# Patient Record
Sex: FEMALE | Race: WHITE | Employment: OTHER | URBAN - NONMETROPOLITAN AREA
[De-identification: names, ages, dates, MRNs, and addresses within clinical notes are randomized per-mention and may not be internally consistent; named-entity substitution may affect disease eponyms.]

---

## 2017-03-17 ENCOUNTER — HOSPITAL ENCOUNTER (OUTPATIENT)
Dept: LAB | Age: 82
Discharge: HOME | End: 2017-03-17
Attending: INTERNAL MEDICINE | Admitting: INTERNAL MEDICINE

## 2017-03-17 LAB
ALBUMIN SERPL-MCNC: 3.9 GM/DL (ref 3.4–5)
ALP BLD-CCNC: 66 IU/L (ref 40–129)
ALT SERPL-CCNC: 5 U/L (ref 10–40)
ANION GAP SERPL CALCULATED.3IONS-SCNC: 3 MMOL/L (ref 4–16)
AST SERPL-CCNC: 13 IU/L (ref 15–37)
BILIRUB SERPL-MCNC: 0.4 MG/DL (ref 0–1)
BUN BLDV-MCNC: 19 MG/DL (ref 6–23)
CALCIUM SERPL-MCNC: 9.4 MG/DL (ref 8.3–10.6)
CHLORIDE BLD-SCNC: 101 MMOL/L (ref 99–110)
CHOLESTEROL: 161 MG/DL
CO2: 33 MMOL/L (ref 21–32)
CREAT SERPL-MCNC: 0.7 MG/DL (ref 0.6–1.1)
CREATININE URINE: 63.4 MG/DL (ref 28–217)
ESTIMATED AVERAGE GLUCOSE: 154 MG/DL
GFR AFRICAN AMERICAN: >60 ML/MIN/1.73M2
GFR NON-AFRICAN AMERICAN: >60 ML/MIN/1.73M2
GLUCOSE BLD-MCNC: 148 MG/DL (ref 70–140)
HBA1C MFR BLD: 7 % (ref 4.2–6.3)
HDLC SERPL-MCNC: 55 MG/DL
LDL CHOLESTEROL DIRECT: 107 MG/DL
MICROALBUMIN/CREAT 24H UR: 2 MG/DL
MICROALBUMIN/CREAT UR-RTO: 31.5 MG/G CREAT (ref 0–30)
POTASSIUM SERPL-SCNC: 3.9 MMOL/L (ref 3.5–5.1)
SODIUM BLD-SCNC: 137 MMOL/L (ref 135–145)
TOTAL PROTEIN: 7.2 GM/DL (ref 6.4–8.2)
TRIGL SERPL-MCNC: 71 MG/DL

## 2017-03-22 ENCOUNTER — OFFICE VISIT (OUTPATIENT)
Dept: INTERNAL MEDICINE CLINIC | Age: 82
End: 2017-03-22

## 2017-03-22 VITALS
WEIGHT: 123 LBS | HEIGHT: 64 IN | TEMPERATURE: 97.7 F | SYSTOLIC BLOOD PRESSURE: 120 MMHG | BODY MASS INDEX: 21 KG/M2 | DIASTOLIC BLOOD PRESSURE: 72 MMHG | HEART RATE: 88 BPM | OXYGEN SATURATION: 96 % | RESPIRATION RATE: 16 BRPM

## 2017-03-22 DIAGNOSIS — I77.1 STENOSIS OF LEFT SUBCLAVIAN ARTERY (HCC): ICD-10-CM

## 2017-03-22 DIAGNOSIS — E78.2 MIXED HYPERLIPIDEMIA: Primary | ICD-10-CM

## 2017-03-22 DIAGNOSIS — I25.10 CORONARY ARTERY DISEASE INVOLVING NATIVE CORONARY ARTERY OF NATIVE HEART WITHOUT ANGINA PECTORIS: ICD-10-CM

## 2017-03-22 DIAGNOSIS — E11.9 DIABETES MELLITUS TYPE 2 IN NONOBESE (HCC): ICD-10-CM

## 2017-03-22 DIAGNOSIS — H40.9 GLAUCOMA OF BOTH EYES, UNSPECIFIED GLAUCOMA: ICD-10-CM

## 2017-03-22 DIAGNOSIS — F41.8 SITUATIONAL ANXIETY: ICD-10-CM

## 2017-03-22 DIAGNOSIS — C50.912 MALIGNANT NEOPLASM OF LEFT FEMALE BREAST, UNSPECIFIED SITE OF BREAST: ICD-10-CM

## 2017-03-22 DIAGNOSIS — N63.20 LEFT BREAST MASS: ICD-10-CM

## 2017-03-22 DIAGNOSIS — G20 PARKINSON DISEASE (HCC): ICD-10-CM

## 2017-03-22 PROCEDURE — 1090F PRES/ABSN URINE INCON ASSESS: CPT | Performed by: INTERNAL MEDICINE

## 2017-03-22 PROCEDURE — G8427 DOCREV CUR MEDS BY ELIG CLIN: HCPCS | Performed by: INTERNAL MEDICINE

## 2017-03-22 PROCEDURE — G8419 CALC BMI OUT NRM PARAM NOF/U: HCPCS | Performed by: INTERNAL MEDICINE

## 2017-03-22 PROCEDURE — 1036F TOBACCO NON-USER: CPT | Performed by: INTERNAL MEDICINE

## 2017-03-22 PROCEDURE — G8598 ASA/ANTIPLAT THER USED: HCPCS | Performed by: INTERNAL MEDICINE

## 2017-03-22 PROCEDURE — 4040F PNEUMOC VAC/ADMIN/RCVD: CPT | Performed by: INTERNAL MEDICINE

## 2017-03-22 PROCEDURE — G8484 FLU IMMUNIZE NO ADMIN: HCPCS | Performed by: INTERNAL MEDICINE

## 2017-03-22 PROCEDURE — G8399 PT W/DXA RESULTS DOCUMENT: HCPCS | Performed by: INTERNAL MEDICINE

## 2017-03-22 PROCEDURE — 1123F ACP DISCUSS/DSCN MKR DOCD: CPT | Performed by: INTERNAL MEDICINE

## 2017-03-22 PROCEDURE — 99214 OFFICE O/P EST MOD 30 MIN: CPT | Performed by: INTERNAL MEDICINE

## 2017-03-22 RX ORDER — SIMVASTATIN 10 MG
10 TABLET ORAL NIGHTLY
Qty: 30 TABLET | Refills: 5 | Status: SHIPPED | OUTPATIENT
Start: 2017-03-22 | End: 2018-07-16 | Stop reason: SDUPTHER

## 2017-03-22 RX ORDER — CLONAZEPAM 0.5 MG/1
0.5 TABLET ORAL DAILY
Refills: 3 | COMMUNITY
Start: 2017-01-03 | End: 2017-07-06 | Stop reason: ALTCHOICE

## 2017-03-22 ASSESSMENT — ENCOUNTER SYMPTOMS
COUGH: 0
SHORTNESS OF BREATH: 0
RESPIRATORY NEGATIVE: 1
GASTROINTESTINAL NEGATIVE: 1
EYES NEGATIVE: 1

## 2017-03-28 ENCOUNTER — OFFICE VISIT (OUTPATIENT)
Dept: SURGERY | Age: 82
End: 2017-03-28

## 2017-03-28 VITALS
WEIGHT: 123.2 LBS | HEART RATE: 86 BPM | BODY MASS INDEX: 21.48 KG/M2 | RESPIRATION RATE: 14 BRPM | DIASTOLIC BLOOD PRESSURE: 86 MMHG | SYSTOLIC BLOOD PRESSURE: 120 MMHG

## 2017-03-28 DIAGNOSIS — N63.20 LEFT BREAST MASS: Primary | ICD-10-CM

## 2017-03-28 PROCEDURE — G8598 ASA/ANTIPLAT THER USED: HCPCS | Performed by: SURGERY

## 2017-03-28 PROCEDURE — G8427 DOCREV CUR MEDS BY ELIG CLIN: HCPCS | Performed by: SURGERY

## 2017-03-28 PROCEDURE — G8419 CALC BMI OUT NRM PARAM NOF/U: HCPCS | Performed by: SURGERY

## 2017-03-28 PROCEDURE — G8484 FLU IMMUNIZE NO ADMIN: HCPCS | Performed by: SURGERY

## 2017-03-28 PROCEDURE — 4040F PNEUMOC VAC/ADMIN/RCVD: CPT | Performed by: SURGERY

## 2017-03-28 PROCEDURE — G8399 PT W/DXA RESULTS DOCUMENT: HCPCS | Performed by: SURGERY

## 2017-03-28 PROCEDURE — 1123F ACP DISCUSS/DSCN MKR DOCD: CPT | Performed by: SURGERY

## 2017-03-28 PROCEDURE — 99213 OFFICE O/P EST LOW 20 MIN: CPT | Performed by: SURGERY

## 2017-03-28 PROCEDURE — 1036F TOBACCO NON-USER: CPT | Performed by: SURGERY

## 2017-03-28 PROCEDURE — 1090F PRES/ABSN URINE INCON ASSESS: CPT | Performed by: SURGERY

## 2017-07-06 ENCOUNTER — OFFICE VISIT (OUTPATIENT)
Dept: INTERNAL MEDICINE CLINIC | Age: 82
End: 2017-07-06

## 2017-07-06 VITALS
RESPIRATION RATE: 16 BRPM | HEART RATE: 88 BPM | DIASTOLIC BLOOD PRESSURE: 68 MMHG | BODY MASS INDEX: 21.52 KG/M2 | WEIGHT: 123.4 LBS | SYSTOLIC BLOOD PRESSURE: 118 MMHG | OXYGEN SATURATION: 96 % | TEMPERATURE: 98.4 F

## 2017-07-06 DIAGNOSIS — H40.9 GLAUCOMA OF BOTH EYES, UNSPECIFIED GLAUCOMA: ICD-10-CM

## 2017-07-06 DIAGNOSIS — I25.10 CORONARY ARTERY DISEASE INVOLVING NATIVE CORONARY ARTERY OF NATIVE HEART WITHOUT ANGINA PECTORIS: ICD-10-CM

## 2017-07-06 DIAGNOSIS — E78.2 MIXED HYPERLIPIDEMIA: ICD-10-CM

## 2017-07-06 DIAGNOSIS — F41.8 SITUATIONAL ANXIETY: ICD-10-CM

## 2017-07-06 DIAGNOSIS — N63.20 LEFT BREAST MASS: ICD-10-CM

## 2017-07-06 DIAGNOSIS — C50.912 MALIGNANT NEOPLASM OF LEFT FEMALE BREAST, UNSPECIFIED SITE OF BREAST: ICD-10-CM

## 2017-07-06 DIAGNOSIS — R05.9 COUGH: ICD-10-CM

## 2017-07-06 DIAGNOSIS — E11.9 DIABETES MELLITUS TYPE 2 IN NONOBESE (HCC): Primary | ICD-10-CM

## 2017-07-06 DIAGNOSIS — G20 PARKINSON DISEASE (HCC): ICD-10-CM

## 2017-07-06 DIAGNOSIS — I77.1 STENOSIS OF LEFT SUBCLAVIAN ARTERY (HCC): ICD-10-CM

## 2017-07-06 LAB — HBA1C MFR BLD: 7.1 %

## 2017-07-06 PROCEDURE — G8598 ASA/ANTIPLAT THER USED: HCPCS | Performed by: INTERNAL MEDICINE

## 2017-07-06 PROCEDURE — 99214 OFFICE O/P EST MOD 30 MIN: CPT | Performed by: INTERNAL MEDICINE

## 2017-07-06 PROCEDURE — G8427 DOCREV CUR MEDS BY ELIG CLIN: HCPCS | Performed by: INTERNAL MEDICINE

## 2017-07-06 PROCEDURE — G8399 PT W/DXA RESULTS DOCUMENT: HCPCS | Performed by: INTERNAL MEDICINE

## 2017-07-06 PROCEDURE — 4040F PNEUMOC VAC/ADMIN/RCVD: CPT | Performed by: INTERNAL MEDICINE

## 2017-07-06 PROCEDURE — G8420 CALC BMI NORM PARAMETERS: HCPCS | Performed by: INTERNAL MEDICINE

## 2017-07-06 PROCEDURE — 1123F ACP DISCUSS/DSCN MKR DOCD: CPT | Performed by: INTERNAL MEDICINE

## 2017-07-06 PROCEDURE — 1090F PRES/ABSN URINE INCON ASSESS: CPT | Performed by: INTERNAL MEDICINE

## 2017-07-06 PROCEDURE — 83036 HEMOGLOBIN GLYCOSYLATED A1C: CPT | Performed by: INTERNAL MEDICINE

## 2017-07-06 PROCEDURE — 1036F TOBACCO NON-USER: CPT | Performed by: INTERNAL MEDICINE

## 2017-07-06 RX ORDER — FLUTICASONE PROPIONATE 50 MCG
1 SPRAY, SUSPENSION (ML) NASAL DAILY
Qty: 1 BOTTLE | Refills: 0 | Status: SHIPPED | OUTPATIENT
Start: 2017-07-06 | End: 2018-07-16 | Stop reason: SDUPTHER

## 2017-07-06 ASSESSMENT — ENCOUNTER SYMPTOMS
WHEEZING: 0
SPUTUM PRODUCTION: 0
GASTROINTESTINAL NEGATIVE: 1
COUGH: 1
SORE THROAT: 0
EYES NEGATIVE: 1
HEMOPTYSIS: 0
SHORTNESS OF BREATH: 0

## 2017-07-18 ENCOUNTER — HOSPITAL ENCOUNTER (OUTPATIENT)
Dept: GENERAL RADIOLOGY | Age: 82
Discharge: HOME | End: 2017-07-18
Attending: INTERNAL MEDICINE | Admitting: INTERNAL MEDICINE

## 2017-07-18 DIAGNOSIS — R05.9 COUGH: ICD-10-CM

## 2017-09-22 ENCOUNTER — HOSPITAL ENCOUNTER (OUTPATIENT)
Dept: LAB | Age: 82
Discharge: HOME | End: 2017-09-22
Attending: INTERNAL MEDICINE | Admitting: INTERNAL MEDICINE

## 2017-09-22 LAB
ALBUMIN SERPL-MCNC: 4.2 GM/DL (ref 3.4–5)
ALP BLD-CCNC: 62 IU/L (ref 40–129)
ALT SERPL-CCNC: 5 U/L (ref 10–40)
ANION GAP SERPL CALCULATED.3IONS-SCNC: 8 MMOL/L (ref 4–16)
AST SERPL-CCNC: 13 IU/L (ref 15–37)
BASOPHILS ABSOLUTE: 0.1 K/CU MM
BASOPHILS RELATIVE PERCENT: 1.1 % (ref 0–1)
BILIRUB SERPL-MCNC: 0.5 MG/DL (ref 0–1)
BUN BLDV-MCNC: 17 MG/DL (ref 6–23)
CALCIUM SERPL-MCNC: 9.2 MG/DL (ref 8.3–10.6)
CHLORIDE BLD-SCNC: 103 MMOL/L (ref 99–110)
CHOLESTEROL, FASTING: 165 MG/DL
CO2: 30 MMOL/L (ref 21–32)
CREAT SERPL-MCNC: 0.7 MG/DL (ref 0.6–1.1)
DIFFERENTIAL TYPE: ABNORMAL
EOSINOPHILS ABSOLUTE: 0.3 K/CU MM
EOSINOPHILS RELATIVE PERCENT: 4.2 % (ref 0–3)
GFR AFRICAN AMERICAN: >60 ML/MIN/1.73M2
GFR NON-AFRICAN AMERICAN: >60 ML/MIN/1.73M2
GLUCOSE FASTING: 162 MG/DL (ref 70–99)
HCT VFR BLD CALC: 40.8 % (ref 37–47)
HDLC SERPL-MCNC: 43 MG/DL
HEMOGLOBIN: 13.4 GM/DL (ref 12.5–16)
IMMATURE NEUTROPHIL %: 0.6 % (ref 0–0.43)
LDL CHOLESTEROL DIRECT: 100 MG/DL
LYMPHOCYTES ABSOLUTE: 1.7 K/CU MM
LYMPHOCYTES RELATIVE PERCENT: 26.1 % (ref 24–44)
MCH RBC QN AUTO: 27.5 PG (ref 27–31)
MCHC RBC AUTO-ENTMCNC: 32.8 % (ref 32–36)
MCV RBC AUTO: 83.6 FL (ref 78–100)
MONOCYTES ABSOLUTE: 0.5 K/CU MM
MONOCYTES RELATIVE PERCENT: 7.7 % (ref 0–4)
PDW BLD-RTO: 13.3 % (ref 11.7–14.9)
PLATELET # BLD: 270 K/CU MM (ref 140–440)
PMV BLD AUTO: 10.4 FL (ref 7.5–11.1)
POTASSIUM SERPL-SCNC: 4.6 MMOL/L (ref 3.5–5.1)
RBC # BLD: 4.88 M/CU MM (ref 4.2–5.4)
SEGMENTED NEUTROPHILS ABSOLUTE COUNT: 3.8 K/CU MM
SEGMENTED NEUTROPHILS RELATIVE PERCENT: 60.3 % (ref 36–66)
SODIUM BLD-SCNC: 141 MMOL/L (ref 135–145)
TOTAL IMMATURE NEUTOROPHIL: 0.04 K/CU MM
TOTAL PROTEIN: 7.2 GM/DL (ref 6.4–8.2)
TRIGLYCERIDE, FASTING: 98 MG/DL
WBC # BLD: 6.4 K/CU MM (ref 4–10.5)

## 2017-10-05 ENCOUNTER — OFFICE VISIT (OUTPATIENT)
Dept: INTERNAL MEDICINE CLINIC | Age: 82
End: 2017-10-05

## 2017-10-05 VITALS
OXYGEN SATURATION: 97 % | TEMPERATURE: 98.9 F | SYSTOLIC BLOOD PRESSURE: 102 MMHG | DIASTOLIC BLOOD PRESSURE: 68 MMHG | WEIGHT: 123.2 LBS | HEART RATE: 84 BPM | BODY MASS INDEX: 22.67 KG/M2 | RESPIRATION RATE: 16 BRPM | HEIGHT: 62 IN

## 2017-10-05 DIAGNOSIS — R05.9 COUGH: ICD-10-CM

## 2017-10-05 DIAGNOSIS — C50.912 MALIGNANT NEOPLASM OF LEFT FEMALE BREAST, UNSPECIFIED SITE OF BREAST: ICD-10-CM

## 2017-10-05 DIAGNOSIS — H40.9 GLAUCOMA OF BOTH EYES, UNSPECIFIED GLAUCOMA: ICD-10-CM

## 2017-10-05 DIAGNOSIS — I77.1 STENOSIS OF LEFT SUBCLAVIAN ARTERY (HCC): ICD-10-CM

## 2017-10-05 DIAGNOSIS — E11.9 DIABETES MELLITUS TYPE 2 IN NONOBESE (HCC): Primary | ICD-10-CM

## 2017-10-05 DIAGNOSIS — I25.10 CORONARY ARTERY DISEASE INVOLVING NATIVE CORONARY ARTERY OF NATIVE HEART WITHOUT ANGINA PECTORIS: ICD-10-CM

## 2017-10-05 DIAGNOSIS — E78.2 MIXED HYPERLIPIDEMIA: ICD-10-CM

## 2017-10-05 DIAGNOSIS — N63.20 LEFT BREAST MASS: ICD-10-CM

## 2017-10-05 DIAGNOSIS — G20 PARKINSON DISEASE (HCC): ICD-10-CM

## 2017-10-05 LAB — HBA1C MFR BLD: 6.8 %

## 2017-10-05 PROCEDURE — 1090F PRES/ABSN URINE INCON ASSESS: CPT | Performed by: INTERNAL MEDICINE

## 2017-10-05 PROCEDURE — 1123F ACP DISCUSS/DSCN MKR DOCD: CPT | Performed by: INTERNAL MEDICINE

## 2017-10-05 PROCEDURE — G8399 PT W/DXA RESULTS DOCUMENT: HCPCS | Performed by: INTERNAL MEDICINE

## 2017-10-05 PROCEDURE — 1036F TOBACCO NON-USER: CPT | Performed by: INTERNAL MEDICINE

## 2017-10-05 PROCEDURE — 99213 OFFICE O/P EST LOW 20 MIN: CPT | Performed by: INTERNAL MEDICINE

## 2017-10-05 PROCEDURE — G8598 ASA/ANTIPLAT THER USED: HCPCS | Performed by: INTERNAL MEDICINE

## 2017-10-05 PROCEDURE — 4040F PNEUMOC VAC/ADMIN/RCVD: CPT | Performed by: INTERNAL MEDICINE

## 2017-10-05 PROCEDURE — 83036 HEMOGLOBIN GLYCOSYLATED A1C: CPT | Performed by: INTERNAL MEDICINE

## 2017-10-05 PROCEDURE — G8420 CALC BMI NORM PARAMETERS: HCPCS | Performed by: INTERNAL MEDICINE

## 2017-10-05 PROCEDURE — G8510 SCR DEP NEG, NO PLAN REQD: HCPCS | Performed by: INTERNAL MEDICINE

## 2017-10-05 PROCEDURE — 3288F FALL RISK ASSESSMENT DOCD: CPT | Performed by: INTERNAL MEDICINE

## 2017-10-05 PROCEDURE — G8427 DOCREV CUR MEDS BY ELIG CLIN: HCPCS | Performed by: INTERNAL MEDICINE

## 2017-10-05 PROCEDURE — G8484 FLU IMMUNIZE NO ADMIN: HCPCS | Performed by: INTERNAL MEDICINE

## 2017-10-05 RX ORDER — METFORMIN HYDROCHLORIDE 500 MG/1
500 TABLET, EXTENDED RELEASE ORAL 2 TIMES DAILY WITH MEALS
Qty: 60 TABLET | Refills: 3 | Status: SHIPPED | OUTPATIENT
Start: 2017-10-05 | End: 2018-07-16 | Stop reason: SDUPTHER

## 2017-10-05 ASSESSMENT — PATIENT HEALTH QUESTIONNAIRE - PHQ9
2. FEELING DOWN, DEPRESSED OR HOPELESS: 1
SUM OF ALL RESPONSES TO PHQ9 QUESTIONS 1 & 2: 2
1. LITTLE INTEREST OR PLEASURE IN DOING THINGS: 1
SUM OF ALL RESPONSES TO PHQ QUESTIONS 1-9: 2

## 2017-10-05 ASSESSMENT — ENCOUNTER SYMPTOMS
NAUSEA: 0
CONSTIPATION: 0
SHORTNESS OF BREATH: 0
GASTROINTESTINAL NEGATIVE: 1
VOMITING: 0
DIARRHEA: 0
RESPIRATORY NEGATIVE: 1
EYES NEGATIVE: 1
HEARTBURN: 0
COUGH: 0
ABDOMINAL PAIN: 0

## 2017-10-05 NOTE — MR AVS SNAPSHOT
After Visit Summary             Jaz Bosch   10/5/2017 9:30 AM   Office Visit    Description:  Female : 1932   Provider:  Bridget Reilly MD   Department:  [facility]              Your Follow-Up and Future Appointments         Below is a list of your follow-up and future appointments. This may not be a complete list as you may have made appointments directly with providers that we are not aware of or your providers may have made some for you. Please call your providers to confirm appointments. It is important to keep your appointments. Please bring your current insurance card, photo ID, co-pay, and all medication bottles to your appointment. If self-pay, payment is expected at the time of service. Your To-Do List     Future Appointments Provider Department Dept Phone    2018 9:15 AM Bridget Reilly MD [facility] [phone]    Please arrive 15 minutes prior to appointment, bring photo ID and insurance card. Follow-Up    Return in about 3 months (around 2018) for Diabetes mellitus, hypertension follow up, cholesterol follow up. Information from Your Visit        Department     Name Address Phone Fax    [place] [address] [phone]      You Were Seen for:         Comments    Diabetes mellitus type 2 in nonobese [facility])   [[id number]]         Vital Signs     Blood Pressure Pulse Temperature Respirations Height Weight    102/68 84 98.9 °F (37.2 °C) (Oral) 16 5' 2\" (1.575 m) 123 lb 3.2 oz (55.9 kg)    Oxygen Saturation Body Mass Index Smoking Status             97% 22.53 kg/m2 Never Smoker            Today's Medication Changes          These changes are accurate as of: 10/5/17 10:31 AM.  If you have any questions, ask your nurse or doctor.                START taking these medications           metFORMIN 500 MG extended release tablet   Commonly known as:  Valeria Barrios Instructions: Take 1 tablet by mouth 2 times daily (with meals)   Quantity:  60 tablet   Refills:  3   Replaces:  metFORMIN 500 MG tablet   Started by:  Cecy Bradley MD         STOP taking these medications           metFORMIN 500 MG tablet   Commonly known as:  GLUCOPHAGE   Replaced by:  metFORMIN 500 MG extended release tablet   Stopped by:  Cecy Bradley MD            Where to Get Your Medications      These medications were sent to [place] [address] [address] [facility] [phone] [facility] [phone]  [address] [facility] [address], [address] [place] [address]     Phone:  [phone]     metFORMIN 500 MG extended release tablet               Your Current Medications Are              metFORMIN (GLUCOPHAGE-XR) 500 MG extended release tablet Take 1 tablet by mouth 2 times daily (with meals)    Glucose Blood (ALMA ROSA BREEZE 2 TEST) DISK Patient testing once daily and prn, DX=E11.9    fluticasone (FLONASE) 50 MCG/ACT nasal spray 1 spray by Nasal route daily    simvastatin (ZOCOR) 10 MG tablet Take 1 tablet by mouth nightly    dorzolamide-timolol (COSOPT) 22.3-6.8 MG/ML ophthalmic solution Place 1 drop into both eyes 2 times daily    carbidopa-levodopa (SINEMET)  MG per tablet Take 1 tablet by mouth 5 times daily    glipiZIDE (GLUCOTROL) 5 MG tablet Take 1 tablet by mouth daily    aspirin 81 MG tablet Take 81 mg by mouth daily.       Allergies              Eggs Or Egg-derived Products [Eggs Or Egg-derived Products] Hives    Iodine     Iodine contrast      We Ordered/Performed the following           POCT glycosylated hemoglobin (Hb A1C)          Result Summary for POCT glycosylated hemoglobin (Hb A1C)      Result Information       Status          Abnormal Final result (Collected: 10/5/2017  9:46 AM)           10/5/2017  9:46 AM      Component Results     Component Value Ref Range & Units Status    Hemoglobin A1C 6.8 % Final               Additional Information        Basic Information

## 2017-10-05 NOTE — ASSESSMENT & PLAN NOTE
Hyperlipidemia is stable. Follow low cholesterol diet. Continue current treatment. Pt is on simvastatin. Lipid profile is good.

## 2017-10-26 LAB
DIABETIC RETINOPATHY: NORMAL
DIABETIC RETINOPATHY: NORMAL

## 2018-01-24 ENCOUNTER — OFFICE VISIT (OUTPATIENT)
Dept: INTERNAL MEDICINE CLINIC | Age: 83
End: 2018-01-24

## 2018-01-24 VITALS
HEART RATE: 86 BPM | RESPIRATION RATE: 16 BRPM | OXYGEN SATURATION: 98 % | SYSTOLIC BLOOD PRESSURE: 112 MMHG | BODY MASS INDEX: 22.35 KG/M2 | WEIGHT: 122.2 LBS | DIASTOLIC BLOOD PRESSURE: 70 MMHG | TEMPERATURE: 97.7 F

## 2018-01-24 DIAGNOSIS — M25.552 LEFT HIP PAIN: Primary | ICD-10-CM

## 2018-01-24 DIAGNOSIS — E11.9 DIABETES MELLITUS TYPE 2 IN NONOBESE (HCC): ICD-10-CM

## 2018-01-24 DIAGNOSIS — I25.10 CORONARY ARTERY DISEASE INVOLVING NATIVE CORONARY ARTERY OF NATIVE HEART WITHOUT ANGINA PECTORIS: ICD-10-CM

## 2018-01-24 DIAGNOSIS — I77.1 STENOSIS OF LEFT SUBCLAVIAN ARTERY (HCC): ICD-10-CM

## 2018-01-24 DIAGNOSIS — F41.8 SITUATIONAL ANXIETY: ICD-10-CM

## 2018-01-24 DIAGNOSIS — E78.2 MIXED HYPERLIPIDEMIA: ICD-10-CM

## 2018-01-24 DIAGNOSIS — C50.912 MALIGNANT NEOPLASM OF LEFT FEMALE BREAST, UNSPECIFIED ESTROGEN RECEPTOR STATUS, UNSPECIFIED SITE OF BREAST (HCC): ICD-10-CM
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
## 2018-01-24 DIAGNOSIS — H40.9 GLAUCOMA OF BOTH EYES, UNSPECIFIED GLAUCOMA TYPE: ICD-10-CM

## 2018-01-24 DIAGNOSIS — G20 PARKINSON DISEASE (HCC): ICD-10-CM

## 2018-01-24 PROBLEM — R05.9 COUGH: Status: RESOLVED | Noted: 2017-07-06 | Resolved: 2018-01-24

## 2018-01-24 PROCEDURE — G8420 CALC BMI NORM PARAMETERS: HCPCS | Performed by: INTERNAL MEDICINE

## 2018-01-24 PROCEDURE — G8598 ASA/ANTIPLAT THER USED: HCPCS | Performed by: INTERNAL MEDICINE

## 2018-01-24 PROCEDURE — 99214 OFFICE O/P EST MOD 30 MIN: CPT | Performed by: INTERNAL MEDICINE

## 2018-01-24 PROCEDURE — G8484 FLU IMMUNIZE NO ADMIN: HCPCS | Performed by: INTERNAL MEDICINE

## 2018-01-24 PROCEDURE — 4040F PNEUMOC VAC/ADMIN/RCVD: CPT | Performed by: INTERNAL MEDICINE

## 2018-01-24 PROCEDURE — 1090F PRES/ABSN URINE INCON ASSESS: CPT | Performed by: INTERNAL MEDICINE

## 2018-01-24 PROCEDURE — 1036F TOBACCO NON-USER: CPT | Performed by: INTERNAL MEDICINE

## 2018-01-24 PROCEDURE — 1123F ACP DISCUSS/DSCN MKR DOCD: CPT | Performed by: INTERNAL MEDICINE

## 2018-01-24 PROCEDURE — G8399 PT W/DXA RESULTS DOCUMENT: HCPCS | Performed by: INTERNAL MEDICINE

## 2018-01-24 PROCEDURE — G8427 DOCREV CUR MEDS BY ELIG CLIN: HCPCS | Performed by: INTERNAL MEDICINE

## 2018-01-24 ASSESSMENT — ENCOUNTER SYMPTOMS
BACK PAIN: 1
COUGH: 0
EYES NEGATIVE: 1
SHORTNESS OF BREATH: 0
RESPIRATORY NEGATIVE: 1
GASTROINTESTINAL NEGATIVE: 1

## 2018-01-24 NOTE — ASSESSMENT & PLAN NOTE
Hyperlipidemia is stable. Follow low cholesterol diet. Continue current treatment. Continue simvastatin. Last lipid in 9/2017 were good.

## 2018-01-24 NOTE — PATIENT INSTRUCTIONS
fluids, talk with your doctor before you increase the amount of fluids you drink. · Exercise regularly to improve your strength, muscle tone, and balance. Walk if you can. Swimming may be a good choice if you cannot walk easily. · Have your vision and hearing checked each year or any time you notice a change. If you have trouble seeing and hearing, you might not be able to avoid objects and could lose your balance. · Know the side effects of the medicines you take. Ask your doctor or pharmacist whether the medicines you take can affect your balance. Sleeping pills or sedatives can affect your balance. · Limit the amount of alcohol you drink. Alcohol can impair your balance and other senses. · Ask your doctor whether calluses or corns on your feet need to be removed. If you wear loose-fitting shoes because of calluses or corns, you can lose your balance and fall. · Talk to your doctor if you have numbness in your feet. Preventing falls at home  · Remove raised doorway thresholds, throw rugs, and clutter. Repair loose carpet or raised areas in the floor. · Move furniture and electrical cords to keep them out of walking paths. · Use nonskid floor wax, and wipe up spills right away, especially on ceramic tile floors. · If you use a walker or cane, put rubber tips on it. If you use crutches, clean the bottoms of them regularly with an abrasive pad, such as steel wool. · Keep your house well lit, especially [place], and outside walkways. Use night-lights in areas such as hallways and bathrooms. Add extra light switches or use remote switches (such as switches that go on or off when you clap your hands) to make it easier to turn lights on if you have to get up during the night. · Install sturdy handrails on stairways. · Move items in your cabinets so that the things you use a lot are on the lower shelves (about waist level). · Keep a cordless phone and a flashlight with new batteries by your bed.

## 2018-01-24 NOTE — ASSESSMENT & PLAN NOTE
Pt is tolerating metformin ER and glipizide now  Patient is stable. Continue current treatment. Follow diet.

## 2018-01-24 NOTE — PROGRESS NOTES
jugular venous distention noted. No masses felt,  CARDIOVASCULAR:  Normal S1 and S2    PULMONARY:  No respiratory distress. No wheezes or rales. ABDOMEN:  Soft and non-tender,no masses  or organomegaly. EXTREMITIES:  No cyanosis, clubbing, or significant edema. Left buttock tenderness. No tenderness laterally. Able to walk. Advised to use cane. SKIN: Skin is warm and dry. NEUROLOGICAL:  Cranial nerves II through XII are grossly intact. IMPRESSION:    Encounter Diagnoses   Name Primary?  Left hip pain Yes    Diabetes mellitus type 2 in nonobese [facility])     Coronary artery disease suspected. refused cardio     Mixed hyperlipidemia     Stenosis of left subclavian artery     Parkinson disease ([facility] [address]     Glaucoma of both eyes, unspecified glaucoma type     Situational anxiety     Fall, initial encounter     Malignant neoplasm of left female breast, unspecified estrogen receptor status, unspecified site of breast ([facility] [address]        ASSESSMENT/PLAN:    Left hip pain  Left hip pain posteriorly  Will get x ray of left hip  Use cane. If get worse call  I had extra cane and gave to her to use it. Diabetes mellitus type 2 in nonobese (HCC)  Pt is tolerating metformin ER and glipizide now  Patient is stable. Continue current treatment. Follow diet. Coronary artery disease suspected. refused cardio  Suspected. Refused to see cardio. No angina. Mixed hyperlipidemia  Hyperlipidemia is stable. Follow low cholesterol diet. Continue current treatment. Continue simvastatin. Last lipid in 9/2017 were good. Stenosis of left subclavian artery  Pulse weaker on left radial  Carotid and subclavian bruit on left side. refused w/u  Discussed again still refused    Parkinson disease [facility])  She is on Carbidopa-levadopa   primidone was d/c by Dr Adriana Regalado  Pt has tremors. Doing well.     Malignant neoplasm of left breast (HCC)  Left breast, Radiation therapy, lumpectomy 1985  Does not see any surgeon in f/u  Gets

## 2018-01-31 ENCOUNTER — HOSPITAL ENCOUNTER (OUTPATIENT)
Dept: MAMMOGRAPHY | Age: 83
Discharge: HOME | End: 2018-01-31
Attending: INTERNAL MEDICINE | Admitting: INTERNAL MEDICINE

## 2018-01-31 DIAGNOSIS — C50.912 MALIGNANT NEOPLASM OF LEFT FEMALE BREAST, UNSPECIFIED ESTROGEN RECEPTOR STATUS, UNSPECIFIED SITE OF BREAST (HCC): ICD-10-CM

## 2018-02-01 ENCOUNTER — HOSPITAL ENCOUNTER (OUTPATIENT)
Dept: LAB | Age: 83
Discharge: HOME | End: 2018-02-01
Attending: INTERNAL MEDICINE | Admitting: INTERNAL MEDICINE
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
## 2018-02-01 DIAGNOSIS — M25.552 LEFT HIP PAIN: ICD-10-CM

## 2018-03-12 ENCOUNTER — HOSPITAL ENCOUNTER (OUTPATIENT)
Dept: LAB | Age: 83
Discharge: HOME | End: 2018-03-12
Attending: INTERNAL MEDICINE | Admitting: INTERNAL MEDICINE

## 2018-03-12 LAB
ALBUMIN SERPL-MCNC: 4.4 GM/DL (ref 3.4–5)
ALP BLD-CCNC: 71 IU/L (ref 40–129)
ALT SERPL-CCNC: 10 U/L (ref 10–40)
ANION GAP SERPL CALCULATED.3IONS-SCNC: 11 MMOL/L (ref 4–16)
AST SERPL-CCNC: 12 IU/L (ref 15–37)
BILIRUB SERPL-MCNC: 0.5 MG/DL (ref 0–1)
BUN BLDV-MCNC: 20 MG/DL (ref 6–23)
CALCIUM SERPL-MCNC: 9.6 MG/DL (ref 8.3–10.6)
CHLORIDE BLD-SCNC: 102 MMOL/L (ref 99–110)
CHOLESTEROL, FASTING: 164 MG/DL
CO2: 30 MMOL/L (ref 21–32)
CREAT SERPL-MCNC: 0.8 MG/DL (ref 0.6–1.1)
GFR AFRICAN AMERICAN: >60 ML/MIN/1.73M2
GFR NON-AFRICAN AMERICAN: >60 ML/MIN/1.73M2
GLUCOSE FASTING: 169 MG/DL (ref 70–99)
HDLC SERPL-MCNC: 55 MG/DL
LDL CHOLESTEROL DIRECT: 105 MG/DL
POTASSIUM SERPL-SCNC: 4.2 MMOL/L (ref 3.5–5.1)
SODIUM BLD-SCNC: 143 MMOL/L (ref 135–145)
TOTAL PROTEIN: 7.6 GM/DL (ref 6.4–8.2)
TRIGLYCERIDE, FASTING: 87 MG/DL

## 2018-04-24 ENCOUNTER — OFFICE VISIT (OUTPATIENT)
Dept: INTERNAL MEDICINE CLINIC | Age: 83
End: 2018-04-24

## 2018-04-24 VITALS
DIASTOLIC BLOOD PRESSURE: 78 MMHG | SYSTOLIC BLOOD PRESSURE: 122 MMHG | WEIGHT: 123.6 LBS | BODY MASS INDEX: 22.61 KG/M2 | TEMPERATURE: 98.8 F | RESPIRATION RATE: 16 BRPM | HEART RATE: 72 BPM | OXYGEN SATURATION: 98 %

## 2018-04-24 DIAGNOSIS — H40.9 GLAUCOMA OF BOTH EYES, UNSPECIFIED GLAUCOMA TYPE: ICD-10-CM

## 2018-04-24 DIAGNOSIS — M25.552 LEFT HIP PAIN: ICD-10-CM

## 2018-04-24 DIAGNOSIS — E11.9 DIABETES MELLITUS TYPE 2 IN NONOBESE (HCC): Primary | ICD-10-CM

## 2018-04-24 DIAGNOSIS — E78.2 MIXED HYPERLIPIDEMIA: ICD-10-CM

## 2018-04-24 DIAGNOSIS — C50.912 MALIGNANT NEOPLASM OF LEFT FEMALE BREAST, UNSPECIFIED ESTROGEN RECEPTOR STATUS, UNSPECIFIED SITE OF BREAST (HCC): ICD-10-CM

## 2018-04-24 DIAGNOSIS — I77.1 STENOSIS OF LEFT SUBCLAVIAN ARTERY (HCC): ICD-10-CM

## 2018-04-24 DIAGNOSIS — G20 PARKINSON DISEASE (HCC): ICD-10-CM

## 2018-04-24 LAB — HBA1C MFR BLD: 6.6 %

## 2018-04-24 PROCEDURE — 4040F PNEUMOC VAC/ADMIN/RCVD: CPT | Performed by: INTERNAL MEDICINE

## 2018-04-24 PROCEDURE — 1090F PRES/ABSN URINE INCON ASSESS: CPT | Performed by: INTERNAL MEDICINE

## 2018-04-24 PROCEDURE — G8420 CALC BMI NORM PARAMETERS: HCPCS | Performed by: INTERNAL MEDICINE

## 2018-04-24 PROCEDURE — 1036F TOBACCO NON-USER: CPT | Performed by: INTERNAL MEDICINE

## 2018-04-24 PROCEDURE — 83036 HEMOGLOBIN GLYCOSYLATED A1C: CPT | Performed by: INTERNAL MEDICINE

## 2018-04-24 PROCEDURE — G8427 DOCREV CUR MEDS BY ELIG CLIN: HCPCS | Performed by: INTERNAL MEDICINE

## 2018-04-24 PROCEDURE — G8399 PT W/DXA RESULTS DOCUMENT: HCPCS | Performed by: INTERNAL MEDICINE

## 2018-04-24 PROCEDURE — G8598 ASA/ANTIPLAT THER USED: HCPCS | Performed by: INTERNAL MEDICINE

## 2018-04-24 PROCEDURE — 99213 OFFICE O/P EST LOW 20 MIN: CPT | Performed by: INTERNAL MEDICINE

## 2018-04-24 PROCEDURE — 1123F ACP DISCUSS/DSCN MKR DOCD: CPT | Performed by: INTERNAL MEDICINE

## 2018-07-16 ENCOUNTER — OFFICE VISIT (OUTPATIENT)
Dept: INTERNAL MEDICINE CLINIC | Age: 83
End: 2018-07-16

## 2018-07-16 VITALS
RESPIRATION RATE: 16 BRPM | TEMPERATURE: 97.8 F | SYSTOLIC BLOOD PRESSURE: 122 MMHG | HEART RATE: 84 BPM | WEIGHT: 117.2 LBS | OXYGEN SATURATION: 98 % | BODY MASS INDEX: 21.57 KG/M2 | DIASTOLIC BLOOD PRESSURE: 64 MMHG | HEIGHT: 62 IN

## 2018-07-16 DIAGNOSIS — F41.8 SITUATIONAL ANXIETY: ICD-10-CM

## 2018-07-16 DIAGNOSIS — C50.912 MALIGNANT NEOPLASM OF LEFT FEMALE BREAST, UNSPECIFIED ESTROGEN RECEPTOR STATUS, UNSPECIFIED SITE OF BREAST (HCC): ICD-10-CM

## 2018-07-16 DIAGNOSIS — G20 PARKINSON DISEASE (HCC): ICD-10-CM

## 2018-07-16 DIAGNOSIS — I77.1 STENOSIS OF LEFT SUBCLAVIAN ARTERY (HCC): ICD-10-CM

## 2018-07-16 DIAGNOSIS — I25.10 CORONARY ARTERY DISEASE INVOLVING NATIVE CORONARY ARTERY OF NATIVE HEART WITHOUT ANGINA PECTORIS: ICD-10-CM

## 2018-07-16 DIAGNOSIS — E78.2 MIXED HYPERLIPIDEMIA: ICD-10-CM

## 2018-07-16 DIAGNOSIS — E11.9 DIABETES MELLITUS TYPE 2 IN NONOBESE (HCC): Primary | ICD-10-CM

## 2018-07-16 DIAGNOSIS — H40.9 GLAUCOMA OF BOTH EYES, UNSPECIFIED GLAUCOMA TYPE: ICD-10-CM

## 2018-07-16 PROBLEM — M25.552 LEFT HIP PAIN: Status: RESOLVED | Noted: 2018-01-24 | Resolved: 2018-07-16

## 2018-07-16 PROBLEM — N63.20 LEFT BREAST MASS: Status: RESOLVED | Noted: 2017-03-22 | Resolved: 2018-07-16

## 2018-07-16 PROCEDURE — 1101F PT FALLS ASSESS-DOCD LE1/YR: CPT | Performed by: INTERNAL MEDICINE

## 2018-07-16 PROCEDURE — G8598 ASA/ANTIPLAT THER USED: HCPCS | Performed by: INTERNAL MEDICINE

## 2018-07-16 PROCEDURE — 1090F PRES/ABSN URINE INCON ASSESS: CPT | Performed by: INTERNAL MEDICINE

## 2018-07-16 PROCEDURE — 4040F PNEUMOC VAC/ADMIN/RCVD: CPT | Performed by: INTERNAL MEDICINE

## 2018-07-16 PROCEDURE — G8420 CALC BMI NORM PARAMETERS: HCPCS | Performed by: INTERNAL MEDICINE

## 2018-07-16 PROCEDURE — G8427 DOCREV CUR MEDS BY ELIG CLIN: HCPCS | Performed by: INTERNAL MEDICINE

## 2018-07-16 PROCEDURE — G8399 PT W/DXA RESULTS DOCUMENT: HCPCS | Performed by: INTERNAL MEDICINE

## 2018-07-16 PROCEDURE — 1123F ACP DISCUSS/DSCN MKR DOCD: CPT | Performed by: INTERNAL MEDICINE

## 2018-07-16 PROCEDURE — 99214 OFFICE O/P EST MOD 30 MIN: CPT | Performed by: INTERNAL MEDICINE

## 2018-07-16 PROCEDURE — 1036F TOBACCO NON-USER: CPT | Performed by: INTERNAL MEDICINE

## 2018-07-16 RX ORDER — GLIPIZIDE 5 MG/1
5 TABLET ORAL DAILY
Qty: 30 TABLET | Refills: 3 | Status: CANCELLED | OUTPATIENT
Start: 2018-07-16

## 2018-07-16 RX ORDER — SIMVASTATIN 10 MG
10 TABLET ORAL NIGHTLY
Qty: 30 TABLET | Refills: 5 | Status: SHIPPED | OUTPATIENT
Start: 2018-07-16 | End: 2018-11-07 | Stop reason: SDUPTHER

## 2018-07-16 RX ORDER — FLUTICASONE PROPIONATE 50 MCG
1 SPRAY, SUSPENSION (ML) NASAL DAILY
Qty: 1 BOTTLE | Refills: 0 | Status: SHIPPED | OUTPATIENT
Start: 2018-07-16 | End: 2019-11-06 | Stop reason: SDUPTHER

## 2018-07-16 RX ORDER — CLONAZEPAM 0.5 MG/1
0.5 TABLET ORAL 2 TIMES DAILY PRN
COMMUNITY
End: 2018-07-16 | Stop reason: ALTCHOICE

## 2018-07-16 RX ORDER — METFORMIN HYDROCHLORIDE 500 MG/1
500 TABLET, EXTENDED RELEASE ORAL 2 TIMES DAILY WITH MEALS
Qty: 60 TABLET | Refills: 3 | Status: SHIPPED | OUTPATIENT
Start: 2018-07-16 | End: 2018-11-07 | Stop reason: SDUPTHER

## 2018-07-16 ASSESSMENT — ENCOUNTER SYMPTOMS
HEMOPTYSIS: 0
SORE THROAT: 0
EYE REDNESS: 0
HEARTBURN: 0
NAUSEA: 0
SHORTNESS OF BREATH: 0
BLURRED VISION: 0
GASTROINTESTINAL NEGATIVE: 1
ABDOMINAL PAIN: 0
COUGH: 0
SINUS PAIN: 0
VOMITING: 0
DOUBLE VISION: 0
PHOTOPHOBIA: 0
RESPIRATORY NEGATIVE: 1

## 2018-07-16 NOTE — PROGRESS NOTES
Buster Corley  Patient's  is 1932  Seen in office on 2018      SUBJECTIVE:  Rebecca Anthony is a 80 y. o.year old female presents today   Chief Complaint   Patient presents with    3 Month Follow-Up     DM, Cholesterol     Other     New DM test system- Unknown how to use     Patient is here for follow-up of diabetes, hyperlipidemia and Parkinson's disease. Doing well. Blood sugars are running well. She got a new glucometer and is having difficulty using it. Patient denies any hypoglycemia. No blurred vision or double vision. No polyuria. No tingling or numbness of the feet. Patient has hyperlipidemia and is on medication. No myalgias. Patient denies any chest pain. No shortness of breath. No cough or sputum production. No nausea, vomiting or diarrhea. Patient is a Parkinson's disease and Sinemet is helping. Patient states Dr. Latonia Ricks has reduced the dose of Sinemet to only 3 times a day per  Taking medications regularly. No side effects noted. Review of Systems   Constitutional: Negative. Negative for chills and fever. HENT: Negative for ear pain, hearing loss, sinus pain, sore throat and tinnitus. Eyes: Negative for blurred vision, double vision, photophobia and redness. Respiratory: Negative. Negative for cough, hemoptysis and shortness of breath. Cardiovascular: Negative for chest pain, palpitations and leg swelling. Gastrointestinal: Negative. Negative for abdominal pain, heartburn, nausea and vomiting. Genitourinary: Negative. Musculoskeletal: Negative. Negative for falls and neck pain. Skin: Negative. Negative for itching and rash. Neurological: Positive for tremors. Negative for dizziness, tingling, sensory change, speech change, focal weakness, seizures, loss of consciousness and headaches. Endo/Heme/Allergies: Negative. Negative for environmental allergies and polydipsia. Does not bruise/bleed easily.    Psychiatric/Behavioral: Negative for depression,

## 2018-07-17 NOTE — ASSESSMENT & PLAN NOTE
Pulse weaker on left radial  Carotid and subclavian bruit on left side.  refused w/u  Discussed again still refused

## 2018-09-25 LAB — DIABETIC RETINOPATHY: POSITIVE

## 2018-10-12 ENCOUNTER — HOSPITAL ENCOUNTER (OUTPATIENT)
Age: 83
Discharge: HOME | End: 2018-10-12
Payer: MEDICARE

## 2018-10-12 DIAGNOSIS — E11.9 DIABETES MELLITUS TYPE 2 IN NONOBESE (HCC): ICD-10-CM

## 2018-10-12 DIAGNOSIS — E78.2 MIXED HYPERLIPIDEMIA: ICD-10-CM

## 2018-10-12 LAB
ALBUMIN SERPL-MCNC: 4.3 GM/DL (ref 3.4–5)
ALP BLD-CCNC: 70 IU/L (ref 40–129)
ALT SERPL-CCNC: 11 U/L (ref 10–40)
ANION GAP SERPL CALCULATED.3IONS-SCNC: 7 MMOL/L (ref 4–16)
AST SERPL-CCNC: 13 IU/L (ref 15–37)
BASOPHILS ABSOLUTE: 0.1 K/CU MM
BASOPHILS RELATIVE PERCENT: 0.9 % (ref 0–1)
BILIRUB SERPL-MCNC: 0.8 MG/DL (ref 0–1)
BUN BLDV-MCNC: 20 MG/DL (ref 6–23)
CALCIUM SERPL-MCNC: 9.2 MG/DL (ref 8.3–10.6)
CHLORIDE BLD-SCNC: 104 MMOL/L (ref 99–110)
CHOLESTEROL, FASTING: 177 MG/DL
CO2: 31 MMOL/L (ref 21–32)
CREAT SERPL-MCNC: 0.7 MG/DL (ref 0.6–1.1)
DIFFERENTIAL TYPE: ABNORMAL
EOSINOPHILS ABSOLUTE: 0.3 K/CU MM
EOSINOPHILS RELATIVE PERCENT: 3.8 % (ref 0–3)
GFR AFRICAN AMERICAN: >60 ML/MIN/1.73M2
GFR NON-AFRICAN AMERICAN: >60 ML/MIN/1.73M2
GLUCOSE BLD-MCNC: 139 MG/DL (ref 70–99)
HCT VFR BLD CALC: 44 % (ref 37–47)
HDLC SERPL-MCNC: 53 MG/DL
HEMOGLOBIN: 13.6 GM/DL (ref 12.5–16)
IMMATURE NEUTROPHIL %: 0.3 % (ref 0–0.43)
LDL CHOLESTEROL DIRECT: 117 MG/DL
LYMPHOCYTES ABSOLUTE: 1.6 K/CU MM
LYMPHOCYTES RELATIVE PERCENT: 23.6 % (ref 24–44)
MCH RBC QN AUTO: 26.6 PG (ref 27–31)
MCHC RBC AUTO-ENTMCNC: 30.9 % (ref 32–36)
MCV RBC AUTO: 86.1 FL (ref 78–100)
MONOCYTES ABSOLUTE: 0.5 K/CU MM
MONOCYTES RELATIVE PERCENT: 7.6 % (ref 0–4)
PDW BLD-RTO: 13.4 % (ref 11.7–14.9)
PLATELET # BLD: 291 K/CU MM (ref 140–440)
PMV BLD AUTO: 10.5 FL (ref 7.5–11.1)
POTASSIUM SERPL-SCNC: 4.2 MMOL/L (ref 3.5–5.1)
RBC # BLD: 5.11 M/CU MM (ref 4.2–5.4)
SEGMENTED NEUTROPHILS ABSOLUTE COUNT: 4.4 K/CU MM
SEGMENTED NEUTROPHILS RELATIVE PERCENT: 63.8 % (ref 36–66)
SODIUM BLD-SCNC: 142 MMOL/L (ref 135–145)
TOTAL IMMATURE NEUTOROPHIL: 0.02 K/CU MM
TOTAL PROTEIN: 7.5 GM/DL (ref 6.4–8.2)
TRIGLYCERIDE, FASTING: 75 MG/DL
WBC # BLD: 6.8 K/CU MM (ref 4–10.5)

## 2018-10-12 PROCEDURE — 80061 LIPID PANEL: CPT

## 2018-10-12 PROCEDURE — 80053 COMPREHEN METABOLIC PANEL: CPT

## 2018-10-12 PROCEDURE — 36415 COLL VENOUS BLD VENIPUNCTURE: CPT

## 2018-10-12 PROCEDURE — 85025 COMPLETE CBC W/AUTO DIFF WBC: CPT

## 2018-11-07 ENCOUNTER — OFFICE VISIT (OUTPATIENT)
Dept: INTERNAL MEDICINE CLINIC | Age: 83
End: 2018-11-07
Payer: MEDICARE

## 2018-11-07 VITALS
RESPIRATION RATE: 16 BRPM | HEART RATE: 86 BPM | SYSTOLIC BLOOD PRESSURE: 136 MMHG | DIASTOLIC BLOOD PRESSURE: 70 MMHG | TEMPERATURE: 97.8 F | OXYGEN SATURATION: 98 % | WEIGHT: 121.6 LBS | BODY MASS INDEX: 22.24 KG/M2

## 2018-11-07 DIAGNOSIS — G20 PARKINSON DISEASE (HCC): ICD-10-CM

## 2018-11-07 DIAGNOSIS — E78.2 MIXED HYPERLIPIDEMIA: ICD-10-CM

## 2018-11-07 DIAGNOSIS — I25.10 CORONARY ARTERY DISEASE INVOLVING NATIVE CORONARY ARTERY OF NATIVE HEART WITHOUT ANGINA PECTORIS: ICD-10-CM

## 2018-11-07 DIAGNOSIS — H40.9 GLAUCOMA OF BOTH EYES, UNSPECIFIED GLAUCOMA TYPE: ICD-10-CM

## 2018-11-07 DIAGNOSIS — C50.912 MALIGNANT NEOPLASM OF LEFT FEMALE BREAST, UNSPECIFIED ESTROGEN RECEPTOR STATUS, UNSPECIFIED SITE OF BREAST (HCC): ICD-10-CM

## 2018-11-07 DIAGNOSIS — E11.9 DIABETES MELLITUS TYPE 2 IN NONOBESE (HCC): Primary | ICD-10-CM

## 2018-11-07 DIAGNOSIS — F41.8 SITUATIONAL ANXIETY: ICD-10-CM

## 2018-11-07 DIAGNOSIS — I77.1 STENOSIS OF LEFT SUBCLAVIAN ARTERY (HCC): ICD-10-CM

## 2018-11-07 LAB — HBA1C MFR BLD: 6.4 %

## 2018-11-07 PROCEDURE — 99213 OFFICE O/P EST LOW 20 MIN: CPT | Performed by: INTERNAL MEDICINE

## 2018-11-07 PROCEDURE — G8484 FLU IMMUNIZE NO ADMIN: HCPCS | Performed by: INTERNAL MEDICINE

## 2018-11-07 PROCEDURE — G8510 SCR DEP NEG, NO PLAN REQD: HCPCS | Performed by: INTERNAL MEDICINE

## 2018-11-07 PROCEDURE — 4040F PNEUMOC VAC/ADMIN/RCVD: CPT | Performed by: INTERNAL MEDICINE

## 2018-11-07 PROCEDURE — 1090F PRES/ABSN URINE INCON ASSESS: CPT | Performed by: INTERNAL MEDICINE

## 2018-11-07 PROCEDURE — G8420 CALC BMI NORM PARAMETERS: HCPCS | Performed by: INTERNAL MEDICINE

## 2018-11-07 PROCEDURE — G8399 PT W/DXA RESULTS DOCUMENT: HCPCS | Performed by: INTERNAL MEDICINE

## 2018-11-07 PROCEDURE — G8598 ASA/ANTIPLAT THER USED: HCPCS | Performed by: INTERNAL MEDICINE

## 2018-11-07 PROCEDURE — G8427 DOCREV CUR MEDS BY ELIG CLIN: HCPCS | Performed by: INTERNAL MEDICINE

## 2018-11-07 PROCEDURE — 1036F TOBACCO NON-USER: CPT | Performed by: INTERNAL MEDICINE

## 2018-11-07 PROCEDURE — 83036 HEMOGLOBIN GLYCOSYLATED A1C: CPT | Performed by: INTERNAL MEDICINE

## 2018-11-07 PROCEDURE — 1123F ACP DISCUSS/DSCN MKR DOCD: CPT | Performed by: INTERNAL MEDICINE

## 2018-11-07 PROCEDURE — 1101F PT FALLS ASSESS-DOCD LE1/YR: CPT | Performed by: INTERNAL MEDICINE

## 2018-11-07 RX ORDER — GLIPIZIDE 5 MG/1
5 TABLET ORAL DAILY
Qty: 30 TABLET | Refills: 5 | Status: SHIPPED | OUTPATIENT
Start: 2018-11-07 | End: 2019-08-16 | Stop reason: SDUPTHER

## 2018-11-07 RX ORDER — METFORMIN HYDROCHLORIDE 500 MG/1
500 TABLET, EXTENDED RELEASE ORAL 2 TIMES DAILY WITH MEALS
Qty: 60 TABLET | Refills: 5 | Status: ON HOLD | OUTPATIENT
Start: 2018-11-07 | End: 2019-03-31 | Stop reason: HOSPADM

## 2018-11-07 RX ORDER — SIMVASTATIN 20 MG
20 TABLET ORAL NIGHTLY
Qty: 30 TABLET | Refills: 5 | Status: ON HOLD | OUTPATIENT
Start: 2018-11-07 | End: 2019-03-31 | Stop reason: HOSPADM

## 2018-11-07 ASSESSMENT — PATIENT HEALTH QUESTIONNAIRE - PHQ9
1. LITTLE INTEREST OR PLEASURE IN DOING THINGS: 0
SUM OF ALL RESPONSES TO PHQ9 QUESTIONS 1 & 2: 0
SUM OF ALL RESPONSES TO PHQ QUESTIONS 1-9: 0
SUM OF ALL RESPONSES TO PHQ QUESTIONS 1-9: 0
2. FEELING DOWN, DEPRESSED OR HOPELESS: 0

## 2018-11-07 ASSESSMENT — ENCOUNTER SYMPTOMS
EYES NEGATIVE: 1
ALLERGIC/IMMUNOLOGIC NEGATIVE: 1
RESPIRATORY NEGATIVE: 1
GASTROINTESTINAL NEGATIVE: 1

## 2019-02-06 ENCOUNTER — OFFICE VISIT (OUTPATIENT)
Dept: INTERNAL MEDICINE CLINIC | Age: 84
End: 2019-02-06
Payer: MEDICARE

## 2019-02-06 VITALS
BODY MASS INDEX: 22.72 KG/M2 | HEART RATE: 87 BPM | OXYGEN SATURATION: 97 % | WEIGHT: 124.2 LBS | SYSTOLIC BLOOD PRESSURE: 120 MMHG | DIASTOLIC BLOOD PRESSURE: 74 MMHG

## 2019-02-06 DIAGNOSIS — I77.1 STENOSIS OF LEFT SUBCLAVIAN ARTERY (HCC): ICD-10-CM

## 2019-02-06 DIAGNOSIS — H40.9 GLAUCOMA OF BOTH EYES, UNSPECIFIED GLAUCOMA TYPE: ICD-10-CM

## 2019-02-06 DIAGNOSIS — C50.912 MALIGNANT NEOPLASM OF LEFT FEMALE BREAST, UNSPECIFIED ESTROGEN RECEPTOR STATUS, UNSPECIFIED SITE OF BREAST (HCC): ICD-10-CM

## 2019-02-06 DIAGNOSIS — G20 PARKINSON DISEASE (HCC): ICD-10-CM

## 2019-02-06 DIAGNOSIS — E11.9 DIABETES MELLITUS TYPE 2 IN NONOBESE (HCC): Primary | ICD-10-CM

## 2019-02-06 DIAGNOSIS — E78.2 MIXED HYPERLIPIDEMIA: ICD-10-CM

## 2019-02-06 LAB — HBA1C MFR BLD: 6.5 %

## 2019-02-06 PROCEDURE — G8598 ASA/ANTIPLAT THER USED: HCPCS | Performed by: INTERNAL MEDICINE

## 2019-02-06 PROCEDURE — 99213 OFFICE O/P EST LOW 20 MIN: CPT | Performed by: INTERNAL MEDICINE

## 2019-02-06 PROCEDURE — 1090F PRES/ABSN URINE INCON ASSESS: CPT | Performed by: INTERNAL MEDICINE

## 2019-02-06 PROCEDURE — 83036 HEMOGLOBIN GLYCOSYLATED A1C: CPT | Performed by: INTERNAL MEDICINE

## 2019-02-06 PROCEDURE — G8427 DOCREV CUR MEDS BY ELIG CLIN: HCPCS | Performed by: INTERNAL MEDICINE

## 2019-02-06 PROCEDURE — 1036F TOBACCO NON-USER: CPT | Performed by: INTERNAL MEDICINE

## 2019-02-06 PROCEDURE — 4040F PNEUMOC VAC/ADMIN/RCVD: CPT | Performed by: INTERNAL MEDICINE

## 2019-02-06 PROCEDURE — G8420 CALC BMI NORM PARAMETERS: HCPCS | Performed by: INTERNAL MEDICINE

## 2019-02-06 PROCEDURE — 1123F ACP DISCUSS/DSCN MKR DOCD: CPT | Performed by: INTERNAL MEDICINE

## 2019-02-06 PROCEDURE — 1101F PT FALLS ASSESS-DOCD LE1/YR: CPT | Performed by: INTERNAL MEDICINE

## 2019-02-06 PROCEDURE — G8484 FLU IMMUNIZE NO ADMIN: HCPCS | Performed by: INTERNAL MEDICINE

## 2019-02-06 ASSESSMENT — ENCOUNTER SYMPTOMS
SHORTNESS OF BREATH: 0
EYES NEGATIVE: 1
ALLERGIC/IMMUNOLOGIC NEGATIVE: 1
GASTROINTESTINAL NEGATIVE: 1
WHEEZING: 0
RESPIRATORY NEGATIVE: 1
COUGH: 0

## 2019-02-06 ASSESSMENT — PATIENT HEALTH QUESTIONNAIRE - PHQ9
SUM OF ALL RESPONSES TO PHQ QUESTIONS 1-9: 0
2. FEELING DOWN, DEPRESSED OR HOPELESS: 0
1. LITTLE INTEREST OR PLEASURE IN DOING THINGS: 0
SUM OF ALL RESPONSES TO PHQ QUESTIONS 1-9: 0
SUM OF ALL RESPONSES TO PHQ9 QUESTIONS 1 & 2: 0

## 2019-02-13 ENCOUNTER — HOSPITAL ENCOUNTER (OUTPATIENT)
Dept: MAMMOGRAPHY | Age: 84
Discharge: HOME | End: 2019-02-13
Payer: MEDICARE

## 2019-02-13 DIAGNOSIS — C50.912 MALIGNANT NEOPLASM OF LEFT FEMALE BREAST, UNSPECIFIED ESTROGEN RECEPTOR STATUS, UNSPECIFIED SITE OF BREAST (HCC): ICD-10-CM

## 2019-02-13 PROCEDURE — 77067 SCR MAMMO BI INCL CAD: CPT

## 2019-03-27 ENCOUNTER — HOSPITAL ENCOUNTER (EMERGENCY)
Age: 84
Discharge: ANOTHER HOSPITAL | End: 2019-03-28
Attending: EMERGENCY MEDICINE
Payer: MEDICARE

## 2019-03-27 ENCOUNTER — APPOINTMENT (OUTPATIENT)
Dept: GENERAL RADIOLOGY | Age: 84
End: 2019-03-27
Payer: MEDICARE

## 2019-03-27 DIAGNOSIS — I24.9 ACS (ACUTE CORONARY SYNDROME) (HCC): Primary | ICD-10-CM
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
## 2019-03-27 DIAGNOSIS — S92.901A CLOSED FRACTURE OF RIGHT FOOT, INITIAL ENCOUNTER: ICD-10-CM

## 2019-03-27 DIAGNOSIS — R74.8 ELEVATED CK: ICD-10-CM

## 2019-03-27 DIAGNOSIS — R31.9 URINARY TRACT INFECTION WITH HEMATURIA, SITE UNSPECIFIED: ICD-10-CM

## 2019-03-27 DIAGNOSIS — A41.9 SEPSIS, DUE TO UNSPECIFIED ORGANISM: ICD-10-CM

## 2019-03-27 DIAGNOSIS — N39.0 URINARY TRACT INFECTION WITH HEMATURIA, SITE UNSPECIFIED: ICD-10-CM

## 2019-03-27 LAB
ALBUMIN SERPL-MCNC: 3.8 GM/DL (ref 3.4–5)
ALP BLD-CCNC: 70 IU/L (ref 40–129)
ALT SERPL-CCNC: 14 U/L (ref 10–40)
ANION GAP SERPL CALCULATED.3IONS-SCNC: 22 MMOL/L (ref 4–16)
AST SERPL-CCNC: 25 IU/L (ref 15–37)
BACTERIA: ABNORMAL /HPF
BASOPHILS ABSOLUTE: 0.1 K/CU MM
BASOPHILS RELATIVE PERCENT: 0.3 % (ref 0–1)
BILIRUB SERPL-MCNC: 0.5 MG/DL (ref 0–1)
BILIRUBIN URINE: NEGATIVE MG/DL
BLOOD, URINE: ABNORMAL
BUN BLDV-MCNC: 26 MG/DL (ref 6–23)
CALCIUM SERPL-MCNC: 9.1 MG/DL (ref 8.3–10.6)
CAST TYPE: NEGATIVE /HPF
CHLORIDE BLD-SCNC: 104 MMOL/L (ref 99–110)
CHP ED QC CHECK: NORMAL
CLARITY: ABNORMAL
CO2: 16 MMOL/L (ref 21–32)
COLOR: YELLOW
CREAT SERPL-MCNC: 0.9 MG/DL (ref 0.6–1.1)
CRYSTAL TYPE: NEGATIVE /HPF
DIFFERENTIAL TYPE: ABNORMAL
EOSINOPHILS ABSOLUTE: 0 K/CU MM
EOSINOPHILS RELATIVE PERCENT: 0.1 % (ref 0–3)
EPITHELIAL CELLS, UA: ABNORMAL /HPF
GFR AFRICAN AMERICAN: >60 ML/MIN/1.73M2
GFR NON-AFRICAN AMERICAN: 59 ML/MIN/1.73M2
GLUCOSE BLD-MCNC: 155 MG/DL
GLUCOSE BLD-MCNC: 155 MG/DL (ref 70–99)
GLUCOSE BLD-MCNC: 167 MG/DL (ref 70–99)
GLUCOSE, URINE: NEGATIVE MG/DL
HCT VFR BLD CALC: 41.3 % (ref 37–47)
HEMOGLOBIN: 13 GM/DL (ref 12.5–16)
IMMATURE NEUTROPHIL %: 0.4 % (ref 0–0.43)
KETONES, URINE: 40 MG/DL
LEUKOCYTE ESTERASE, URINE: ABNORMAL
LYMPHOCYTES ABSOLUTE: 0.9 K/CU MM
LYMPHOCYTES RELATIVE PERCENT: 5.8 % (ref 24–44)
MCH RBC QN AUTO: 27.3 PG (ref 27–31)
MCHC RBC AUTO-ENTMCNC: 31.5 % (ref 32–36)
MCV RBC AUTO: 86.6 FL (ref 78–100)
MONOCYTES ABSOLUTE: 1 K/CU MM
MONOCYTES RELATIVE PERCENT: 6.8 % (ref 0–4)
NITRITE URINE, QUANTITATIVE: POSITIVE
PDW BLD-RTO: 13.2 % (ref 11.7–14.9)
PH, URINE: 5 (ref 5–8)
PLATELET # BLD: 298 K/CU MM (ref 140–440)
PMV BLD AUTO: 10.5 FL (ref 7.5–11.1)
POTASSIUM SERPL-SCNC: 3.7 MMOL/L (ref 3.5–5.1)
PROTEIN UA: 100 MG/DL
RBC # BLD: 4.77 M/CU MM (ref 4.2–5.4)
RBC URINE: ABNORMAL /HPF (ref 0–6)
SEGMENTED NEUTROPHILS ABSOLUTE COUNT: 13.1 K/CU MM
SEGMENTED NEUTROPHILS RELATIVE PERCENT: 86.6 % (ref 36–66)
SODIUM BLD-SCNC: 142 MMOL/L (ref 135–145)
SPECIFIC GRAVITY UA: 1.02 (ref 1–1.03)
TOTAL CK: 874 IU/L (ref 26–140)
TOTAL IMMATURE NEUTOROPHIL: 0.06 K/CU MM
TOTAL PROTEIN: 6.3 GM/DL (ref 6.4–8.2)
TROPONIN T: NORMAL NG/ML
UROBILINOGEN, URINE: 0.2 MG/DL (ref 0.2–1)
WBC # BLD: 15.1 K/CU MM (ref 4–10.5)
WBC UA: ABNORMAL /HPF (ref 0–5)

## 2019-03-27 PROCEDURE — 96372 THER/PROPH/DIAG INJ SC/IM: CPT

## 2019-03-27 PROCEDURE — 99285 EMERGENCY DEPT VISIT HI MDM: CPT

## 2019-03-27 PROCEDURE — 73630 X-RAY EXAM OF FOOT: CPT

## 2019-03-27 PROCEDURE — 2580000003 HC RX 258: Performed by: EMERGENCY MEDICINE

## 2019-03-27 PROCEDURE — 83605 ASSAY OF LACTIC ACID: CPT

## 2019-03-27 PROCEDURE — 93005 ELECTROCARDIOGRAM TRACING: CPT | Performed by: EMERGENCY MEDICINE

## 2019-03-27 PROCEDURE — 85025 COMPLETE CBC W/AUTO DIFF WBC: CPT

## 2019-03-27 PROCEDURE — 82962 GLUCOSE BLOOD TEST: CPT

## 2019-03-27 PROCEDURE — 6370000000 HC RX 637 (ALT 250 FOR IP): Performed by: EMERGENCY MEDICINE

## 2019-03-27 PROCEDURE — 82550 ASSAY OF CK (CPK): CPT

## 2019-03-27 PROCEDURE — 87040 BLOOD CULTURE FOR BACTERIA: CPT

## 2019-03-27 PROCEDURE — 87086 URINE CULTURE/COLONY COUNT: CPT

## 2019-03-27 PROCEDURE — 87077 CULTURE AEROBIC IDENTIFY: CPT

## 2019-03-27 PROCEDURE — 84484 ASSAY OF TROPONIN QUANT: CPT

## 2019-03-27 PROCEDURE — 73610 X-RAY EXAM OF ANKLE: CPT

## 2019-03-27 PROCEDURE — 87186 SC STD MICRODIL/AGAR DIL: CPT

## 2019-03-27 PROCEDURE — 6360000002 HC RX W HCPCS: Performed by: EMERGENCY MEDICINE

## 2019-03-27 PROCEDURE — 80053 COMPREHEN METABOLIC PANEL: CPT

## 2019-03-27 PROCEDURE — 96365 THER/PROPH/DIAG IV INF INIT: CPT

## 2019-03-27 PROCEDURE — 71045 X-RAY EXAM CHEST 1 VIEW: CPT

## 2019-03-27 PROCEDURE — 81001 URINALYSIS AUTO W/SCOPE: CPT

## 2019-03-27 RX ORDER — 0.9 % SODIUM CHLORIDE 0.9 %
1000 INTRAVENOUS SOLUTION INTRAVENOUS ONCE
Status: COMPLETED | OUTPATIENT
Start: 2019-03-27 | End: 2019-03-28

## 2019-03-27 RX ORDER — ASPIRIN 81 MG/1
324 TABLET, CHEWABLE ORAL ONCE
Status: COMPLETED | OUTPATIENT
Start: 2019-03-27 | End: 2019-03-27

## 2019-03-27 RX ORDER — 0.9 % SODIUM CHLORIDE 0.9 %
1000 INTRAVENOUS SOLUTION INTRAVENOUS ONCE
Status: COMPLETED | OUTPATIENT
Start: 2019-03-27 | End: 2019-03-27

## 2019-03-27 RX ADMIN — ASPIRIN 81 MG 324 MG: 81 TABLET ORAL at 22:16

## 2019-03-27 RX ADMIN — SODIUM CHLORIDE 1000 ML: 9 INJECTION, SOLUTION INTRAVENOUS at 23:52

## 2019-03-27 RX ADMIN — CARBIDOPA AND LEVODOPA 1 TABLET: 25; 100 TABLET ORAL at 22:03

## 2019-03-27 RX ADMIN — ENOXAPARIN SODIUM 60 MG: 60 INJECTION SUBCUTANEOUS at 23:52

## 2019-03-27 RX ADMIN — SODIUM CHLORIDE 1000 ML: 9 INJECTION, SOLUTION INTRAVENOUS at 22:03

## 2019-03-27 RX ADMIN — CEFTRIAXONE 1 G: 1 INJECTION, POWDER, FOR SOLUTION INTRAMUSCULAR; INTRAVENOUS at 23:53

## 2019-03-28 ENCOUNTER — DIRECT ADMIT ORDERS (OUTPATIENT)
Dept: CARDIOLOGY | Age: 84
End: 2019-03-28

## 2019-03-28 ENCOUNTER — HOSPITAL ENCOUNTER (INPATIENT)
Age: 84
LOS: 3 days | Discharge: HOME | DRG: 281 | End: 2019-03-31
Attending: INTERNAL MEDICINE | Admitting: HOSPITALIST
Payer: MEDICARE

## 2019-03-28 VITALS
DIASTOLIC BLOOD PRESSURE: 73 MMHG | HEART RATE: 90 BPM | BODY MASS INDEX: 22.82 KG/M2 | RESPIRATION RATE: 16 BRPM | SYSTOLIC BLOOD PRESSURE: 133 MMHG | OXYGEN SATURATION: 100 % | WEIGHT: 124 LBS | HEIGHT: 62 IN | TEMPERATURE: 97.9 F

## 2019-03-28 LAB
ANION GAP SERPL CALCULATED.3IONS-SCNC: 10 MMOL/L (ref 4–16)
APTT: 38.1 SECONDS (ref 21.2–33)
APTT: 49.4 SECONDS (ref 21.2–33)
APTT: 61.3 SECONDS (ref 21.2–33)
APTT: 85 SECONDS (ref 21.2–33)
BUN BLDV-MCNC: 19 MG/DL (ref 6–23)
CALCIUM SERPL-MCNC: 8.6 MG/DL (ref 8.3–10.6)
CHLORIDE BLD-SCNC: 107 MMOL/L (ref 99–110)
CO2: 25 MMOL/L (ref 21–32)
CREAT SERPL-MCNC: 0.7 MG/DL (ref 0.6–1.1)
EKG ATRIAL RATE: 84 BPM
EKG ATRIAL RATE: 92 BPM
EKG ATRIAL RATE: 93 BPM
EKG DIAGNOSIS: NORMAL
EKG P AXIS: 66 DEGREES
EKG P AXIS: 71 DEGREES
EKG P AXIS: 74 DEGREES
EKG P-R INTERVAL: 146 MS
EKG P-R INTERVAL: 148 MS
EKG P-R INTERVAL: 158 MS
EKG Q-T INTERVAL: 386 MS
EKG Q-T INTERVAL: 392 MS
EKG Q-T INTERVAL: 396 MS
EKG QRS DURATION: 84 MS
EKG QRS DURATION: 84 MS
EKG QRS DURATION: 88 MS
EKG QTC CALCULATION (BAZETT): 463 MS
EKG QTC CALCULATION (BAZETT): 477 MS
EKG QTC CALCULATION (BAZETT): 492 MS
EKG R AXIS: 52 DEGREES
EKG R AXIS: 53 DEGREES
EKG R AXIS: 61 DEGREES
EKG T AXIS: 29 DEGREES
EKG T AXIS: 29 DEGREES
EKG T AXIS: 31 DEGREES
EKG VENTRICULAR RATE: 84 BPM
EKG VENTRICULAR RATE: 92 BPM
EKG VENTRICULAR RATE: 93 BPM
ESTIMATED AVERAGE GLUCOSE: 146 MG/DL
GFR AFRICAN AMERICAN: >60 ML/MIN/1.73M2
GFR NON-AFRICAN AMERICAN: >60 ML/MIN/1.73M2
GLUCOSE BLD-MCNC: 106 MG/DL (ref 70–99)
GLUCOSE BLD-MCNC: 136 MG/DL (ref 70–99)
GLUCOSE BLD-MCNC: 145 MG/DL (ref 70–99)
GLUCOSE BLD-MCNC: 174 MG/DL (ref 70–99)
GLUCOSE BLD-MCNC: 289 MG/DL (ref 70–99)
HBA1C MFR BLD: 6.7 % (ref 4.2–6.3)
HCT VFR BLD CALC: 40.1 % (ref 37–47)
HEMOGLOBIN: 12.6 GM/DL (ref 12.5–16)
LACTIC ACID, SEPSIS: 1.9 MMOL/L (ref 0.5–1.9)
LV EF: 50 %
LVEF MODALITY: NORMAL
MCH RBC QN AUTO: 27.5 PG (ref 27–31)
MCHC RBC AUTO-ENTMCNC: 31.4 % (ref 32–36)
MCV RBC AUTO: 87.4 FL (ref 78–100)
PDW BLD-RTO: 13.2 % (ref 11.7–14.9)
PLATELET # BLD: 285 K/CU MM (ref 140–440)
PMV BLD AUTO: 10.7 FL (ref 7.5–11.1)
POTASSIUM SERPL-SCNC: 4.1 MMOL/L (ref 3.5–5.1)
RBC # BLD: 4.59 M/CU MM (ref 4.2–5.4)
SODIUM BLD-SCNC: 142 MMOL/L (ref 135–145)
TROPONIN T: 0.28 NG/ML
TROPONIN T: 0.31 NG/ML
WBC # BLD: 13.8 K/CU MM (ref 4–10.5)

## 2019-03-28 PROCEDURE — 36415 COLL VENOUS BLD VENIPUNCTURE: CPT

## 2019-03-28 PROCEDURE — 6360000002 HC RX W HCPCS: Performed by: INTERNAL MEDICINE

## 2019-03-28 PROCEDURE — 2580000003 HC RX 258: Performed by: HOSPITALIST

## 2019-03-28 PROCEDURE — 80048 BASIC METABOLIC PNL TOTAL CA: CPT

## 2019-03-28 PROCEDURE — 84484 ASSAY OF TROPONIN QUANT: CPT

## 2019-03-28 PROCEDURE — 85730 THROMBOPLASTIN TIME PARTIAL: CPT

## 2019-03-28 PROCEDURE — 93306 TTE W/DOPPLER COMPLETE: CPT

## 2019-03-28 PROCEDURE — 85027 COMPLETE CBC AUTOMATED: CPT

## 2019-03-28 PROCEDURE — 83036 HEMOGLOBIN GLYCOSYLATED A1C: CPT

## 2019-03-28 PROCEDURE — 6370000000 HC RX 637 (ALT 250 FOR IP): Performed by: INTERNAL MEDICINE

## 2019-03-28 PROCEDURE — 99221 1ST HOSP IP/OBS SF/LOW 40: CPT | Performed by: INTERNAL MEDICINE

## 2019-03-28 PROCEDURE — 93005 ELECTROCARDIOGRAM TRACING: CPT | Performed by: INTERNAL MEDICINE

## 2019-03-28 PROCEDURE — 93010 ELECTROCARDIOGRAM REPORT: CPT | Performed by: INTERNAL MEDICINE

## 2019-03-28 PROCEDURE — 82962 GLUCOSE BLOOD TEST: CPT

## 2019-03-28 PROCEDURE — 6360000002 HC RX W HCPCS: Performed by: HOSPITALIST

## 2019-03-28 PROCEDURE — 6370000000 HC RX 637 (ALT 250 FOR IP): Performed by: HOSPITALIST

## 2019-03-28 PROCEDURE — 2140000000 HC CCU INTERMEDIATE R&B

## 2019-03-28 RX ORDER — CARVEDILOL 6.25 MG/1
6.25 TABLET ORAL 2 TIMES DAILY WITH MEALS
Status: DISCONTINUED | OUTPATIENT
Start: 2019-03-28 | End: 2019-03-28

## 2019-03-28 RX ORDER — DEXTROSE MONOHYDRATE 50 MG/ML
100 INJECTION, SOLUTION INTRAVENOUS PRN
Status: DISCONTINUED | OUTPATIENT
Start: 2019-03-28 | End: 2019-03-31 | Stop reason: HOSPADM

## 2019-03-28 RX ORDER — CARVEDILOL 3.12 MG/1
3.12 TABLET ORAL 2 TIMES DAILY WITH MEALS
Status: DISCONTINUED | OUTPATIENT
Start: 2019-03-28 | End: 2019-03-31 | Stop reason: HOSPADM

## 2019-03-28 RX ORDER — HEPARIN SODIUM 1000 [USP'U]/ML
30 INJECTION, SOLUTION INTRAVENOUS; SUBCUTANEOUS PRN
Status: DISCONTINUED | OUTPATIENT
Start: 2019-03-28 | End: 2019-03-30

## 2019-03-28 RX ORDER — HEPARIN SODIUM 1000 [USP'U]/ML
60 INJECTION, SOLUTION INTRAVENOUS; SUBCUTANEOUS PRN
Status: DISCONTINUED | OUTPATIENT
Start: 2019-03-28 | End: 2019-03-30

## 2019-03-28 RX ORDER — SODIUM CHLORIDE 0.9 % (FLUSH) 0.9 %
10 SYRINGE (ML) INJECTION PRN
Status: DISCONTINUED | OUTPATIENT
Start: 2019-03-28 | End: 2019-03-29

## 2019-03-28 RX ORDER — SIMVASTATIN 20 MG
20 TABLET ORAL NIGHTLY
Status: DISCONTINUED | OUTPATIENT
Start: 2019-03-28 | End: 2019-03-29 | Stop reason: SDUPTHER

## 2019-03-28 RX ORDER — HEPARIN SODIUM 10000 [USP'U]/100ML
12 INJECTION, SOLUTION INTRAVENOUS CONTINUOUS
Status: DISCONTINUED | OUTPATIENT
Start: 2019-03-28 | End: 2019-03-30

## 2019-03-28 RX ORDER — ATORVASTATIN CALCIUM 40 MG/1
40 TABLET, FILM COATED ORAL NIGHTLY
Status: DISCONTINUED | OUTPATIENT
Start: 2019-03-28 | End: 2019-03-28 | Stop reason: SDUPTHER

## 2019-03-28 RX ORDER — GLIPIZIDE 5 MG/1
5 TABLET ORAL DAILY
Status: DISCONTINUED | OUTPATIENT
Start: 2019-03-28 | End: 2019-03-28

## 2019-03-28 RX ORDER — HEPARIN SODIUM 1000 [USP'U]/ML
60 INJECTION, SOLUTION INTRAVENOUS; SUBCUTANEOUS ONCE
Status: COMPLETED | OUTPATIENT
Start: 2019-03-28 | End: 2019-03-28

## 2019-03-28 RX ORDER — SODIUM CHLORIDE 0.9 % (FLUSH) 0.9 %
10 SYRINGE (ML) INJECTION EVERY 12 HOURS SCHEDULED
Status: CANCELLED | OUTPATIENT
Start: 2019-03-28

## 2019-03-28 RX ORDER — METHYLPREDNISOLONE 16 MG/1
32 TABLET ORAL 2 TIMES DAILY
Status: DISCONTINUED | OUTPATIENT
Start: 2019-03-28 | End: 2019-03-29

## 2019-03-28 RX ORDER — SODIUM CHLORIDE 0.9 % (FLUSH) 0.9 %
10 SYRINGE (ML) INJECTION PRN
Status: CANCELLED | OUTPATIENT
Start: 2019-03-28

## 2019-03-28 RX ORDER — SODIUM CHLORIDE 0.9 % (FLUSH) 0.9 %
10 SYRINGE (ML) INJECTION EVERY 12 HOURS SCHEDULED
Status: DISCONTINUED | OUTPATIENT
Start: 2019-03-28 | End: 2019-03-29

## 2019-03-28 RX ORDER — ONDANSETRON 2 MG/ML
4 INJECTION INTRAMUSCULAR; INTRAVENOUS EVERY 6 HOURS PRN
Status: DISCONTINUED | OUTPATIENT
Start: 2019-03-28 | End: 2019-03-31 | Stop reason: HOSPADM

## 2019-03-28 RX ORDER — DEXTROSE MONOHYDRATE 25 G/50ML
12.5 INJECTION, SOLUTION INTRAVENOUS PRN
Status: DISCONTINUED | OUTPATIENT
Start: 2019-03-28 | End: 2019-03-31 | Stop reason: HOSPADM

## 2019-03-28 RX ORDER — ASPIRIN 81 MG/1
81 TABLET, CHEWABLE ORAL DAILY
Status: DISCONTINUED | OUTPATIENT
Start: 2019-03-29 | End: 2019-03-29 | Stop reason: SDUPTHER

## 2019-03-28 RX ORDER — NICOTINE POLACRILEX 4 MG
15 LOZENGE BUCCAL PRN
Status: DISCONTINUED | OUTPATIENT
Start: 2019-03-28 | End: 2019-03-31 | Stop reason: HOSPADM

## 2019-03-28 RX ORDER — SODIUM CHLORIDE 9 MG/ML
INJECTION, SOLUTION INTRAVENOUS CONTINUOUS
Status: CANCELLED | OUTPATIENT
Start: 2019-03-29

## 2019-03-28 RX ORDER — DIAZEPAM 5 MG/1
5 TABLET ORAL
Status: CANCELLED | OUTPATIENT
Start: 2019-03-28 | End: 2019-03-28

## 2019-03-28 RX ORDER — DIPHENHYDRAMINE HCL 25 MG
25 TABLET ORAL
Status: CANCELLED | OUTPATIENT
Start: 2019-03-28 | End: 2019-03-28

## 2019-03-28 RX ADMIN — HEPARIN SODIUM AND DEXTROSE 12 UNITS/KG/HR: 10000; 5 INJECTION INTRAVENOUS at 04:04

## 2019-03-28 RX ADMIN — INSULIN LISPRO 3 UNITS: 100 INJECTION, SOLUTION INTRAVENOUS; SUBCUTANEOUS at 18:26

## 2019-03-28 RX ADMIN — INSULIN LISPRO 1 UNITS: 100 INJECTION, SOLUTION INTRAVENOUS; SUBCUTANEOUS at 22:00

## 2019-03-28 RX ADMIN — HEPARIN SODIUM 3220 UNITS: 1000 INJECTION, SOLUTION INTRAVENOUS; SUBCUTANEOUS at 04:03

## 2019-03-28 RX ADMIN — CARVEDILOL 3.12 MG: 3.12 TABLET, FILM COATED ORAL at 08:32

## 2019-03-28 RX ADMIN — CEFTRIAXONE 1 G: 1 INJECTION, POWDER, FOR SOLUTION INTRAMUSCULAR; INTRAVENOUS at 21:56

## 2019-03-28 RX ADMIN — METHYLPREDNISOLONE 32 MG: 16 TABLET ORAL at 11:33

## 2019-03-28 RX ADMIN — SODIUM CHLORIDE, PRESERVATIVE FREE 10 ML: 5 INJECTION INTRAVENOUS at 08:33

## 2019-03-28 RX ADMIN — CARBIDOPA AND LEVODOPA 1 TABLET: 25; 100 TABLET ORAL at 08:32

## 2019-03-28 RX ADMIN — CARBIDOPA AND LEVODOPA 1 TABLET: 25; 100 TABLET ORAL at 15:25

## 2019-03-28 RX ADMIN — SIMVASTATIN 20 MG: 20 TABLET, FILM COATED ORAL at 21:56

## 2019-03-28 RX ADMIN — CARVEDILOL 3.12 MG: 3.12 TABLET, FILM COATED ORAL at 18:26

## 2019-03-28 RX ADMIN — CARBIDOPA AND LEVODOPA 1 TABLET: 25; 100 TABLET ORAL at 21:56

## 2019-03-28 RX ADMIN — METHYLPREDNISOLONE 32 MG: 16 TABLET ORAL at 21:56

## 2019-03-28 ASSESSMENT — PAIN SCALES - GENERAL
PAINLEVEL_OUTOF10: 0
PAINLEVEL_OUTOF10: 0

## 2019-03-28 NOTE — H&P
HISTORY AND PHYSICAL  (Hospitalist, Internal Medicine)  IDENTIFYING INFORMATION   PATIENT:  Tori Reyes  MRN:  [id number]  ADMIT DATE: 3/28/2019  TIME OF EVALUATION: 3/28/2019 2:00 AM    CHIEF COMPLAINT     Fall [address] home  [address]   Tori Reyes is a 80 y.o. female presents with fall at home    44-year-old female lives at home by herself  Soren Dover to the ground had difficulty  Getting up and was on the floor for 6 hours  Until she was found by her neighbors. She was taken to the ER at [facility] where she was found to have elevated   Troponin as well as UTI. Patient has a history of CAD. She was transferred here as a direct admit for further Evaluation and treatment. sshe denies any chest pains,, shortness of breath, lightheadedness or headache. She denies any urinary symptoms. Patient admitted and started on anticoagulation with heparin infusion  PAST MEDICAL, SURGICAL, FAMILY, and SOCIAL HISTORY     Past Medical History:   Diagnosis Date    CAD (coronary artery disease)     suspected, refused w/u    Coronary artery disease suspected. refused cardio     Suspected. Refused to see cardio.  Diabetes mellitus ([address] since 4/08    Foot ulcer ([address] left     resolved    Glaucoma     Dr. Kellee Thakkar    Hyperlipidemia     Left breast mass 3/22/2017    Pt seen Dr Christine Ballesteros for that. Mammo. Neg. She thinks it scar tissue.      Malignant neoplasm of left breast ([address] 8/31/2015    Left breast, Radiation therapy, lumpectomy 1985    Parkinson disease [facility]) 2008    dr. Makeda Virgen following    Stenosis of left subclavian artery [facility])     refused w/u     Past Surgical History:   Procedure Laterality Date    APPENDECTOMY      BREAST LUMPECTOMY Left 1995    MARY AND BSO Right     TONSILLECTOMY       Family History   Problem Relation Age of Onset    Heart Disease Mother     COPD Father      Family Hx of HTN  Family Hx as reviewed above, otherwise non-contributory  Social History     Socioeconomic History    Marital status:      Spouse name: Not on file    Number of children: Not on file    Years of education: Not on file    Highest education level: Not on file   Occupational History    Not on file   Social Needs    Financial resource strain: Not on file    Food insecurity:     Worry: Not on file     Inability: Not on file    Transportation needs:     Medical: Not on file     Non-medical: Not on file   Tobacco Use    Smoking status: Never Smoker    Smokeless tobacco: Never Used   Substance and Sexual Activity    Alcohol use:  Yes     Alcohol/week: 0.0 oz    Drug use: No    Sexual activity: Not Currently     Partners: Male   Lifestyle    Physical activity:     Days per week: Not on file     Minutes per session: Not on file    Stress: Not on file   Relationships    Social connections:     Talks on phone: Not on file     Gets together: Not on file     Attends Evangelical service: Not on file     Active member of club or organization: Not on file     Attends meetings of clubs or organizations: Not on file     Relationship status: Not on file    Intimate partner violence:     Fear of current or ex partner: Not on file     Emotionally abused: Not on file     Physically abused: Not on file     Forced sexual activity: Not on file   Other Topics Concern    Not on file   Social History Narrative    Not on file       MEDICATIONS   Medications Prior to Admission  Medications Prior to Admission: Glucose Blood (IVETTE BREEZE 2 TEST) DISK, Patient testing once daily and prn, DX=E11.9  UNABLE TO FIND, Please dispense one [place] Blood glucose monitoring kit, DX E11.9, testing once daily and prn  metFORMIN (GLUCOPHAGE-XR) 500 MG extended release tablet, Take 1 tablet by mouth 2 times daily (with meals)  simvastatin (ZOCOR) 20 MG tablet, Take 1 tablet by mouth nightly  glipiZIDE (GLUCOTROL) 5 MG tablet, Take 1 tablet by mouth daily  fluticasone (FLONASE) 50 MCG/ACT nasal spray, 1 spray by Nasal route daily  UNABLE TO FIND, Please dispense Freestyle test strips, testing once daily and prn, DX=E11.9  UNABLE TO FIND, Please dispense one Freestyle meter, testing once daily and PRN, DX=E11.9  UNABLE TO FIND, Please dispense lancets (freestyle), testing once daily and PRN, DX=E11.9  dorzolamide-timolol (COSOPT) 22.3-6.8 MG/ML ophthalmic solution, Place 1 drop into both eyes 2 times daily  carbidopa-levodopa (SINEMET)  MG per tablet, Take 1 tablet by mouth 3 times daily   aspirin 81 MG tablet, Take 81 mg by mouth daily. Current Medications  No current facility-administered medications for this encounter. Allergies  Allergies   Allergen Reactions    Eggs Or Egg-Derived Products [Eggs Or Egg-Derived Products] Hives    Iodine      Iodine contrast       REVIEW OF SYSTEMS   Within above limitations. 14 point review of systems reviewed. Pertinent positive or negative as per HPI or otherwise negative per 14 point systems review. PHYSICAL EXAM     Wt Readings from Last 3 Encounters:   03/27/19 124 lb (56.2 kg)   02/06/19 124 lb 3.2 oz (56.3 kg)   11/07/18 121 lb 9.6 oz (55.2 kg)       not currently breastfeeding. General - AAO x 3  Psych - Appropriate affect/speech. No agitation  Eyes - Eye lids intact. No scleral icterus  ENT - Lips wnl. External ear clear/dry/intact. No thyromegaly on inspection  Neuro - No gross peripheral or central neuro deficits on inspection  Heart - Sinus. RRR. S1 and S2 present. No added HS/murmurs appreciated. No elevated JVD appreciated  Lung - Adequate air entry b/l, No crackles/wheezes appreciated  GI - Soft. No guarding/rigidity. No hepatosplenomegaly/ascites. BS+   - No CVA/suprapubic tenderness or palpable bladder distension  Skin - Intact. No rash/petechiae/ecchymosis. Warm extremities. Skin cuts on the left big toe  MSK - Joints with normal ROM.  No joint swellings    Lines/Drains/Airways/Wounds:  [unfilled]    LABS AND IMAGING   CBC  [unfilled]    Last 3

## 2019-03-28 NOTE — ED PROVIDER NOTES
EMERGENCY DEPARTMENT H&P    Patient Name:  Allan Johnson  :  1932  MRN:  [id number]  Date of Visit:  3/27/2019    Triage Chief Complaint:   Fall (Patient brought by EMS after being found on floor by neighbors. Patient trip and fell around 2 pm. Patient was on floor until 21 [phone] when found. Patient has no pain c/o. Patient did have a low b/p so the EMS felt she needed to come to ED to be evaluate. )    HPI:  Allan Johnson is a 80 y.o. female who presents to the ED by EMS after she was found laying on her floor at home by neighbors. Patient reports that around 2 PM, she was walking down the stairs however slipped and fell down 3 stairs and was unable to stand up on her own afterwards. She denies any injuries from the fall other than mild pain in her right ankle and right foot. She believes that she slipped on the carpet when she fell. She denies any head injury or loss of consciousness. She denies any other areas of pain such as chest pain, neck pain, back pain, hip pain, any other joint pain, abdominal pain. She reports she was unable to stand back up because she could not get her footing. She laid on the ground for approximately 6 hours until neighbors entered her home and found her on the ground. At this time, she denies any complaints other than mild pain in her right foot and ankle. She notes that she has not yet taken her medications for Parkinson's. She feels very dehydrated and thirsty. She denies any other recent symptoms or history. ROS:  Review of Systems  Ten point ROS was performed and is negative except as stated in HPI above. Review of systems obtained from the patient. Past Medical History:   Diagnosis Date    CAD (coronary artery disease)     suspected, refused w/u    Coronary artery disease suspected. refused cardio     Suspected. Refused to see cardio.     Diabetes mellitus ([facility] [address] since     Foot ulcer ([facility] [address] left     resolved    Glaucoma     Dr. Bettie Julien Hyperlipidemia     Left breast mass 3/22/2017    Pt seen Dr Cameron Galvan for that. Mammo. Neg. She thinks it scar tissue.  Malignant neoplasm of left breast ([facility] [address] 8/31/2015    Left breast, Radiation therapy, lumpectomy 1985    Parkinson disease ([facility] [address] 2008    dr. Johnston Cancel following    Stenosis of left subclavian artery [facility])     refused w/u     Past Surgical History:   Procedure Laterality Date    APPENDECTOMY      BREAST LUMPECTOMY Left 1995    MARY AND BSO Right     TONSILLECTOMY       Family History   Problem Relation Age of Onset    Heart Disease Mother     COPD Father      Social History     Socioeconomic History    Marital status:      Spouse name: Not on file    Number of children: Not on file    Years of education: Not on file    Highest education level: Not on file   Occupational History    Not on file   Social Needs    Financial resource strain: Not on file    Food insecurity:     Worry: Not on file     Inability: Not on file    Transportation needs:     Medical: Not on file     Non-medical: Not on file   Tobacco Use    Smoking status: Never Smoker    Smokeless tobacco: Never Used   Substance and Sexual Activity    Alcohol use:  Yes     Alcohol/week: 0.0 oz    Drug use: No    Sexual activity: Not Currently     Partners: Male   Lifestyle    Physical activity:     Days per week: Not on file     Minutes per session: Not on file    Stress: Not on file   Relationships    Social connections:     Talks on phone: Not on file     Gets together: Not on file     Attends Taoist service: Not on file     Active member of club or organization: Not on file     Attends meetings of clubs or organizations: Not on file     Relationship status: Not on file    Intimate partner violence:     Fear of current or ex partner: Not on file     Emotionally abused: Not on file     Physically abused: Not on file     Forced sexual activity: Not on file   Other Topics Concern    Not on file   Social History Narrative    Not on file     Current Facility-Administered Medications   Medication Dose Route Frequency Provider Last Rate Last Dose    0.9 % sodium chloride bolus  1,000 mL Intravenous Once Gabriel Junedolores, DO        carbidopa-levodopa (SINEMET)  MG per tablet 1 tablet  1 tablet Oral Once Gabriel Junedolores, DO         Current Outpatient Medications   Medication Sig Dispense Refill    metFORMIN (GLUCOPHAGE-XR) 500 MG extended release tablet Take 1 tablet by mouth 2 times daily (with meals) 60 tablet 5    simvastatin (ZOCOR) 20 MG tablet Take 1 tablet by mouth nightly 30 tablet 5    glipiZIDE (GLUCOTROL) 5 MG tablet Take 1 tablet by mouth daily 30 tablet 5    fluticasone (FLONASE) 50 MCG/ACT nasal spray 1 spray by Nasal route daily 1 Bottle 0    dorzolamide-timolol (COSOPT) 22.3-6.8 MG/ML ophthalmic solution Place 1 drop into both eyes 2 times daily  3    carbidopa-levodopa (SINEMET)  MG per tablet Take 1 tablet by mouth 3 times daily  90 tablet     aspirin 81 MG tablet Take 81 mg by mouth daily.  Glucose Blood (ALMA ROSA BREEZE 2 TEST) DISK Patient testing once daily and prn, DX=E11.9 50 each 3    UNABLE TO FIND Please dispense one Alma Rosa Antoni Blood glucose monitoring kit, DX E11.9, testing once daily and prn 1 kit 0    UNABLE TO FIND Please dispense Freestyle test strips, testing once daily and prn, DX=E11.9 50 strip 5    UNABLE TO FIND Please dispense one Freestyle meter, testing once daily and PRN, DX=E11.9 1 kit 0    UNABLE TO FIND Please dispense lancets (freestyle), testing once daily and PRN, DX=E11.9 50 each 5     Allergies   Allergen Reactions    Eggs Or Egg-Derived Products [Eggs Or Egg-Derived Products] Hives    Iodine      Iodine contrast       Physical Exam:  ED TRIAGE VITALS  BP: 99/75, Temp: 97.9 °F (36.6 °C), Pulse: 93, Resp: 14, SpO2: 99 %    GENERAL: Appears stated age, awake and alert, no acute distress, non-toxic appearing. GCS 15. A&O x 4. HEENT: NC / AT.  No raccoon eyes, Cox sign, palpable skull defect, scalp or facial hematoma/wounds, CSF rhinorrhea or otorrhea, hemotympanum or other signs of trauma. Oropharynx with dry oral mucosal membranes. Normal sclera / conjunctiva. Nose normal. External ears, canals and TM's unremarkable B/L.  EOMI without nystagmus. PERRL and normal size. NECK: Trachea midline. No overt adenopathy or masses. No JVD. Normal ROM testing without pain. No midline or paraspinal TTP noted. CARDIOVASCULAR: RRR. Normal s1/s2. No murmur. Peripheral pulses and perfusion intact. No LE edema noted. RESPIRATORY: Lungs clear to auscultation bilaterally. No respiratory distress or accessory muscle usage. ABDOMEN: Soft, no tenderness to palpation noted. Non-distended, no guarding / rebound. SKIN: Warm. Dry. Intact. No obvious skin abnormalities noted. MUSCULOSKELETAL: There is mild tenderness palpation of the dorsum of the right foot and right ankle. No obvious deformities noted. Mildly limited range of motion of the right foot and ankle. No other joint tenderness, swelling or deformities noted. BACK: no paraspinal / mid-line vertebral / flank tenderness noted. NEURO: The patient is awake, alert, interactive. Follows commands and answers questions appropriately. Speech is fluent with intact cognition. CN 2-12 intact. MS +5/5 x 4 extremities. No ataxia. No focal deficits noted. PSYCHIATRIC: Appropriate.  Cooperative with exam.     I have reviewed and interpreted all of the currently available lab results from this visit:  Results for orders placed or performed during the hospital encounter of 03/27/19   CBC Auto Differential   Result Value Ref Range    WBC 15.1 (H) 4.0 - 10.5 K/CU MM    RBC 4.77 4.2 - 5.4 M/CU MM    Hemoglobin 13.0 12.5 - 16.0 GM/DL    Hematocrit 41.3 37 - 47 %    MCV 86.6 78 - 100 FL    MCH 27.3 27 - 31 PG    MCHC 31.5 (L) 32.0 - 36.0 %    RDW 13.2 11.7 - 14.9 %    Platelets 564 525 - 319 K/CU MM    MPV 10.5 7.5 - 11.1 FL    Differential Type AUTOMATED DIFFERENTIAL     Segs Relative 86.6 (H) 36 - 66 %    Lymphocytes % 5.8 (L) 24 - 44 %    Monocytes % 6.8 (H) 0 - 4 %    Eosinophils % 0.1 0 - 3 %    Basophils % 0.3 0 - 1 %    Segs Absolute 13.1 K/CU MM    Lymphocytes # 0.9 K/CU MM    Monocytes # 1.0 K/CU MM    Eosinophils # 0.0 K/CU MM    Basophils # 0.1 K/CU MM    Immature Neutrophil % 0.4 0 - 0.43 %    Total Immature Neutrophil 0.06 K/CU MM   Comprehensive Metabolic Panel w/ Reflex to MG   Result Value Ref Range    Sodium 142 135 - 145 MMOL/L    Potassium 3.7 3.5 - 5.1 MMOL/L    Chloride 104 99 - 110 mMol/L    CO2 16 (L) 21 - 32 MMOL/L    BUN 26 (H) 6 - 23 MG/DL    CREATININE 0.9 0.6 - 1.1 MG/DL    Glucose 167 (H) 70 - 99 MG/DL    Calcium 9.1 8.3 - 10.6 MG/DL    Alb 3.8 3.4 - 5.0 GM/DL    Total Protein 6.3 (L) 6.4 - 8.2 GM/DL    Total Bilirubin 0.5 0.0 - 1.0 MG/DL    ALT 14 10 - 40 U/L    AST 25 15 - 37 IU/L    Alkaline Phosphatase 70 40 - 129 IU/L    GFR Non- 59 (L) >60 mL/min/1.73m2    GFR African American >60 >60 mL/min/1.73m2    Anion Gap 22 (H) 4 - 16   Troponin   Result Value Ref Range    Troponin T  <0.01 NG/ML     TROP 0.308 CALLED AND RB TO [facility] [id number]   Urinalysis Reflex to Culture   Result Value Ref Range    Color, UA YELLOW (A) YELLOW    Clarity, UA CLOUDY (A) CLEAR    Glucose, Urine NEGATIVE NEGATIVE MG/DL    Bilirubin Urine NEGATIVE NEGATIVE MG/DL    Ketones, Urine 40 (A) NEGATIVE MG/DL    Specific Gravity, UA 1.020 1.001 - 1.035    Blood, Urine 2+ (A) NEGATIVE    pH, Urine 5.0 5.0 - 8.0    Protein,  (A) NEGATIVE MG/DL    Urobilinogen, Urine 0.2 0.2 - 1.0 MG/DL    Nitrite Urine, Quantitative POSITIVE (A) NEGATIVE    Leukocyte Esterase, Urine 2+ (A) NEGATIVE    RBC, UA 12 TO 18 0 - 6 /HPF    WBC, UA 50 TO 60 0 - 5 /HPF    Epi Cells 3 TO 5 /HPF    Cast Type NEGATIVE (A) NO CAST FORMS SEEN /HPF    Bacteria, UA MANY (A) NEGATIVE /HPF    Crystal Type NEGATIVE NEGATIVE /HPF   CK   Result Value Ref Range    Total  (H) 26 - 140 IU/L   POCT Glucose   Result Value Ref Range    Glucose 155 mg/dL    QC OK? y    POCT Glucose   Result Value Ref Range    POC Glucose 155 (H) 70 - 99 MG/DL   EKG 12 Lead   Result Value Ref Range    Ventricular Rate 92 BPM    Atrial Rate 92 BPM    P-R Interval 158 ms    QRS Duration 88 ms    Q-T Interval 386 ms    QTc Calculation (Bazett) 477 ms    P Axis 74 degrees    R Axis 52 degrees    T Axis 29 degrees    Diagnosis       Sinus rhythm with occasional premature ventricular complexes  Possible Left atrial enlargement  Low voltage QRS  Early repolarization  Borderline ECG  When compared with ECG of 27-MAR-2019 21:26,  premature ventricular complexes are now present         Radiographs:  Radiologist's Report Reviewed:  XR CHEST PORTABLE   Final Result   No acute cardiopulmonary process      Left breast calcifications noted. Could relate to prior trauma, prior   surgery or prior injury. Mammographic correlation could be beneficial.         XR ANKLE RIGHT (MIN 3 VIEWS)   Final Result   1. Age-indeterminate acute appearing articular fracture medial base of the   proximal phalanx right 2nd toe. 2. Chronic appearing possibly healed articular avulsion fracture lateral   margin of the base of the proximal phalanx right little toe. 3. No acute osseous abnormality evident right ankle. Follow up imaging is recommended if pain persists or worsens. XR FOOT RIGHT (MIN 3 VIEWS)   Final Result   1. Age-indeterminate acute appearing articular fracture medial base of the   proximal phalanx right 2nd toe. 2. Chronic appearing possibly healed articular avulsion fracture lateral   margin of the base of the proximal phalanx right little toe. 3. No acute osseous abnormality evident right ankle. Follow up imaging is recommended if pain persists or worsens.              EKG: (All EKG's are interpreted by myself in the absence of a cardiologist)    Rhythm: NSR  Rate: 89  Axis: Normal  Ectopy: None  Conduction: Normal  ST Segments: ST elevation noted in leads 1, V3, V4, V5 and V6. No reciprocal ST depressions appreciated. T Waves: T-wave inversion noted in lead 3. No other acute findings. There is no previous EKG on file for comparison. Medications administered:  Medications   cefTRIAXone (ROCEPHIN) 1 g IVPB in 50 mL D5W minibag (1 g Intravenous New Bag 3/27/19 2353)   0.9 % sodium chloride bolus (1,000 mLs Intravenous New Bag 3/27/19 2352)   0.9 % sodium chloride bolus (0 mLs Intravenous Stopped 3/27/19 2353)   carbidopa-levodopa (SINEMET)  MG per tablet 1 tablet (1 tablet Oral Given 3/27/19 2203)   aspirin chewable tablet 324 mg (324 mg Oral Given 3/27/19 2216)   enoxaparin (LOVENOX) injection 60 mg (60 mg Subcutaneous Given 3/27/19 2352)       ED COURSE & MDM:  Nursing notes and vital signs were reviewed. The patient's medical record and available pertinent information was also reviewed if available. Pt presents with hypotension noted on vital signs. Vitals otherwise unremarkable. Please see history and exam above. Workup initiated as above. Patient denied need for any symptomatic treatment such as pain or nausea medication at this time. EKG was obtained and was concerning due to multiple areas of ST elevation noted as above. No reciprocal ST depressions noted however. Of note, patient completely denies any chest pain or pressure, difficulty breathing or other symptoms associated with MI at this time. EKG was immediately digitally transmitted to cardiologist listed on call Dr. Tika Baugh via perfect serve however I was not able to receive a response from him. I then digitally transmitted EKG to cardiologist Dr. Rufino Fox and a consult was placed to him by phone. 10:02 PM  Received phone-call back from cardiologist Dr. Rufino Fox. He has reviewed the EKG and also agrees that the EKG does not fit full STEMI criteria at this time.  He agrees with checking troponin and other blood work however recommends transferring to [facility] regardless of results due to EKG abnormalities noted. Patient's troponin resulted positive at 0.308. I again contacted cardiologist Dr. Ze Brown. 10:56 PM  I received a phone call from Dr. Ze Brown. Due to positive troponin, he recommends giving 1 mg/kg of Lovenox if the patient's renal function is stable. Patient is already been treated with full dose aspirin. He recommends NPO status after midnight and he will perform left heart catheterization tomorrow morning likely. Remainder of patient's workup was reviewed and was remarkable for leukocytosis, elevated CK, evidence of UTI on urinalysis. Patient did meet sepsis criteria with heart rate >90 and leukocytosis grade and 12,000. Blood cultures drawn and lactate level ordered. Antibiotic treatment ordered with Rocephin. Additional bolus of normal saline was given due to elevated CK and sepsis. I then contacted [facility] Hospitalist Dr. Lorne Marrero. He is aware of all findings of the patient's workup. He has accepted the patient for transfer and admission. Critical Care time was 60 minutes. Total number of minutes spent in direct and indirect care of this critically ill patient excludes procedure time. Clinical Impression:  1. ACS (acute coronary syndrome) ([facility] [address]    2. Fall, initial encounter    3. Closed fracture of right foot, initial encounter    4. Sepsis, due to unspecified organism ([address]    5. Urinary tract infection with hematuria, site unspecified    6. Elevated CK      Comment: Please note this report has been produced using speech recognition software and may contain errors related to that system including errors in grammar, punctuation, and spelling, as well as words and phrases that may be inappropriate. If there are any questions or concerns please feel free to contact the dictating provider for clarification.     Electronically Signed:        Taran Mcrae DO  03/28/19 0018

## 2019-03-28 NOTE — H&P
HISTORY AND PHYSICAL  (Hospitalist, Internal Medicine)  IDENTIFYING INFORMATION   PATIENT:  Maribel Tena  MRN:  [id number]  ADMIT DATE: 3/27/2019  TIME OF EVALUATION: 3/28/2019 3:00 AM    CHIEF COMPLAINT       HISTORY OF PRESENT ILLNESS   Maribel Tena is a 80 y.o. female presents with       PAST MEDICAL, SURGICAL, FAMILY, and SOCIAL HISTORY     Past Medical History:   Diagnosis Date    CAD (coronary artery disease)     suspected, refused w/u    Coronary artery disease suspected. refused cardio     Suspected. Refused to see cardio.  Diabetes mellitus ([facility] [address] since 4/08    Foot ulcer ([facility] [address] left     resolved    Glaucoma     Dr. Pal Dayton    Hyperlipidemia     Left breast mass 3/22/2017    Pt seen Dr Charity Thibodeaux for that. Mammo. Neg. She thinks it scar tissue.  [facility]2015    Left breast, Radiation therapy, lumpectomy 1985    Parkinson disease ([facility] 2008    dr. Bita Baldwin following    Stenosis of left subclavian artery [facility])     refused w/u     Past Surgical History:   Procedure Laterality Date    APPENDECTOMY      BREAST LUMPECTOMY Left 1995    MARY AND BSO Right     TONSILLECTOMY       Family History   Problem Relation Age of Onset    Heart Disease Mother     COPD Father      Family Hx of HTN  Family Hx as reviewed above, otherwise non-contributory  Social History     Socioeconomic History    Marital status:      Spouse name: Not on file    Number of children: Not on file    Years of education: Not on file    Highest education level: Not on file   Occupational History    Not on file   Social Needs    Financial resource strain: Not on file    Food insecurity:     Worry: Not on file     Inability: Not on file    Transportation needs:     Medical: Not on file     Non-medical: Not on file   Tobacco Use    Smoking status: Never Smoker    Smokeless tobacco: Never Used   Substance and Sexual Activity    Alcohol use:  Yes     Alcohol/week: 0.0 oz    Drug use: No    Sexual activity: Not Currently     Partners: Male   Lifestyle    Physical activity:     Days per week: Not on file     Minutes per session: Not on file    Stress: Not on file   Relationships    Social connections:     Talks on phone: Not on file     Gets together: Not on file     Attends Mu-ism service: Not on file     Active member of club or organization: Not on file     Attends meetings of clubs or organizations: Not on file     Relationship status: Not on file    Intimate partner violence:     Fear of current or ex partner: Not on file     Emotionally abused: Not on file     Physically abused: Not on file     Forced sexual activity: Not on file   Other Topics Concern    Not on file   Social History Narrative    Not on file       MEDICATIONS   Medications Prior to Admission  Not in a hospital admission. Current Medications  No current facility-administered medications for this encounter. No current outpatient medications on file.      Facility-Administered Medications Ordered in Other Encounters   Medication Dose Route Frequency Provider Last Rate Last Dose    carbidopa-levodopa (SINEMET)  MG per tablet 1 tablet  1 tablet Oral TID Mara Schulz MD        glipiZIDE (GLUCOTROL) tablet 5 mg  5 mg Oral Daily Mara Schulz MD        simvastatin (ZOCOR) tablet 20 mg  20 mg Oral Nightly Mraa Schulz MD        glucose (GLUTOSE) 40 % oral gel 15 g  15 g Oral PRN Mara Schulz MD        dextrose 50 % solution 12.5 g  12.5 g Intravenous PRN Mara Schulz MD        glucagon (rDNA) injection 1 mg  1 mg Intramuscular PRN Mara Schulz MD        dextrose 5 % solution  100 mL/hr Intravenous PRN Mara Schulz MD        sodium chloride flush 0.9 % injection 10 mL  10 mL Intravenous 2 times per day Mara Schulz MD        sodium chloride flush 0.9 % injection 10 mL  10 mL Intravenous PRN Mara Schulz MD        magnesium hydroxide (MILK OF MAGNESIA) 400 MG/5ML suspension 30 mL  30 mL Oral Daily PRN Dave Good MD        ondansetron [facility]) injection 4 mg  4 mg Intravenous Q6H PRN Dave Good MD        atorvastatin (LIPITOR) tablet 40 mg  40 mg Oral Nightly Dave Good MD       [facility] [START ON 3/29/2019] aspirin chewable tablet 81 mg  81 mg Oral Daily Dave Good MD        enoxaparin (LOVENOX) injection 40 mg  40 mg Subcutaneous Daily Dave Good MD        insulin lispro (HUMALOG) injection vial 0-6 Units  0-6 Units Subcutaneous TID WC Dave Good MD        insulin lispro (HUMALOG) injection vial 0-3 Units  0-3 Units Subcutaneous Nightly Dave Good MD        cefTRIAXone (ROCEPHIN) 1 g in dextrose 5 % 50 mL IVPB  1 g Intravenous Q24H Dave Good MD             Allergies  Allergies   Allergen Reactions    Eggs Or Egg-Derived Products [Eggs Or Egg-Derived Products] Hives    Iodine      Iodine contrast       REVIEW OF SYSTEMS   Within above limitations. 14 point review of systems reviewed. Pertinent positive or negative as per HPI or otherwise negative per 14 point systems review. PHYSICAL EXAM     Wt Readings from Last 3 Encounters:   03/27/19 124 lb (56.2 kg)   02/06/19 124 lb 3.2 oz (56.3 kg)   11/07/18 121 lb 9.6 oz (55.2 kg)       Blood pressure 133/73, pulse 90, temperature 97.9 °F (36.6 °C), temperature source Oral, resp. rate 16, height 5' 2\" (1.575 m), weight 124 lb (56.2 kg), SpO2 100 %, not currently breastfeeding. General - AAO x 3  Psych - Appropriate affect/speech. No agitation  Eyes - Eye lids intact. No scleral icterus  ENT - Lips wnl. External ear clear/dry/intact. No thyromegaly on inspection  Neuro - No gross peripheral or central neuro deficits on inspection  Heart - Sinus. RRR. S1 and S2 present. No added HS/murmurs appreciated. No elevated JVD appreciated  Lung - Adequate air entry b/l, No crackles/wheezes appreciated  GI - Soft. No guarding/rigidity. No hepatosplenomegaly/ascites.  BS+   - No CVA/suprapubic tenderness or palpable bladder distension  Skin - Intact. No rash/petechiae/ecchymosis. Warm extremities  MSK - Joints with normal ROM.  No joint swellings    Lines/Drains/Airways/Wounds:  [unfilled]    LABS AND IMAGING   CBC  [unfilled]    Last 3 Hemoglobin  Lab Results   Component Value Date    HGB 13.0 03/27/2019    HGB 13.6 10/12/2018    HGB 13.4 09/22/2017     Last 3 WBC/ANC  Lab Results   Component Value Date    WBC 15.1 03/27/2019    WBC 6.8 10/12/2018    WBC 6.4 09/22/2017     No components found for: GRNLOCTYABS  Last 3 Platelets  No results found for: PLATELET  Chemistry  [unfilled]  [unfilled]  No results found for: LDH  Coagulation Studies  No results found for: PTT, INR  Liver Function Studies  Lab Results   Component Value Date    ALT 14 03/27/2019    AST 25 03/27/2019    ALKPHOS 70 03/27/2019       Recent Imaging        Relevant labs and imaging reviewed    ASSESSMENT AND PLAN             Case d/w ED physician    Hospitalist, Internal Medicine  3/28/2019 at 3:00 AM

## 2019-03-28 NOTE — PROGRESS NOTES
Full consult dictated # [id number]  NSTEMI  Has Iodine Allergy, will cath tomorrow after pre-treating  Informed consent obtained.   ASA & Mallampati 2:2

## 2019-03-28 NOTE — PROGRESS NOTES
Hospitalist Progress Note      Name:  Nishant Montiel /Age/Sex: 1932  (80 y.o. female)   MRN & CSN:  [id number] & [id number] Admission Date/Time: 3/28/2019  1:41 AM   Location:  [address] [place] [address] PCP: Tiburcio Mccauley MD         Hospital Day: 1    Assessment and Plan:   Nishant Montiel is a 80 y.o.  female  who presents     · nstemi  · deconditioning      - premedicate in AM for [facility] tomorrow for nstemi  - ptot for aru placement. Diet DIET CARDIAC; No Caffeine   DVT Prophylaxis [] Lovenox, []  Heparin, [] SCDs, []No VTE prophylaxis, patient ambulating   GI Prophylaxis [] PPI, [] H2 Blocker, [] No GI prophylaxis, patient is receiving diet/Tube Feeds   Code Status Full Code   Disposition Patient requires continued admission due to pending cardiac evaluation   MDM [] Low, [] Moderate,[]  High  Patient's risk as above due to acute illness with unknown etiology     History of Present Illness:     Pt S&E. No family at bedside. Denies cp now. Denies ha or sob. 10-14 point ROS reviewed negative, unless as noted above    Objective:   No intake or output data in the 24 hours ending 19 1328   Vitals:   Vitals:    19 0832   BP: (!) 94/51   Pulse: 73   Resp:    Temp:    SpO2:      Physical Exam:    GEN Awake female, laying in bed in no apparent distress. Appears given age. EYES Pupils are equally round. No scleral discharge  HENT Atraumatic and symmetric head  NECK No apparent thyromegaly  RESP Symmetric chest movement while on room air. CARDIO/VASC Peripheral pulses equal bilaterally and palpable. No peripheral edema. GI Abdomen is not distended. Rectal exam deferred.  Jang catheter is not present. HEME/LYMPH No petechiae or ecchymoses. MSK Spontaneous movement of BL upper extremities  SKIN Normal coloration, warm, dry. NEURO Cranial nerves appear grossly intact  PSYCH Awake, alert. Oriented x4.     Medications:   Medications:    carbidopa-levodopa  1 tablet Oral TID    simvastatin  20 mg Oral Nightly    sodium chloride flush  10 mL Intravenous 2 times per day    [START ON 3/29/2019] aspirin  81 mg Oral Daily    insulin lispro  0-6 Units Subcutaneous TID WC    insulin lispro  0-3 Units Subcutaneous Nightly    cefTRIAXone (ROCEPHIN) IV  1 g Intravenous Q24H    carvedilol  3.125 mg Oral BID WC    methylPREDNISolone  32 mg Oral BID      Infusions:    dextrose      heparin (porcine) 12 Units/kg/hr (03/28/19 1030)     PRN Meds:   glucose 15 g PRN   dextrose 12.5 g PRN   glucagon (rDNA) 1 mg PRN   dextrose 100 mL/hr PRN   sodium chloride flush 10 mL PRN   magnesium hydroxide 30 mL Daily PRN   ondansetron 4 mg Q6H PRN   heparin (porcine) 60 Units/kg PRN   heparin (porcine) 30 Units/kg PRN         Electronically signed by Clearence Kanner, DO on 3/28/2019 at 1:28 PM

## 2019-03-29 ENCOUNTER — APPOINTMENT (OUTPATIENT)
Dept: ULTRASOUND IMAGING | Age: 84
DRG: 281 | End: 2019-03-29
Attending: INTERNAL MEDICINE
Payer: MEDICARE

## 2019-03-29 LAB
ACTIVATED CLOTTING TIME, LOW RANGE: 141 SEC
ANION GAP SERPL CALCULATED.3IONS-SCNC: 8 MMOL/L (ref 4–16)
APTT: 24 SECONDS (ref 21.2–33)
APTT: 36.1 SECONDS (ref 21.2–33)
APTT: 44.5 SECONDS (ref 21.2–33)
BACTERIA: ABNORMAL /HPF
BASE EXCESS MIXED: ABNORMAL (ref 0–2.3)
BILIRUBIN URINE: NEGATIVE MG/DL
BLOOD, URINE: ABNORMAL
BUN BLDV-MCNC: 17 MG/DL (ref 6–23)
CALCIUM SERPL-MCNC: 8.4 MG/DL (ref 8.3–10.6)
CARBON MONOXIDE, BLOOD: 2 % (ref 0–5)
CHLORIDE BLD-SCNC: 108 MMOL/L (ref 99–110)
CLARITY: CLEAR
CO2 CONTENT: 26.6 MMOL/L (ref 19–24)
CO2: 24 MMOL/L (ref 21–32)
COLOR: YELLOW
COMMENT: ABNORMAL
CREAT SERPL-MCNC: 0.5 MG/DL (ref 0.6–1.1)
ESTIMATED AVERAGE GLUCOSE: 148 MG/DL
GFR AFRICAN AMERICAN: >60 ML/MIN/1.73M2
GFR NON-AFRICAN AMERICAN: >60 ML/MIN/1.73M2
GLUCOSE BLD-MCNC: 135 MG/DL (ref 70–99)
GLUCOSE BLD-MCNC: 182 MG/DL (ref 70–99)
GLUCOSE BLD-MCNC: 189 MG/DL (ref 70–99)
GLUCOSE BLD-MCNC: 200 MG/DL (ref 70–99)
GLUCOSE BLD-MCNC: 249 MG/DL (ref 70–99)
GLUCOSE, URINE: 50 MG/DL
HBA1C MFR BLD: 6.8 % (ref 4.2–6.3)
HCO3 ARTERIAL: 25.4 MMOL/L (ref 18–23)
HCT VFR BLD CALC: 37.9 % (ref 37–47)
HEMOGLOBIN: 12.2 GM/DL (ref 12.5–16)
KETONES, URINE: ABNORMAL MG/DL
LEUKOCYTE ESTERASE, URINE: ABNORMAL
LV EF: 55 %
LVEF MODALITY: NORMAL
MCH RBC QN AUTO: 27.6 PG (ref 27–31)
MCHC RBC AUTO-ENTMCNC: 32.2 % (ref 32–36)
MCV RBC AUTO: 85.7 FL (ref 78–100)
METHEMOGLOBIN ARTERIAL: 1.2 %
NITRITE URINE, QUANTITATIVE: NEGATIVE
O2 SATURATION: 94.3 % (ref 96–97)
PCO2 ARTERIAL: 40 MMHG (ref 32–45)
PDW BLD-RTO: 13.4 % (ref 11.7–14.9)
PH BLOOD: 7.41 (ref 7.34–7.45)
PH, URINE: 5 (ref 5–8)
PLATELET # BLD: 274 K/CU MM (ref 140–440)
PMV BLD AUTO: 11 FL (ref 7.5–11.1)
PO2 ARTERIAL: 69 MMHG (ref 75–100)
POTASSIUM SERPL-SCNC: 4.5 MMOL/L (ref 3.5–5.1)
PROTEIN UA: 30 MG/DL
RBC # BLD: 4.42 M/CU MM (ref 4.2–5.4)
RBC URINE: 1793 /HPF (ref 0–6)
SODIUM BLD-SCNC: 140 MMOL/L (ref 135–145)
SPECIFIC GRAVITY UA: 1.04 (ref 1–1.03)
SPECIFIC GRAVITY UA: ABNORMAL (ref 1–1.03)
SQUAMOUS EPITHELIAL: 1 /HPF
TRICHOMONAS: ABNORMAL /HPF
UROBILINOGEN, URINE: NORMAL MG/DL (ref 0.2–1)
WBC # BLD: 8.4 K/CU MM (ref 4–10.5)
WBC UA: 13 /HPF (ref 0–5)

## 2019-03-29 PROCEDURE — 2580000003 HC RX 258: Performed by: INTERNAL MEDICINE

## 2019-03-29 PROCEDURE — 82962 GLUCOSE BLOOD TEST: CPT

## 2019-03-29 PROCEDURE — 93458 L HRT ARTERY/VENTRICLE ANGIO: CPT

## 2019-03-29 PROCEDURE — B2111ZZ FLUOROSCOPY OF MULTIPLE CORONARY ARTERIES USING LOW OSMOLAR CONTRAST: ICD-10-PCS | Performed by: INTERNAL MEDICINE

## 2019-03-29 PROCEDURE — 6360000002 HC RX W HCPCS: Performed by: INTERNAL MEDICINE

## 2019-03-29 PROCEDURE — 80048 BASIC METABOLIC PNL TOTAL CA: CPT

## 2019-03-29 PROCEDURE — 6370000000 HC RX 637 (ALT 250 FOR IP): Performed by: STUDENT IN AN ORGANIZED HEALTH CARE EDUCATION/TRAINING PROGRAM

## 2019-03-29 PROCEDURE — 81001 URINALYSIS AUTO W/SCOPE: CPT

## 2019-03-29 PROCEDURE — 2100000000 HC CCU R&B

## 2019-03-29 PROCEDURE — 4A023N7 MEASUREMENT OF CARDIAC SAMPLING AND PRESSURE, LEFT HEART, PERCUTANEOUS APPROACH: ICD-10-PCS | Performed by: INTERNAL MEDICINE

## 2019-03-29 PROCEDURE — 83036 HEMOGLOBIN GLYCOSYLATED A1C: CPT

## 2019-03-29 PROCEDURE — 85347 COAGULATION TIME ACTIVATED: CPT

## 2019-03-29 PROCEDURE — 6370000000 HC RX 637 (ALT 250 FOR IP): Performed by: SURGERY

## 2019-03-29 PROCEDURE — 6370000000 HC RX 637 (ALT 250 FOR IP): Performed by: INTERNAL MEDICINE

## 2019-03-29 PROCEDURE — 6360000004 HC RX CONTRAST MEDICATION

## 2019-03-29 PROCEDURE — 93458 L HRT ARTERY/VENTRICLE ANGIO: CPT | Performed by: INTERNAL MEDICINE

## 2019-03-29 PROCEDURE — C1894 INTRO/SHEATH, NON-LASER: HCPCS

## 2019-03-29 PROCEDURE — 6360000002 HC RX W HCPCS

## 2019-03-29 PROCEDURE — 82803 BLOOD GASES ANY COMBINATION: CPT

## 2019-03-29 PROCEDURE — 94761 N-INVAS EAR/PLS OXIMETRY MLT: CPT

## 2019-03-29 PROCEDURE — B2151ZZ FLUOROSCOPY OF LEFT HEART USING LOW OSMOLAR CONTRAST: ICD-10-PCS | Performed by: INTERNAL MEDICINE

## 2019-03-29 PROCEDURE — 2500000003 HC RX 250 WO HCPCS

## 2019-03-29 PROCEDURE — 2709999900 HC NON-CHARGEABLE SUPPLY

## 2019-03-29 PROCEDURE — 85730 THROMBOPLASTIN TIME PARTIAL: CPT

## 2019-03-29 PROCEDURE — 85027 COMPLETE CBC AUTOMATED: CPT

## 2019-03-29 PROCEDURE — 36415 COLL VENOUS BLD VENIPUNCTURE: CPT

## 2019-03-29 RX ORDER — CEFAZOLIN SODIUM 2 G/50ML
2 SOLUTION INTRAVENOUS
Status: CANCELLED | OUTPATIENT
Start: 2019-04-01 | End: 2019-04-01

## 2019-03-29 RX ORDER — TIMOLOL MALEATE 5 MG/ML
1 SOLUTION/ DROPS OPHTHALMIC 2 TIMES DAILY
Status: DISCONTINUED | OUTPATIENT
Start: 2019-03-29 | End: 2019-03-31 | Stop reason: HOSPADM

## 2019-03-29 RX ORDER — DIAZEPAM 5 MG/1
5 TABLET ORAL
Status: DISCONTINUED | OUTPATIENT
Start: 2019-03-29 | End: 2019-03-29

## 2019-03-29 RX ORDER — SODIUM CHLORIDE 0.9 % (FLUSH) 0.9 %
10 SYRINGE (ML) INJECTION PRN
Status: DISCONTINUED | OUTPATIENT
Start: 2019-03-29 | End: 2019-03-31 | Stop reason: HOSPADM

## 2019-03-29 RX ORDER — SIMVASTATIN 20 MG
20 TABLET ORAL NIGHTLY
Status: DISCONTINUED | OUTPATIENT
Start: 2019-03-29 | End: 2019-03-29

## 2019-03-29 RX ORDER — DIPHENHYDRAMINE HCL 25 MG
25 TABLET ORAL
Status: DISCONTINUED | OUTPATIENT
Start: 2019-03-29 | End: 2019-03-29

## 2019-03-29 RX ORDER — ATORVASTATIN CALCIUM 40 MG/1
80 TABLET, FILM COATED ORAL NIGHTLY
Status: DISCONTINUED | OUTPATIENT
Start: 2019-03-29 | End: 2019-03-31 | Stop reason: HOSPADM

## 2019-03-29 RX ORDER — DORZOLAMIDE HYDROCHLORIDE AND TIMOLOL MALEATE 20; 5 MG/ML; MG/ML
1 SOLUTION/ DROPS OPHTHALMIC 2 TIMES DAILY
Status: DISCONTINUED | OUTPATIENT
Start: 2019-03-29 | End: 2019-03-29 | Stop reason: CLARIF

## 2019-03-29 RX ORDER — SODIUM CHLORIDE 9 MG/ML
INJECTION, SOLUTION INTRAVENOUS CONTINUOUS
Status: DISCONTINUED | OUTPATIENT
Start: 2019-03-29 | End: 2019-03-31

## 2019-03-29 RX ORDER — SODIUM CHLORIDE 9 MG/ML
INJECTION, SOLUTION INTRAVENOUS CONTINUOUS
Status: DISCONTINUED | OUTPATIENT
Start: 2019-03-29 | End: 2019-03-30

## 2019-03-29 RX ORDER — ACETAMINOPHEN 325 MG/1
650 TABLET ORAL EVERY 4 HOURS PRN
Status: DISCONTINUED | OUTPATIENT
Start: 2019-03-29 | End: 2019-03-31 | Stop reason: HOSPADM

## 2019-03-29 RX ORDER — CHLORHEXIDINE GLUCONATE 4 G/100ML
SOLUTION TOPICAL SEE ADMIN INSTRUCTIONS
Status: CANCELLED | OUTPATIENT
Start: 2019-03-31

## 2019-03-29 RX ORDER — DORZOLAMIDE HCL 20 MG/ML
1 SOLUTION/ DROPS OPHTHALMIC 2 TIMES DAILY
Status: DISCONTINUED | OUTPATIENT
Start: 2019-03-29 | End: 2019-03-31 | Stop reason: HOSPADM

## 2019-03-29 RX ORDER — ASPIRIN 81 MG/1
81 TABLET ORAL DAILY
Status: DISCONTINUED | OUTPATIENT
Start: 2019-03-29 | End: 2019-03-31 | Stop reason: HOSPADM

## 2019-03-29 RX ORDER — CHLORHEXIDINE GLUCONATE 0.12 MG/ML
30 RINSE ORAL SEE ADMIN INSTRUCTIONS
Status: CANCELLED | OUTPATIENT
Start: 2019-03-31

## 2019-03-29 RX ORDER — CARVEDILOL 3.12 MG/1
3.12 TABLET ORAL ONCE
Status: CANCELLED | OUTPATIENT
Start: 2019-04-01

## 2019-03-29 RX ORDER — SODIUM CHLORIDE 0.9 % (FLUSH) 0.9 %
10 SYRINGE (ML) INJECTION PRN
Status: DISCONTINUED | OUTPATIENT
Start: 2019-03-29 | End: 2019-03-29

## 2019-03-29 RX ORDER — SODIUM CHLORIDE 0.9 % (FLUSH) 0.9 %
10 SYRINGE (ML) INJECTION EVERY 12 HOURS SCHEDULED
Status: DISCONTINUED | OUTPATIENT
Start: 2019-03-29 | End: 2019-03-29

## 2019-03-29 RX ORDER — NITROGLYCERIN 0.4 MG/1
0.4 TABLET SUBLINGUAL EVERY 5 MIN PRN
Status: DISCONTINUED | OUTPATIENT
Start: 2019-03-29 | End: 2019-03-31 | Stop reason: HOSPADM

## 2019-03-29 RX ORDER — SODIUM CHLORIDE 0.9 % (FLUSH) 0.9 %
10 SYRINGE (ML) INJECTION EVERY 12 HOURS SCHEDULED
Status: DISCONTINUED | OUTPATIENT
Start: 2019-03-29 | End: 2019-03-31 | Stop reason: HOSPADM

## 2019-03-29 RX ORDER — SODIUM PHOSPHATE, DIBASIC AND SODIUM PHOSPHATE, MONOBASIC 7; 19 G/133ML; G/133ML
1 ENEMA RECTAL ONCE
Status: DISCONTINUED | OUTPATIENT
Start: 2019-03-31 | End: 2019-03-31 | Stop reason: HOSPADM

## 2019-03-29 RX ORDER — CLOPIDOGREL BISULFATE 75 MG/1
75 TABLET ORAL DAILY
Status: DISCONTINUED | OUTPATIENT
Start: 2019-03-29 | End: 2019-03-31 | Stop reason: HOSPADM

## 2019-03-29 RX ADMIN — SODIUM CHLORIDE: 9 INJECTION, SOLUTION INTRAVENOUS at 07:48

## 2019-03-29 RX ADMIN — INSULIN LISPRO 2 UNITS: 100 INJECTION, SOLUTION INTRAVENOUS; SUBCUTANEOUS at 15:21

## 2019-03-29 RX ADMIN — ASPIRIN 81 MG: 81 TABLET, COATED ORAL at 13:53

## 2019-03-29 RX ADMIN — DORZOLAMIDE HYDROCHLORIDE 1 DROP: 20 SOLUTION/ DROPS OPHTHALMIC at 21:32

## 2019-03-29 RX ADMIN — METHYLPREDNISOLONE 32 MG: 16 TABLET ORAL at 07:48

## 2019-03-29 RX ADMIN — CEFTRIAXONE 1 G: 1 INJECTION, POWDER, FOR SOLUTION INTRAMUSCULAR; INTRAVENOUS at 23:55

## 2019-03-29 RX ADMIN — ATORVASTATIN CALCIUM 80 MG: 40 TABLET, FILM COATED ORAL at 21:31

## 2019-03-29 RX ADMIN — INSULIN LISPRO 2 UNITS: 100 INJECTION, SOLUTION INTRAVENOUS; SUBCUTANEOUS at 17:28

## 2019-03-29 RX ADMIN — CARBIDOPA AND LEVODOPA 1 TABLET: 25; 100 TABLET ORAL at 21:31

## 2019-03-29 RX ADMIN — TIMOLOL MALEATE 1 DROP: 5 SOLUTION OPHTHALMIC at 21:32

## 2019-03-29 RX ADMIN — CARBIDOPA AND LEVODOPA 1 TABLET: 25; 100 TABLET ORAL at 13:53

## 2019-03-29 RX ADMIN — HEPARIN SODIUM AND DEXTROSE 12 UNITS/KG/HR: 10000; 5 INJECTION INTRAVENOUS at 15:41

## 2019-03-29 RX ADMIN — CARVEDILOL 3.12 MG: 3.12 TABLET, FILM COATED ORAL at 21:31

## 2019-03-29 RX ADMIN — CLOPIDOGREL BISULFATE 75 MG: 75 TABLET ORAL at 21:31

## 2019-03-29 RX ADMIN — SODIUM CHLORIDE: 9 INJECTION, SOLUTION INTRAVENOUS at 22:25

## 2019-03-29 ASSESSMENT — PAIN SCALES - GENERAL
PAINLEVEL_OUTOF10: 0

## 2019-03-29 NOTE — PROGRESS NOTES
Dr. Benedict Height at bedside. States to restart Heparin at 12 units/kg/hr once PTT drawn.  Will carry out orders    Stoney Jamil RN

## 2019-03-29 NOTE — CONSULTS
Discussed case with RN, patient, and family. Patient with card procedure today, just cleared to sit this afternoon. Reviewed referral from cardiologist.    Family interested in eval.    RN and therapy staff feel it is too soon after procedure to be appropriate. Patient not very interested in eval.    Patient plan is to go home with support and service. Intend to f/u over weekend.      Pk, [address] [id number]  2:57 PM 3/29/2019

## 2019-03-29 NOTE — PROGRESS NOTES
Pt transported to 2002 on monitor via inpatient bed by this RN and Alan Schlatter. Rt femoral site assessment completed by [facility] RN. Rt femoral site remains free of bleeding or hematoma. Bedside report given. VSS. Family updated.

## 2019-03-29 NOTE — PROGRESS NOTES
Pt A+O and spoke with family regarding CABG on Monday and pt does not want to proceed - wanting medically managed only. Dr. Savanna Gilbert made aware.  Vein mapping and Carotid U/S discontinued     Pramod Carcamo RN

## 2019-03-29 NOTE — PROGRESS NOTES
Received from cath lab for sheath pull. Monitor and alarms on. VSS. Rt femoral site is free of bleeding or hematoma. 4fr sheath intact.

## 2019-03-30 LAB
ANION GAP SERPL CALCULATED.3IONS-SCNC: 7 MMOL/L (ref 4–16)
APTT: 102.6 SECONDS (ref 21.2–33)
APTT: 75.5 SECONDS (ref 21.2–33)
BASOPHILS ABSOLUTE: 0 K/CU MM
BASOPHILS RELATIVE PERCENT: 0.3 % (ref 0–1)
BUN BLDV-MCNC: 23 MG/DL (ref 6–23)
CALCIUM SERPL-MCNC: 8 MG/DL (ref 8.3–10.6)
CHLORIDE BLD-SCNC: 110 MMOL/L (ref 99–110)
CO2: 25 MMOL/L (ref 21–32)
CREAT SERPL-MCNC: 0.7 MG/DL (ref 0.6–1.1)
CULTURE: ABNORMAL
CULTURE: ABNORMAL
DIFFERENTIAL TYPE: ABNORMAL
EOSINOPHILS ABSOLUTE: 0.1 K/CU MM
EOSINOPHILS RELATIVE PERCENT: 0.5 % (ref 0–3)
GFR AFRICAN AMERICAN: >60 ML/MIN/1.73M2
GFR NON-AFRICAN AMERICAN: >60 ML/MIN/1.73M2
GLUCOSE BLD-MCNC: 110 MG/DL (ref 70–99)
GLUCOSE BLD-MCNC: 118 MG/DL (ref 70–99)
GLUCOSE BLD-MCNC: 122 MG/DL (ref 70–99)
GLUCOSE BLD-MCNC: 124 MG/DL (ref 70–99)
GLUCOSE BLD-MCNC: 219 MG/DL (ref 70–99)
HCT VFR BLD CALC: 36.8 % (ref 37–47)
HEMOGLOBIN: 11.6 GM/DL (ref 12.5–16)
IMMATURE NEUTROPHIL %: 0.3 % (ref 0–0.43)
LYMPHOCYTES ABSOLUTE: 2.1 K/CU MM
LYMPHOCYTES RELATIVE PERCENT: 21.7 % (ref 24–44)
Lab: ABNORMAL
MAGNESIUM: 2.2 MG/DL (ref 1.8–2.4)
MCH RBC QN AUTO: 27.6 PG (ref 27–31)
MCHC RBC AUTO-ENTMCNC: 31.5 % (ref 32–36)
MCV RBC AUTO: 87.4 FL (ref 78–100)
MONOCYTES ABSOLUTE: 0.9 K/CU MM
MONOCYTES RELATIVE PERCENT: 9.2 % (ref 0–4)
NUCLEATED RBC %: 0 %
PDW BLD-RTO: 13.9 % (ref 11.7–14.9)
PLATELET # BLD: 257 K/CU MM (ref 140–440)
PMV BLD AUTO: 10.9 FL (ref 7.5–11.1)
POTASSIUM SERPL-SCNC: 4.1 MMOL/L (ref 3.5–5.1)
RBC # BLD: 4.21 M/CU MM (ref 4.2–5.4)
SEGMENTED NEUTROPHILS ABSOLUTE COUNT: 6.5 K/CU MM
SEGMENTED NEUTROPHILS RELATIVE PERCENT: 68 % (ref 36–66)
SODIUM BLD-SCNC: 142 MMOL/L (ref 135–145)
SPECIMEN: ABNORMAL
TOTAL COLONY COUNT: ABNORMAL
TOTAL IMMATURE NEUTOROPHIL: 0.03 K/CU MM
TOTAL NUCLEATED RBC: 0 K/CU MM
WBC # BLD: 9.5 K/CU MM (ref 4–10.5)

## 2019-03-30 PROCEDURE — 94761 N-INVAS EAR/PLS OXIMETRY MLT: CPT

## 2019-03-30 PROCEDURE — 2580000003 HC RX 258: Performed by: FAMILY MEDICINE

## 2019-03-30 PROCEDURE — 36415 COLL VENOUS BLD VENIPUNCTURE: CPT

## 2019-03-30 PROCEDURE — 82962 GLUCOSE BLOOD TEST: CPT

## 2019-03-30 PROCEDURE — 80048 BASIC METABOLIC PNL TOTAL CA: CPT

## 2019-03-30 PROCEDURE — 6370000000 HC RX 637 (ALT 250 FOR IP): Performed by: FAMILY MEDICINE

## 2019-03-30 PROCEDURE — 85049 AUTOMATED PLATELET COUNT: CPT

## 2019-03-30 PROCEDURE — 6360000002 HC RX W HCPCS: Performed by: INTERNAL MEDICINE

## 2019-03-30 PROCEDURE — 2100000000 HC CCU R&B

## 2019-03-30 PROCEDURE — 6370000000 HC RX 637 (ALT 250 FOR IP): Performed by: INTERNAL MEDICINE

## 2019-03-30 PROCEDURE — 83735 ASSAY OF MAGNESIUM: CPT

## 2019-03-30 PROCEDURE — 85730 THROMBOPLASTIN TIME PARTIAL: CPT

## 2019-03-30 PROCEDURE — 85025 COMPLETE CBC W/AUTO DIFF WBC: CPT

## 2019-03-30 PROCEDURE — 97116 GAIT TRAINING THERAPY: CPT

## 2019-03-30 PROCEDURE — 85027 COMPLETE CBC AUTOMATED: CPT

## 2019-03-30 PROCEDURE — 2580000003 HC RX 258: Performed by: HOSPITALIST

## 2019-03-30 PROCEDURE — 6370000000 HC RX 637 (ALT 250 FOR IP): Performed by: SURGERY

## 2019-03-30 PROCEDURE — 99233 SBSQ HOSP IP/OBS HIGH 50: CPT | Performed by: INTERNAL MEDICINE

## 2019-03-30 PROCEDURE — 97162 PT EVAL MOD COMPLEX 30 MIN: CPT

## 2019-03-30 RX ORDER — CIPROFLOXACIN 500 MG/1
500 TABLET, FILM COATED ORAL EVERY 12 HOURS SCHEDULED
Status: DISCONTINUED | OUTPATIENT
Start: 2019-03-30 | End: 2019-03-30

## 2019-03-30 RX ORDER — 0.9 % SODIUM CHLORIDE 0.9 %
250 INTRAVENOUS SOLUTION INTRAVENOUS ONCE
Status: COMPLETED | OUTPATIENT
Start: 2019-03-30 | End: 2019-03-30

## 2019-03-30 RX ORDER — CIPROFLOXACIN 500 MG/1
500 TABLET, FILM COATED ORAL EVERY 12 HOURS SCHEDULED
Status: DISCONTINUED | OUTPATIENT
Start: 2019-03-30 | End: 2019-03-31 | Stop reason: HOSPADM

## 2019-03-30 RX ADMIN — CARBIDOPA AND LEVODOPA 1 TABLET: 25; 100 TABLET ORAL at 21:32

## 2019-03-30 RX ADMIN — DORZOLAMIDE HYDROCHLORIDE 1 DROP: 20 SOLUTION/ DROPS OPHTHALMIC at 21:32

## 2019-03-30 RX ADMIN — DORZOLAMIDE HYDROCHLORIDE 1 DROP: 20 SOLUTION/ DROPS OPHTHALMIC at 08:44

## 2019-03-30 RX ADMIN — TIMOLOL MALEATE 1 DROP: 5 SOLUTION OPHTHALMIC at 08:48

## 2019-03-30 RX ADMIN — CARVEDILOL 3.12 MG: 3.12 TABLET, FILM COATED ORAL at 18:22

## 2019-03-30 RX ADMIN — CARVEDILOL 3.12 MG: 3.12 TABLET, FILM COATED ORAL at 08:42

## 2019-03-30 RX ADMIN — ASPIRIN 81 MG: 81 TABLET, COATED ORAL at 08:42

## 2019-03-30 RX ADMIN — ATORVASTATIN CALCIUM 80 MG: 40 TABLET, FILM COATED ORAL at 21:32

## 2019-03-30 RX ADMIN — CARBIDOPA AND LEVODOPA 1 TABLET: 25; 100 TABLET ORAL at 13:35

## 2019-03-30 RX ADMIN — TIMOLOL MALEATE 1 DROP: 5 SOLUTION OPHTHALMIC at 21:32

## 2019-03-30 RX ADMIN — SODIUM CHLORIDE: 9 INJECTION, SOLUTION INTRAVENOUS at 13:35

## 2019-03-30 RX ADMIN — CARBIDOPA AND LEVODOPA 1 TABLET: 25; 100 TABLET ORAL at 08:43

## 2019-03-30 RX ADMIN — CLOPIDOGREL BISULFATE 75 MG: 75 TABLET ORAL at 08:42

## 2019-03-30 RX ADMIN — INSULIN LISPRO 1 UNITS: 100 INJECTION, SOLUTION INTRAVENOUS; SUBCUTANEOUS at 21:31

## 2019-03-30 RX ADMIN — HEPARIN SODIUM 1610 UNITS: 1000 INJECTION, SOLUTION INTRAVENOUS; SUBCUTANEOUS at 00:07

## 2019-03-30 RX ADMIN — SODIUM CHLORIDE 250 ML: 9 INJECTION, SOLUTION INTRAVENOUS at 04:48

## 2019-03-30 RX ADMIN — SODIUM CHLORIDE: 9 INJECTION, SOLUTION INTRAVENOUS at 23:20

## 2019-03-30 RX ADMIN — CIPROFLOXACIN HYDROCHLORIDE 500 MG: 500 TABLET, FILM COATED ORAL at 21:32

## 2019-03-30 ASSESSMENT — PAIN SCALES - GENERAL
PAINLEVEL_OUTOF10: 0

## 2019-03-30 NOTE — PROGRESS NOTES
Kimmie Snell Heading at bedside for daily rounds.  Orders received:    Stop Heparin gtt  Increase  ml/hr  DC planning: walker, shower chair

## 2019-03-30 NOTE — PROGRESS NOTES
Hospitalist Progress Note      Name:  Fabiola Miles /Age/Sex: 1932  (80 y.o. female)   MRN & CSN:  [id number] & [id number] Admission Date/Time: 3/28/2019  1:41 AM   Location:  -A PCP: Grzegorz Miles MD       Fabiola Miles is a 80 y.o.  female  who presents with No chief complaint on file.       Assessment and Plan:   NSTEMI  -  LHC completed 3/29/19 with 3 vessel disease noted, pt has refused CABG and any intervention, opted for med mx, d/w entire family at [facility]  - asa + plavix, coreg, statin  - echo: ef 50%, grade 3 D, mod TR  - cardio following    Hematuria  - likely from heparin gtt, stop today as pt has refused any interventions and plan is home with med mx  - increase IVF NS for today  - monitor for resolution, once improved/resolved okay to d/c pt home    UTI  - start oral cipro x 10 days  - urine cx+ e.coli  - stop rocephin (2 days done)                        Diet DIET CARDIAC; No Caffeine   Code Status Full Code     Medications:   Medications:    sodium chloride flush  10 mL Intravenous 2 times per day    sodium chloride flush  10 mL Intravenous 2 times per day    aspirin  81 mg Oral Daily    [START ON 3/31/2019] fleet  1 enema Rectal Once    dorzolamide  1 drop Both Eyes BID    And    timolol  1 drop Both Eyes BID    atorvastatin  80 mg Oral Nightly    clopidogrel  75 mg Oral Daily    carbidopa-levodopa  1 tablet Oral TID    insulin lispro  0-6 Units Subcutaneous TID WC    insulin lispro  0-3 Units Subcutaneous Nightly    cefTRIAXone (ROCEPHIN) IV  1 g Intravenous Q24H    carvedilol  3.125 mg Oral BID WC      Infusions:    sodium chloride 100 mL/hr at 19 1335    dextrose       PRN Meds:   sodium chloride flush 10 mL PRN   acetaminophen 650 mg Q4H PRN   sodium chloride flush 10 mL PRN   nitroGLYCERIN 0.4 mg Q5 Min PRN   glucose 15 g PRN   dextrose 12.5 g PRN   glucagon (rDNA) 1 mg PRN   dextrose 100 mL/hr PRN   magnesium hydroxide 30 mL Daily PRN   ondansetron 4 mg Q6H PRN     Subjective:   Pt states she has no CP and walked well this morning     Objective: Intake/Output Summary (Last 24 hours) at 3/30/2019 1410  Last data filed at 3/30/2019 1300  Gross per 24 hour   Intake 2681 ml   Output 600 ml   Net 2081 ml      Vitals:   Vitals:    03/30/19 1302   BP: (!) 92/39   Pulse: 63   Resp: 14   Temp:    SpO2: 100%     Physical Exam:   Gen:  awake, alert, cooperative, no apparent distress  Head/Eyes:  Normocephalic atraumatic, EOMI   NECK:   symmetrical, trachea midline  LUNGS: Normal Effort   CARDIOVASCULAR:  Normal rate  ABDOMEN: Non tender, non distended, no HSM noted. MUSCULOSKELETAL:  ROM limited  NEUROLOGIC: Alert and Oriented,  Cranial nerves II-XII are grossly intact.    SKIN:  no bruising or bleeding, normal skin color,  no redness      Data:       CBC   Recent Labs     03/28/19  0330 03/29/19  0333 03/30/19  0419   WBC 13.8* 8.4 9.5   HGB 12.6 12.2* 11.6*   HCT 40.1 37.9 36.8*    274 257      BMP Recent Labs     03/28/19  0330 03/29/19  0333 03/30/19  0419    140 142   K 4.1 4.5 4.1    108 110   CO2 25 24 25   BUN 19 17 23   CREATININE 0.7 0.5* 0.7         Electronically signed by Tobey Merlin, MD on 3/30/2019 at 2:10 PM

## 2019-03-30 NOTE — PROGRESS NOTES
Hospitalist Progress Note      Name:  Valorie Amaral /Age/Sex: 1932  (80 y.o. female)   MRN & CSN:  [id number] & [id number] Admission Date/Time: 3/28/2019  1:41 AM   Location:  -A PCP: Soy Silva MD         Hospital Day: 2    Assessment and Plan:   Valorie Amaral is a 80 y.o.  female  who presents     · Nstemi. C completed 3/29/19 with 3 vessel disease noted. · UTI  · DM2    - she is refusing cabg  - keep in cvicu per cardio request  - plan: medical management. Discussed stable angina. - dc in 1-2 days to home. Won't likely need HHC as she is very functional     Diet DIET CARDIAC; No Caffeine   DVT Prophylaxis [] Lovenox, []  Heparin, [] SCDs, []No VTE prophylaxis, patient ambulating   GI Prophylaxis [] PPI, [] H2 Blocker, [] No GI prophylaxis, patient is receiving diet/Tube Feeds   Code Status Full Code   Disposition Patient requires continued admission due to pending cardiac evaluation   MDM [] Low, [] Moderate,[]  High  Patient's risk as above due to acute illness with unknown etiology     History of Present Illness:     Pt S&E. No family at bedside. Denies cp now. Denies ha or sob. Talked in cvicu and she does not think at her age intervention is appropriate. 10-14 point ROS reviewed negative, unless as noted above    Objective: Intake/Output Summary (Last 24 hours) at 3/29/2019 2115  Last data filed at 3/29/2019 2013  Gross per 24 hour   Intake 1061 ml   Output 1425 ml   Net -364 ml      Vitals:   Vitals:    19   BP: (!) 112/48   Pulse: 77   Resp: 14   Temp: 98.2 °F (36.8 °C)   SpO2: 98%     Physical Exam:    GEN Awake female, laying in bed in no apparent distress. Appears given age. EYES Pupils are equally round. No scleral discharge  HENT Atraumatic and symmetric head  NECK No apparent thyromegaly  RESP Symmetric chest movement while on room air. CARDIO/VASC Peripheral pulses equal bilaterally and palpable. No peripheral edema. GI Abdomen is not distended. Rectal exam deferred.  Jang catheter is not present. HEME/LYMPH No petechiae or ecchymoses. MSK Spontaneous movement of BL upper extremities  SKIN Normal coloration, warm, dry. NEURO Cranial nerves appear grossly intact  PSYCH Awake, alert. Oriented x4.     Medications:   Medications:    sodium chloride flush  10 mL Intravenous 2 times per day    sodium chloride flush  10 mL Intravenous 2 times per day    aspirin  81 mg Oral Daily    [START ON 3/31/2019] fleet  1 enema Rectal Once    dorzolamide  1 drop Both Eyes BID    And    timolol  1 drop Both Eyes BID    atorvastatin  80 mg Oral Nightly    clopidogrel  75 mg Oral Daily    carbidopa-levodopa  1 tablet Oral TID    insulin lispro  0-6 Units Subcutaneous TID WC    insulin lispro  0-3 Units Subcutaneous Nightly    cefTRIAXone (ROCEPHIN) IV  1 g Intravenous Q24H    carvedilol  3.125 mg Oral BID WC      Infusions:    sodium chloride 75 mL/hr at 03/29/19 0748    sodium chloride      dextrose      heparin (porcine) 12 Units/kg/hr (03/29/19 4937)     PRN Meds:     sodium chloride flush 10 mL PRN   acetaminophen 650 mg Q4H PRN   sodium chloride flush 10 mL PRN   nitroGLYCERIN 0.4 mg Q5 Min PRN   glucose 15 g PRN   dextrose 12.5 g PRN   glucagon (rDNA) 1 mg PRN   dextrose 100 mL/hr PRN   magnesium hydroxide 30 mL Daily PRN   ondansetron 4 mg Q6H PRN   heparin (porcine) 60 Units/kg PRN   heparin (porcine) 30 Units/kg PRN         Electronically signed by Authur Ahumada, DO on 3/29/2019 at 9:15 PM

## 2019-03-30 NOTE — PROGRESS NOTES
Cardiology Progress Note     Admit Date:  3/28/2019    Consult reason/ Seen today for :   CAD  NSTEMI    Subjective and  Overnight Events :  She die snot want surgery or stents, hematuria resolved after stopping heparin gtt, seen by surgery      Chief complain on admission : 80 y. o.year old who is admitted forNo chief complaint on file. Assessment / Plan:  ASCVD: Continue aspirin and plavix for atleast one yr, continue statins, ACEinhibitors, betablockers  Can add imdur if she has angina   Follow up in office   DVT Prophylaxis if no contraindication    Past medical history:    has a past medical history of CAD (coronary artery disease), Coronary artery disease suspected. refused cardio, Diabetes mellitus ([address] Foot ulcer ([address] Glaucoma, Hyperlipidemia, Left breast mass, Malignant neoplasm of left breast ([address] Parkinson disease ([address] and Stenosis of left subclavian artery ([address] Past surgical history:   has a past surgical history that includes Appendectomy; Tonsillectomy; Breast lumpectomy (Left, 1995); and radha and bso (cervix removed) (Right). Social History:   reports that she has never smoked. She has never used smokeless tobacco. She reports that she drinks alcohol. She reports that she does not use drugs. Family history:  family history includes COPD in her father; Heart Disease in her mother.     Allergies   Allergen Reactions    Eggs Or Egg-Derived Products [Eggs Or Egg-Derived Products] Hives    Iodine      Iodine contrast       Review of Systems:    All 14 systems were reviewed and are negative  Except for the positive findings  which as documented     BP (!) 112/49   Pulse 68   Temp 97.3 °F (36.3 °C) (Oral)   Resp 17   Ht 5' 2\" (1.575 m)   Wt 126 lb 1.7 oz (57.2 kg)   SpO2 100%   BMI 23.06 kg/m²       Intake/Output Summary (Last 24 hours) at 3/30/2019 6920  Last data filed at 3/30/2019 1300  Gross per 24 hour Intake 2681 ml   Output 600 ml   Net 2081 ml     Physical Exam:  Constitutional:  Well developed, Well nourished, No acute distress, Non-toxic appearance. HENT:  Normocephalic, Atraumatic, Bilateral external ears normal, Oropharynx moist, No oral exudates, Nose normal. Neck- Normal range of motion, No tenderness, Supple, No stridor. Eyes:  PERRL, EOMI, Conjunctiva normal, No discharge. Respiratory:  Normal breath sounds, No respiratory distress, No wheezing, No chest tenderness. Cardiovascular:  Normal heart rate, Normal rhythm, No murmurs, No rubs, No gallops, JVP not elevated  Abdomen/GI:  Bowel sounds normal, Soft, No tenderness, No masses, No pulsatile masses. Musculoskeletal:  Intact distal pulses, No edema, No tenderness, No cyanosis, No clubbing. Good range of motion in all major joints. No tenderness to palpation or major deformities noted. Back- No tenderness. Integument:  Warm, Dry, No erythema, No rash. Lymphatic:  No lymphadenopathy noted. Neurologic:  Alert & oriented x 3, Normal motor function, Normal sensory function, No focal deficits noted.    Psychiatric:  Affect  and  Mood :no change    Medications:    ciprofloxacin  500 mg Oral 2 times per day    sodium chloride flush  10 mL Intravenous 2 times per day    sodium chloride flush  10 mL Intravenous 2 times per day    aspirin  81 mg Oral Daily    [START ON 3/31/2019] fleet  1 enema Rectal Once    dorzolamide  1 drop Both Eyes BID    And    timolol  1 drop Both Eyes BID    atorvastatin  80 mg Oral Nightly    clopidogrel  75 mg Oral Daily    carbidopa-levodopa  1 tablet Oral TID    insulin lispro  0-6 Units Subcutaneous TID     insulin lispro  0-3 Units Subcutaneous Nightly    carvedilol  3.125 mg Oral BID       sodium chloride 100 mL/hr at 03/30/19 1335    dextrose       sodium chloride flush, acetaminophen, sodium chloride flush, nitroGLYCERIN, glucose, dextrose, glucagon (rDNA), dextrose, magnesium hydroxide, ondansetron    Lab Data:  CBC:   Recent Labs     03/28/19  0330 03/29/19  0333 03/30/19  0419   WBC 13.8* 8.4 9.5   HGB 12.6 12.2* 11.6*   HCT 40.1 37.9 36.8*   MCV 87.4 85.7 87.4    274 257     BMP:   Recent Labs     03/28/19  0330 03/29/19  0333 03/30/19  0419    140 142   K 4.1 4.5 4.1    108 110   CO2 25 24 25   BUN 19 17 23   CREATININE 0.7 0.5* 0.7     PT/INR: No results for input(s): PROTIME, INR in the last 72 hours. BNP:  No results for input(s): PROBNP in the last 72 hours. TROPONIN:   Recent Labs     03/27/19 2215 03/28/19 0330 03/28/19  0945   TROPONINT TROP 0.308 CALLED AND RB TO [address] [facility] ED [id number] 0.278* 0.314*        ECHO :   echocardiogram    Assessment:  80 y. o.year old who is admitted forNo chief complaint on file. , active issues as noted below:  Impression:  Principal Problem:    NSTEMI (non-ST elevated myocardial infarction) [facility])  Active Problems:    Coronary artery disease suspected. refused cardio    Mixed hyperlipidemia    Stenosis of left subclavian artery    Parkinson disease (HCC)    Diabetes mellitus type 2 in nonobese (HCC)    Malignant neoplasm of left breast (HCC)    Glaucoma of both eyes    UTI (urinary tract infection)  Resolved Problems:    * No resolved hospital problems. *            All labs, medications and tests reviewed by myself , continue all other medications of all above medical condition listed as is except for changes mentioned above. Thank you very much for consult , please call with questions.     Shonna Stroud MD 3/30/2019 2:54 PM

## 2019-03-30 NOTE — CONSULTS
[id number] [address] [employer], 11/26/1932, 2002/2002-A, 3/30/2019    Discharge Recommendation: Home with [place] [facility] S[facility].     Equipment: pt will benefit from RW at d/c. She is at increased risk for falls with lesser device. History  [place]:  There were no encounter diagnoses. Subjective:  Patient states:  Agreeable to therapy eval. Says she feels weaker, unsteady--attributes to decreased activity level . Pain:  Denies . 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 active in the community. no other recent falls    Examination of body systems (includes body structures/functions, activity/participation limitations):  · Observation:  Supine in bed upon arrival; red urine in cath  · [facility]:  [facility]  · [facility]  · Cardiopulmonary:  Vitals stable; SBP 92  · Cognition: WFL, see OT/SLP note for further evaluation. Body Structures/Function  · ROM R/L:  WFL. · Strength R/L:  5/5, mild strength deficits observed in function. · Neuro:  [place]      Mobility:  · Supine to sit:  Not observed; anticipate CGA  · Transfers: CGA--multiple attempts from [facility], does not use UE pushoff. Fair immediate standing balance and posture  · Sitting balance:  ind .    · Standing balance:  SBA static balance, min A dynamic balance-- occasional LOB with delayed/insufficient righting reactions. 40    Electronically signed by:    Salina Martinez, PT  3/30/2019, 10:58 AM

## 2019-03-30 NOTE — FLOWSHEET NOTE
Sent message to hospitalist about the Pt's low urinary output. She has only had 50cc of urine since midnight. It is now a cherry color as well.  Waiting on orders from hospitalist.

## 2019-03-31 VITALS
OXYGEN SATURATION: 99 % | WEIGHT: 132.94 LBS | HEART RATE: 76 BPM | HEIGHT: 62 IN | TEMPERATURE: 97.8 F | SYSTOLIC BLOOD PRESSURE: 143 MMHG | RESPIRATION RATE: 12 BRPM | DIASTOLIC BLOOD PRESSURE: 71 MMHG | BODY MASS INDEX: 24.46 KG/M2

## 2019-03-31 LAB
ANION GAP SERPL CALCULATED.3IONS-SCNC: 7 MMOL/L (ref 4–16)
BUN BLDV-MCNC: 19 MG/DL (ref 6–23)
CALCIUM SERPL-MCNC: 7.7 MG/DL (ref 8.3–10.6)
CHLORIDE BLD-SCNC: 109 MMOL/L (ref 99–110)
CO2: 25 MMOL/L (ref 21–32)
CREAT SERPL-MCNC: 0.7 MG/DL (ref 0.6–1.1)
GFR AFRICAN AMERICAN: >60 ML/MIN/1.73M2
GFR NON-AFRICAN AMERICAN: >60 ML/MIN/1.73M2
GLUCOSE BLD-MCNC: 110 MG/DL (ref 70–99)
GLUCOSE BLD-MCNC: 114 MG/DL (ref 70–99)
GLUCOSE BLD-MCNC: 138 MG/DL (ref 70–99)
HCT VFR BLD CALC: 36.6 % (ref 37–47)
HEMOGLOBIN: 11 GM/DL (ref 12.5–16)
MCH RBC QN AUTO: 27.1 PG (ref 27–31)
MCHC RBC AUTO-ENTMCNC: 30.1 % (ref 32–36)
MCV RBC AUTO: 90.1 FL (ref 78–100)
PDW BLD-RTO: 13.9 % (ref 11.7–14.9)
PLATELET # BLD: 215 K/CU MM (ref 140–440)
PMV BLD AUTO: 11 FL (ref 7.5–11.1)
POTASSIUM SERPL-SCNC: 4.3 MMOL/L (ref 3.5–5.1)
RBC # BLD: 4.06 M/CU MM (ref 4.2–5.4)
SODIUM BLD-SCNC: 141 MMOL/L (ref 135–145)
WBC # BLD: 6.5 K/CU MM (ref 4–10.5)

## 2019-03-31 PROCEDURE — 94761 N-INVAS EAR/PLS OXIMETRY MLT: CPT

## 2019-03-31 PROCEDURE — 6370000000 HC RX 637 (ALT 250 FOR IP): Performed by: STUDENT IN AN ORGANIZED HEALTH CARE EDUCATION/TRAINING PROGRAM

## 2019-03-31 PROCEDURE — 6370000000 HC RX 637 (ALT 250 FOR IP): Performed by: SURGERY

## 2019-03-31 PROCEDURE — 2580000003 HC RX 258: Performed by: SURGERY

## 2019-03-31 PROCEDURE — 6370000000 HC RX 637 (ALT 250 FOR IP): Performed by: INTERNAL MEDICINE

## 2019-03-31 PROCEDURE — 80048 BASIC METABOLIC PNL TOTAL CA: CPT

## 2019-03-31 PROCEDURE — 36415 COLL VENOUS BLD VENIPUNCTURE: CPT

## 2019-03-31 PROCEDURE — 85027 COMPLETE CBC AUTOMATED: CPT

## 2019-03-31 PROCEDURE — 6370000000 HC RX 637 (ALT 250 FOR IP): Performed by: FAMILY MEDICINE

## 2019-03-31 PROCEDURE — 82962 GLUCOSE BLOOD TEST: CPT

## 2019-03-31 RX ORDER — CIPROFLOXACIN 500 MG/1
500 TABLET, FILM COATED ORAL EVERY 12 HOURS SCHEDULED
Qty: 10 TABLET | Refills: 0 | Status: CANCELLED | OUTPATIENT
Start: 2019-03-31 | End: 2019-04-05

## 2019-03-31 RX ORDER — CARVEDILOL 3.12 MG/1
3.12 TABLET ORAL 2 TIMES DAILY WITH MEALS
Qty: 60 TABLET | Refills: 0 | Status: SHIPPED | OUTPATIENT
Start: 2019-03-31 | End: 2019-04-25 | Stop reason: SDUPTHER

## 2019-03-31 RX ORDER — CLOPIDOGREL BISULFATE 75 MG/1
75 TABLET ORAL DAILY
Qty: 30 TABLET | Refills: 0 | Status: SHIPPED | OUTPATIENT
Start: 2019-04-01 | End: 2019-05-01 | Stop reason: SDUPTHER

## 2019-03-31 RX ORDER — LISINOPRIL 5 MG/1
5 TABLET ORAL DAILY
Qty: 30 TABLET | Refills: 0 | Status: SHIPPED | OUTPATIENT
Start: 2019-03-31 | End: 2019-04-25 | Stop reason: SDUPTHER

## 2019-03-31 RX ORDER — ATORVASTATIN CALCIUM 80 MG/1
80 TABLET, FILM COATED ORAL NIGHTLY
Qty: 30 TABLET | Refills: 0 | Status: SHIPPED | OUTPATIENT
Start: 2019-03-31 | End: 2019-04-25 | Stop reason: SDUPTHER

## 2019-03-31 RX ORDER — CIPROFLOXACIN 500 MG/1
500 TABLET, FILM COATED ORAL EVERY 12 HOURS SCHEDULED
Qty: 10 TABLET | Refills: 0 | Status: SHIPPED | OUTPATIENT
Start: 2019-03-31 | End: 2019-03-31 | Stop reason: HOSPADM

## 2019-03-31 RX ORDER — NITROGLYCERIN 0.4 MG/1
TABLET SUBLINGUAL
Qty: 25 TABLET | Refills: 0 | Status: SHIPPED | OUTPATIENT
Start: 2019-03-31 | End: 2021-03-08 | Stop reason: SDUPTHER

## 2019-03-31 RX ORDER — CLOPIDOGREL BISULFATE 75 MG/1
75 TABLET ORAL DAILY
Qty: 30 TABLET | Refills: 0 | Status: CANCELLED | OUTPATIENT
Start: 2019-04-01

## 2019-03-31 RX ORDER — LISINOPRIL 5 MG/1
5 TABLET ORAL DAILY
Status: DISCONTINUED | OUTPATIENT
Start: 2019-03-31 | End: 2019-03-31 | Stop reason: HOSPADM

## 2019-03-31 RX ORDER — CARVEDILOL 3.12 MG/1
3.12 TABLET ORAL 2 TIMES DAILY WITH MEALS
Qty: 60 TABLET | Refills: 0 | Status: CANCELLED | OUTPATIENT
Start: 2019-03-31

## 2019-03-31 RX ORDER — CEPHALEXIN 500 MG/1
500 CAPSULE ORAL 2 TIMES DAILY
Qty: 10 CAPSULE | Refills: 0 | Status: SHIPPED | OUTPATIENT
Start: 2019-03-31 | End: 2019-04-05 | Stop reason: ALTCHOICE

## 2019-03-31 RX ORDER — ASPIRIN 81 MG/1
81 TABLET ORAL DAILY
Qty: 30 TABLET | Refills: 3 | Status: CANCELLED | OUTPATIENT
Start: 2019-04-01

## 2019-03-31 RX ORDER — ATORVASTATIN CALCIUM 80 MG/1
80 TABLET, FILM COATED ORAL NIGHTLY
Qty: 30 TABLET | Refills: 0 | Status: CANCELLED | OUTPATIENT
Start: 2019-03-31

## 2019-03-31 RX ADMIN — CARVEDILOL 3.12 MG: 3.12 TABLET, FILM COATED ORAL at 08:12

## 2019-03-31 RX ADMIN — CARBIDOPA AND LEVODOPA 1 TABLET: 25; 100 TABLET ORAL at 14:06

## 2019-03-31 RX ADMIN — LISINOPRIL 5 MG: 5 TABLET ORAL at 14:06

## 2019-03-31 RX ADMIN — DORZOLAMIDE HYDROCHLORIDE 1 DROP: 20 SOLUTION/ DROPS OPHTHALMIC at 08:13

## 2019-03-31 RX ADMIN — CARBIDOPA AND LEVODOPA 1 TABLET: 25; 100 TABLET ORAL at 08:12

## 2019-03-31 RX ADMIN — ASPIRIN 81 MG: 81 TABLET, COATED ORAL at 08:12

## 2019-03-31 RX ADMIN — TIMOLOL MALEATE 1 DROP: 5 SOLUTION OPHTHALMIC at 08:13

## 2019-03-31 RX ADMIN — SODIUM CHLORIDE, PRESERVATIVE FREE 10 ML: 5 INJECTION INTRAVENOUS at 08:12

## 2019-03-31 RX ADMIN — CLOPIDOGREL BISULFATE 75 MG: 75 TABLET ORAL at 08:12

## 2019-03-31 RX ADMIN — CIPROFLOXACIN HYDROCHLORIDE 500 MG: 500 TABLET, FILM COATED ORAL at 08:12

## 2019-03-31 ASSESSMENT — PAIN SCALES - GENERAL
PAINLEVEL_OUTOF10: 0

## 2019-03-31 NOTE — PLAN OF CARE
Problem: Falls - Risk of:  Goal: Will remain free from falls  Description  Will remain free from falls  Outcome: Ongoing  Goal: Absence of physical injury  Description  Absence of physical injury  Outcome: Ongoing     Problem: Infection:  Goal: Will remain free from infection  Description  Will remain free from infection  Outcome: Ongoing     Problem: Safety:  Goal: Free from accidental physical injury  Description  Free from accidental physical injury  Outcome: Ongoing  Goal: Free from intentional harm  Description  Free from intentional harm  Outcome: Ongoing     Problem: Daily Care:  Goal: Daily care needs are met  Description  Daily care needs are met  Outcome: Ongoing     Problem: Pain:  Goal: Patient's pain/discomfort is manageable  Description  Patient's pain/discomfort is manageable  Outcome: Ongoing     Problem: Skin Integrity:  Goal: Skin integrity will stabilize  Description  Skin integrity will stabilize  Outcome: Ongoing     Problem: ABCDS Injury Assessment  Goal: Absence of physical injury  Outcome: Ongoing

## 2019-03-31 NOTE — CARE COORDINATION
.CM met with pt for d/c planning. Introduced self and updated white board. Pt lives alone and is independent with ADL's. Pt drives, has a PCP, has insurance, and is able to afford medication. Pt has a cane. [facility] offered and pt refused. Pt's family is getting her a lifeAlert. Pt denies any needs at this time. D/c plan is home alone, no needs. CM will continue to follow.   TE
Genevieve Zepeda requires the assistance of a wheeled walker with seat to successfully complete daily living tasks such as: bathing, toileting, dressing and grooming. A wheeled walker with seat is necessary due to the patient's unsteady gait, upper body weakness, inability to  and ambulation device, ambulating only short distances by pushing a walker, and the need to sit for a short time before resuming ambulation. These tasks cannot be completed with a lesser ambulation device such as a cane, crutch, or standard walker.
apartment. Adult children live out of town/out of state; relies on neighbors/friends for needed assistance. Independent w/ adls, drives, empolyed. DME- cane (not currently using). Follows w/ Dr Jasper Pryor, has transportation. [facility] [place] [facility] and can afford rx copays. Discussed potential d/c options. Declines need for HHC/SNF. Plans to return home independently; family obtaining [address] for Patient. Agreeable to continued CT follow-up. T/C Dr Jignesh Rodriguez office; Scheduled hospital f/u appt 4/5 9:45 am.  Future Appointments   Date Time Provider Iman Sow   4/5/2019  9:45 AM Jasper Pryor MD [facility]   5/6/2019  9:30 AM Jasper Pryor MD [address]       Health Maintenance  There are no preventive care reminders to display for this patient.     Yuri iVlla RN

## 2019-03-31 NOTE — DISCHARGE SUMMARY
Discharge Summary    Name:  Andi Monk /Age/Sex: 1932  (80 y.o. female)   MRN & CSN:  [id number] & [id number] Admission Date/Time: 3/28/2019  1:41 AM   Attending:  Megha Vargas DO Discharging Physician: Megha Vargas DO     HPI and Hospital Course:   Andi Monk is a 80 y.o.  female  who presents with:    · Nstemi. [facility] completed 3/29/19 with 3 vessel disease note. Pt has refused CABG and any intervention, opted for med mx. · UTI, +Ecoli. cipro x10 days   · HLD, HTN    The patient expressed appropriate understanding of and agreement with the discharge recommendations, medications, and plan. Consults this admission:  IP CONSULT TO CARDIOLOGY  IP CONSULT TO CARDIAC REHAB  IP CONSULT TO CASE MANAGEMENT  IP CONSULT TO CARDIAC REHAB    Discharge Instruction:   Follow up appointments: cardiology   Primary care physician:  within 2 weeks    Diet:  General/cardiac/ADA/as tolerated  Activity: {discharge activity: as tolerated  Disposition: Discharged to:   [x]Home, [][facility], []SNF, []Acute Rehab, []Hospice   Condition on discharge: Stable    Discharge Medications:      a    Home Medication Instructions ALQ:[id number]    Printed on:19 3899   Medication Information                      aspirin 81 MG tablet  Take 81 mg by mouth daily.              atorvastatin (LIPITOR) 80 MG tablet  Take 1 tablet by mouth nightly             carbidopa-levodopa (SINEMET)  MG per tablet  Take 1 tablet by mouth 3 times daily              carvedilol (COREG) 3.125 MG tablet  Take 1 tablet by mouth 2 times daily (with meals)             cephALEXin (KEFLEX) 500 MG capsule  Take 1 capsule by mouth 2 times daily for 5 days             clopidogrel (PLAVIX) 75 MG tablet  Take 1 tablet by mouth daily             dorzolamide-timolol (COSOPT) 22.3-6.8 MG/ML ophthalmic solution  Place 1 drop into both eyes 2 times daily             fluticasone (FLONASE) 50 MCG/ACT nasal spray  1 spray by Nasal route daily             glipiZIDE (GLUCOTROL) 5 MG tablet  Take 1 tablet by mouth daily             lisinopril (PRINIVIL;ZESTRIL) 5 MG tablet  Take 1 tablet by mouth daily             nitroGLYCERIN (NITROSTAT) 0.4 MG SL tablet  up to max of 3 total doses. If no relief after 1 dose, call 911. Objective Findings at Discharge:   BP (!) 133/52   Pulse 74   Temp 97.6 °F (36.4 °C) (Oral)   Resp 17   Ht 5' 2\" (1.575 m)   Wt 132 lb 15 oz (60.3 kg)   SpO2 100%   BMI 24.31 kg/m²            PHYSICAL EXAM   GEN Awake female, laying in bed in no apparent distress. Appears given age. EYES Pupils are equally round. No scleral discharge  HENT Atraumatic and symmetric head  NECK No apparent thyromegaly  RESP Symmetric chest movement while on room air. CARDIO/VASC Peripheral pulses equal bilaterally and palpable. No peripheral edema. GI Abdomen is not distended. Rectal exam deferred.  Ajng catheter is not present. HEME/LYMPH No petechiae or ecchymoses. MSK Spontaneous movement of BL upper extremities  SKIN Normal coloration, warm, dry. NEURO Cranial nerves appear grossly intact  PSYCH Awake, alert.     BMP/CBC  Recent Labs     03/29/19  0333 03/30/19  0419 03/31/19  0336    142 141   K 4.5 4.1 4.3    110 109   CO2 24 25 25   BUN 17 23 19   CREATININE 0.5* 0.7 0.7   WBC 8.4 9.5 6.5   HCT 37.9 36.8* 36.6*    257 215     SIGNIFICANT IMAGING AND LABS:  none    Discharge Time of 34 minutes    Electronically signed by Torres Robins DO on 3/31/2019 at 10:02 AM

## 2019-03-31 NOTE — PROGRESS NOTES
Son at bedside, given script for walker at this time and told where can get walker. Verbalizes understanding, states his sister will be staying with patient at discharge. Dr. Beatriz Keating at bedside and reviewed progress, orders to remove catheter, let patient void, and can be discharged. Updated pt and family and agreeable to current plan of care. Will continue to monitor closely.

## 2019-04-01 ENCOUNTER — CARE COORDINATION (OUTPATIENT)
Dept: CASE MANAGEMENT | Age: 84
End: 2019-04-01

## 2019-04-01 DIAGNOSIS — I25.10 CORONARY ARTERY DISEASE INVOLVING NATIVE CORONARY ARTERY OF NATIVE HEART WITHOUT ANGINA PECTORIS: Primary | ICD-10-CM

## 2019-04-01 LAB
CULTURE: ABNORMAL
CULTURE: ABNORMAL
Lab: ABNORMAL
SPECIMEN: ABNORMAL

## 2019-04-01 PROCEDURE — 1111F DSCHRG MED/CURRENT MED MERGE: CPT | Performed by: INTERNAL MEDICINE

## 2019-04-01 NOTE — CARE COORDINATION
Ste[facility] Transitions Initial Follow Up Call    Call within 2 business days of discharge: Yes    Patient: Robyn Menard Patient : 1932   MRN: [id number]  Reason for Admission:   Discharge Date: 3/31/19 RARS: Readmission Risk Score: 12       Spoke with:   Patient's daughter    Facility: [facility]    Non-face-to-face services provided:  Assessment and support for treatment adherence and medication management-[id number]    Care Transitions 24 Hour Call    Do you have support at home?:  Alone  Are you an active caregiver in your home?:  No  Care Transitions Interventions         Follow Up:  Spoke with patient's daughter. Reports that patient is feeling well. Patient slept well thru the night and ate a \"good breakfast\". Patient 's daughter reports that patient is ambulating in her home without issue. Patients' daughter reports that she will be staying with patient for a few weeks to provide 24/ support. Reviewed discharge instructions and medications. Medication reconciliation completed. Patient 's daughter reports that patient was taking Cipro  mg q 12 hours prior to hospital stay. Noted that this medication was not included in d/c summary. Inquired as to whether patient should continue with this medication. Informed of CTC plan to follow up with PCP to confirm. Reports that patient symptoms are well managed at this time and that patient care needs are being met. Denies the need for N[address]. Instructed patient caregiver to report any change in patient condition or worsening symptoms to PCP. Patient has a follow up appointment scheduled this week and also follows with Cardiology. No further questions, equipment or resource needs voiced. Agreeable to continued Care Transition. CTC contact information confirmed. Encouraged call back if needs arise. T.C. To PCP's office. Left detailed message with request for medication clarification. CTC call back given with request for call back. Call back received from PCP. Instructions left for patient to continue with one of the antibiotics as prescribed; not both. Will update patient. T.C. To patient's daughter. Instructed as per PCP direction above. Patient's daughter will continue with Keflex 500 mg BID  For 5 days and will discontinue Cipro as instructed. Confirmed that patient will follow up with PCP 4/5. No further questions voiced.        Future Appointments   Date Time Provider Iman Sow   4/5/2019  9:45 AM Pankaj Gilliam MD [facility]   5/6/2019  9:30 AM Pankaj Gilliam MD [facility]       Epi Rasmussen RN

## 2019-04-02 ENCOUNTER — TELEPHONE (OUTPATIENT)
Dept: INTERNAL MEDICINE CLINIC | Age: 84
End: 2019-04-02

## 2019-04-02 LAB
CULTURE: NORMAL
Lab: NORMAL
SPECIMEN: NORMAL

## 2019-04-04 ENCOUNTER — CARE COORDINATION (OUTPATIENT)
Dept: CASE MANAGEMENT | Age: 84
End: 2019-04-04

## 2019-04-04 ENCOUNTER — OFFICE VISIT (OUTPATIENT)
Dept: CARDIOLOGY CLINIC | Age: 84
End: 2019-04-04
Payer: MEDICARE

## 2019-04-04 VITALS
WEIGHT: 120 LBS | SYSTOLIC BLOOD PRESSURE: 118 MMHG | BODY MASS INDEX: 22.08 KG/M2 | DIASTOLIC BLOOD PRESSURE: 66 MMHG | HEIGHT: 62 IN | HEART RATE: 64 BPM

## 2019-04-04 DIAGNOSIS — I25.10 CORONARY ARTERY DISEASE INVOLVING NATIVE CORONARY ARTERY OF NATIVE HEART WITHOUT ANGINA PECTORIS: Primary | ICD-10-CM

## 2019-04-04 DIAGNOSIS — E78.2 MIXED HYPERLIPIDEMIA: ICD-10-CM

## 2019-04-04 PROCEDURE — G8420 CALC BMI NORM PARAMETERS: HCPCS | Performed by: NURSE PRACTITIONER

## 2019-04-04 PROCEDURE — 1036F TOBACCO NON-USER: CPT | Performed by: NURSE PRACTITIONER

## 2019-04-04 PROCEDURE — 1090F PRES/ABSN URINE INCON ASSESS: CPT | Performed by: NURSE PRACTITIONER

## 2019-04-04 PROCEDURE — G8598 ASA/ANTIPLAT THER USED: HCPCS | Performed by: NURSE PRACTITIONER

## 2019-04-04 PROCEDURE — G8427 DOCREV CUR MEDS BY ELIG CLIN: HCPCS | Performed by: NURSE PRACTITIONER

## 2019-04-04 PROCEDURE — 4040F PNEUMOC VAC/ADMIN/RCVD: CPT | Performed by: NURSE PRACTITIONER

## 2019-04-04 PROCEDURE — 1123F ACP DISCUSS/DSCN MKR DOCD: CPT | Performed by: NURSE PRACTITIONER

## 2019-04-04 PROCEDURE — 1111F DSCHRG MED/CURRENT MED MERGE: CPT | Performed by: NURSE PRACTITIONER

## 2019-04-04 PROCEDURE — 99213 OFFICE O/P EST LOW 20 MIN: CPT | Performed by: NURSE PRACTITIONER

## 2019-04-04 NOTE — CARE COORDINATION
[facility] Transitions Follow Up Call    2019    Patient: No York  Patient : 1932   MRN: [id number]  Reason for Admission: NSTEMI; UTI; Hematuria  Discharge Date: 3/31/19 RARS: Readmission Risk Score: 12    Spoke with: Patient's Dtr    Care Transitions Subsequent and Final Call    Schedule Follow Up Appointment with PCP:  Declined  Subsequent and Final Calls  Do you have any ongoing symptoms?:  No  Have your medications changed?:  No  Do you have any questions related to your medications?:  No  Do you currently have any active services?:  No  Do you have any needs or concerns that I can assist you with?:  No  Identified Barriers:  None  Care Transitions Interventions  No Identified Needs  Other Interventions: Follow Up; Received call from Dtr Naima Ying. Reports Patient continues to do well. No cp, sob, dizziness. No hematoma or edema at cath incision site; area soft. Voiding adequately, clear urine, no dysuria or hematuria. Reports ambulating well w/ cane or walker. Dtr obtaining Life Alert for Patient. Dtr to stay w/ Patient thru end of month. Requests CTC continue to check in weekly in case there is need for [facility] referral.    Confirmed f/u appt  9:45am w/ Dr Clarence Little; Dtr to provide transportation. Denies resource/hhc needs at this time. Has CTC contact number.    Future Appointments   Date Time Provider Iman Sow   2019  9:45 AM Margareth Wright MD [facility]   2019  9:30 AM Margareth Wright MD [facility]   2019 11:00 AM Ralph Crooks APRN - CNP [facility]       Valentina Delarosa RN

## 2019-04-04 NOTE — PROGRESS NOTES
[facility]IL[facility] [place]  [address]  Phone: [phone]    Fax [phone]                  Jack Paz MD, Alex Tenorio MD, [facility], MD, MD Jamia Baltazar MD Custer Ginger, MD  Mica Mosquera, [address] [place] [address], [facility]      4/4/2019    RE: Andi Monk  (11/26/1932)                               TO:  Dr. Kevyn Peaec MD  The primary cardiologist is Dr. Raulito Guzman     CC: CAD HLP left subclavian artery stenosis    HPI:    Thank you for involving me in taking care of your patient Andi Monk. She is a 80y.o. year old female with a history of CAD HLP  Left subclavian artery stenosis and is being seen in the office today as follow up on their recent [facility] visit . She reports that she is doing well. There is no chest pain or SOB. She denies any palpitations or dizziness. She did have a mechanical fall last week when her boot slipped on the stairs. She had a Left heat cath that showed TVD- she declined intervention at that time. Vitals:    04/04/19 1108   BP: 118/66   Pulse: 64       Current Outpatient Medications   Medication Sig Dispense Refill    nitroGLYCERIN (NITROSTAT) 0.4 MG SL tablet up to max of 3 total doses.  If no relief after 1 dose, call 911. 25 tablet 0    atorvastatin (LIPITOR) 80 MG tablet Take 1 tablet by mouth nightly 30 tablet 0    lisinopril (PRINIVIL;ZESTRIL) 5 MG tablet Take 1 tablet by mouth daily 30 tablet 0    carvedilol (COREG) 3.125 MG tablet Take 1 tablet by mouth 2 times daily (with meals) 60 tablet 0    clopidogrel (PLAVIX) 75 MG tablet Take 1 tablet by mouth daily 30 tablet 0    cephALEXin (KEFLEX) 500 MG capsule Take 1 capsule by mouth 2 times daily for 5 days 10 capsule 0    glipiZIDE (GLUCOTROL) 5 MG tablet Take 1 tablet by mouth daily 30 tablet 5    fluticasone (FLONASE) 50 MCG/ACT nasal spray 1 spray by Nasal route daily 1 Bottle 0    dorzolamide-timolol (COSOPT) 22.3-6.8 MG/ML ophthalmic solution Place 1 drop into both eyes 2 times daily  3    carbidopa-levodopa (SINEMET)  MG per tablet Take 1 tablet by mouth 3 times daily  90 tablet     aspirin 81 MG tablet Take 81 mg by mouth daily. No current facility-administered medications for this visit. Allergies: Eggs or egg-derived products [eggs or egg-derived products] and Iodine  Past Medical History:   Diagnosis Date    CAD (coronary artery disease)     suspected, refused w/u    Coronary artery disease suspected. refused cardio     Suspected. Refused to see cardio.  Diabetes mellitus ([facility] [address] since 4/08    Foot ulcer ([address] left     resolved    Glaucoma     Dr. Ed Chong    Hyperlipidemia     Left breast mass 3/22/2017    Pt seen Dr Katharine Cooper for that. Mammo. Neg. She thinks it scar tissue.  Malignant neoplasm of left breast ([address] 8/31/2015    Left breast, Radiation therapy, lumpectomy 1985    Parkinson disease [facility]) 2008    dr. Relda Felty following    Stenosis of left subclavian artery [facility])     refused w/u     Past Surgical History:   Procedure Laterality Date    APPENDECTOMY      BREAST LUMPECTOMY Left 1995    MARY AND BSO Right     TONSILLECTOMY       Family History   Problem Relation Age of Onset    Heart Disease Mother     COPD Father      Social History     Tobacco Use    Smoking status: Never Smoker    Smokeless tobacco: Never Used   Substance Use Topics    Alcohol use: Yes     Alcohol/week: 0.0 oz        Review of Systems:   · Constitutional: No Fever,no unintentional weight Loss   · Eyes: No change in Vision:     · ENT: No Headaches, Hearing Loss or Vertigo. No tinnitus   · Cardiovascular: as per note above   · Respiratory: No cough or wheezing and as per note above.    · Gastrointestinal: no abdominal pain, no appetite loss, no blood in stools, constipation, or diarrhea, No heartburn  · Genitourinary: No dysuria, trouble voiding, or hematuria  · Musculoskeletal: decreasedl strength to legs and arms Yes,  back pain- No: myalgia No,  Arthralgia- No  · Integumentary: No rash or pruritis  Abrasion to chin  · Neurological: No TIA or stroke symptoms  · Psychiatric: Anxiety- No: depression-  No   · Endocrine: No malaise-No, fatigue No,- no temperature intolerance  · Hematologic/Lymphatic: No bleeding problems, blood clots or swollen lymph nodes  · Allergic/Immunologic: No nasal congestion or hives    Objective:      Physical Exam:  /66 (Site: Left Upper Arm, Position: Sitting, Cuff Size: Medium Adult)   Pulse 64   Ht 5' 2\" (1.575 m)   Wt 120 lb (54.4 kg)   BMI 21.95 kg/m²   Wt Readings from Last 3 Encounters:   04/04/19 120 lb (54.4 kg)   03/31/19 132 lb 15 oz (60.3 kg)   03/27/19 124 lb (56.2 kg)     Body mass index is 21.95 kg/m². GENERAL - Alert, oriented, pleasant, in no apparent distress. Head unremarkable  Eyes - pupils equal and reactive to light - bilateral conjunctiva are pink: sclera are white   ENT - external ears intact, nose is intact:  tongue is midline pink and moist  Neck - Supple. No jugular venous distention noted. No carotid bruits appreciated. Cardiovascular - Normal S1 and S2:  murmur appreciated No, No gallop. Regular rate- Yes,  rhythm regular-Yes. Extremities - No cyanosis, clubbing, no edema to lower legs. Pulmonary - No respiratory distress. No wheezes or rales. Chest is clear  Pulses: Bilateral radial and pedal pulses normal  Abdomen -  Soft no tenderness, non distended   Musculoskeletal - Normal movement of all extremities   Neurologic - alert and oriented: There are no gross focal neurologic abnormalities.    Skin-  No rash: no echymosis   Affect- normal mood and pleasant     DATA:  Lab Results   Component Value Date    CKTOTAL 874 03/27/2019     BNP:  No results found for: BNP  PT/INR:  No results found for: PTINR  Lab Results   Component Value Date    LABA1C 6.8 (H) 03/29/2019    LABA1C 6.7 (H) 03/28/2019     Lab Results   Component Value Date    CHOL 161 03/17/2017    TRIG 71 03/17/2017    HDL 53 10/12/2018    LDLDIRECT 117 (H) 10/12/2018     Lab Results   Component Value Date    ALT 14 03/27/2019    AST 25 03/27/2019     TSH:  No results found for: TSH      Assessment/ Plan:    Patient seen, interviewed and examined. Testing was reviewed. CAD    H/o: NSTEMI  Declined PCTA or CABG  Stable   Current medicatons include: coreg asa Plavix lipitor  She is to continue with current medications   Testing ordered:  no      Hyperlipidemia  At or near goal No  Current medications include: lipitor  She does not want to change medications at this time  She is to continue current medications      Let subclavian stenosis  Declined intervention      Lifestyle and risk factor modificatons discussed. Various goals are discussed and questions answered. Continue current medications. Appropriate prescriptions are addressed. Questions answered and patient verbalizes understanding. Call for any problems, questions, or concerns.

## 2019-04-04 NOTE — CARE COORDINATION
[facility] Transitions Follow Up Call    2019    Patient: Bebeto Caban  Patient : 1932   MRN: [id number]  Reason for Admission: NSTEMI; UTI  Discharge Date: 3/31/19 RARS: Readmission Risk Score: 16      Care Transitions Subsequent and Final Call    Subsequent and Final Calls  Care Transitions Interventions  Other Interventions: Follow Up: Attempted to reach patient for Care Transition, no answer. Voicemail message left with CTC contact information and request for call back.    Future Appointments   Date Time Provider Iman Sow   2019  9:45 AM Rachael Aguiar MD [facility]   2019  9:30 AM Rachael Aguiar MD [facility]   2019 11:00 AM NORAH Kaye - CNP [facility]       Rip Stone RN

## 2019-04-05 ENCOUNTER — OFFICE VISIT (OUTPATIENT)
Dept: INTERNAL MEDICINE CLINIC | Age: 84
End: 2019-04-05
Payer: MEDICARE

## 2019-04-05 VITALS
DIASTOLIC BLOOD PRESSURE: 68 MMHG | WEIGHT: 121.2 LBS | HEART RATE: 74 BPM | BODY MASS INDEX: 22.17 KG/M2 | TEMPERATURE: 98.5 F | OXYGEN SATURATION: 96 % | RESPIRATION RATE: 16 BRPM | SYSTOLIC BLOOD PRESSURE: 122 MMHG

## 2019-04-05 DIAGNOSIS — I25.10 CORONARY ARTERY DISEASE INVOLVING NATIVE CORONARY ARTERY OF NATIVE HEART WITHOUT ANGINA PECTORIS: ICD-10-CM

## 2019-04-05 DIAGNOSIS — H40.9 GLAUCOMA OF BOTH EYES, UNSPECIFIED GLAUCOMA TYPE: ICD-10-CM

## 2019-04-05 DIAGNOSIS — G20 PARKINSON DISEASE (HCC): ICD-10-CM

## 2019-04-05 DIAGNOSIS — I21.4 NON-ST ELEVATION MI (NSTEMI) (HCC): Primary | ICD-10-CM

## 2019-04-05 DIAGNOSIS — E11.9 DIABETES MELLITUS TYPE 2 IN NONOBESE (HCC): ICD-10-CM

## 2019-04-05 DIAGNOSIS — C50.912 MALIGNANT NEOPLASM OF LEFT FEMALE BREAST, UNSPECIFIED ESTROGEN RECEPTOR STATUS, UNSPECIFIED SITE OF BREAST (HCC): ICD-10-CM

## 2019-04-05 DIAGNOSIS — E78.2 MIXED HYPERLIPIDEMIA: ICD-10-CM
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
## 2019-04-05 PROCEDURE — 99495 TRANSJ CARE MGMT MOD F2F 14D: CPT | Performed by: INTERNAL MEDICINE

## 2019-04-05 PROCEDURE — 1111F DSCHRG MED/CURRENT MED MERGE: CPT | Performed by: INTERNAL MEDICINE

## 2019-04-05 RX ORDER — METFORMIN HYDROCHLORIDE 500 MG/1
500 TABLET, EXTENDED RELEASE ORAL 2 TIMES DAILY WITH MEALS
Qty: 60 TABLET | Refills: 5
Start: 2019-04-05 | End: 2019-11-06 | Stop reason: SDUPTHER

## 2019-04-05 NOTE — PATIENT INSTRUCTIONS
Patient Education        How to Get Up Safely After a Fall: Care Instructions  Your Care Instructions    If you have injuries, health problems, or other reasons that may make it easy for you to fall at home, it is a good idea to learn how to get up safely after a fall. Learning how to get up correctly can help you avoid making an injury worse. Also, knowing what to do if you cannot get up can help you stay safe until help arrives. Follow-up care is a key part of your treatment and safety. Be sure to make and go to all appointments, and call your doctor if you are having problems. It's also a good idea to know your test results and keep a list of the medicines you take. How can you care for yourself after a fall? If you think you can get up  First lie still for a few minutes and think about how you feel. If your body feels okay and you think you can get up safely, follow the rest of the steps below:  1. Look for a chair or other piece of furniture that is close to you. 2. Roll onto your side and rest. Roll by turning your head in the direction you want to roll, move your shoulder and arm, then hip and leg in the same direction. 3. Lie still for a moment to let your blood pressure adjust.  4. Slowly push your upper body up, lift your head, and take a moment to rest.  5. Slowly get up on your hands and knees, and crawl to the chair or other stable piece of furniture. 6. Put your hands on the chair. 7. Move one foot forward, and place it flat on the floor. Your other leg should be bent with the knee on the floor. 8. Rise slowly, turn your body, and sit in the chair. Stay seated for a bit and think about how you feel. Call for help. Even if you feel okay, let someone know what happened to you. You might not know that you have a serious injury. If you cannot get up  1. If you think you are injured after a fall or you cannot get up, try not to panic. 2. Call out for help.   3. If you have a phone within reach or you have an emergency call device, use it to call for help. 4. If you do not have a phone within reach, try to slide yourself toward it. If you cannot get to the phone, try to slide toward a door or window or a place where you think you can be heard. 5. [place] or use an object to make noise so someone might hear you. 6. If you can reach something that you can use for a pillow, place it under your head. Try to stay warm by covering yourself with a blanket or clothing while you wait for help. When should you call for help? Call 911 anytime you think you may need emergency care. For example, call if:    · You passed out (lost consciousness).     · You cannot get up after a fall.     · You have severe pain.    Call your doctor now or seek immediate medical care if:    · You have new or worse pain.     · You are dizzy or lightheaded.     · You hit your head.    Watch closely for changes in your health, and be sure to contact your doctor if:    · You do not get better as expected. Where can you learn more? Go to https://[employer]. org and sign in to your [employer] account. Enter S630 in the InThrMa box to learn more about \"How to Get Up Safely After a Fall: Care Instructions. \"     If you do not have an account, please click on the \"Sign Up Now\" link. Current as of: March 15, 2018  Content Version: 11.9  © 1721-4818 [employer]. Care instructions adapted under license by [facility] ([facility]). If you have questions about a medical condition or this instruction, always ask your healthcare professional. [place] [address] any warranty or liability for your use of this information.

## 2019-04-05 NOTE — PROGRESS NOTES
Post-Discharge Transitional Care Management Services or Hospital Follow Up      Karina Cannon   YOB: 1932    Date of Office Visit:  4/5/2019  Date of Hospital Admission: 3/28/19  Date of Hospital Discharge: 3/31/19  Risk of hospital readmission (high >=14%. Medium >=10%) :Readmission Risk Score: 16      Care management risk score Rising risk (score 2-5) and Complex Care (Scores >=6): 1     Non face to face  following discharge, date last encounter closed (first attempt may have been earlier): 4/2/2019 10:44 AM    Call initiated 2 business days of discharge: Yes    Patient Active Problem List   Diagnosis    Coronary artery disease suspected. refused cardio    Mixed hyperlipidemia    Stenosis of left subclavian artery    Parkinson disease ([address]    Diabetes mellitus type 2 in nonobese ([address]    Malignant neoplasm of left breast ([address]    Glaucoma of both eyes    UTI (urinary tract infection)    NSTEMI (non-ST elevated myocardial infarction) [facility]       Allergies   Allergen Reactions    Eggs Or Egg-Derived Products [Eggs Or Egg-Derived Products] Hives    Iodine      Iodine contrast       Medications listed as ordered at the time of discharge from hospital   [address] Medication Instructions CHAVO:    Printed on:04/05/19 1008   Medication Information                      aspirin 81 MG tablet  Take 81 mg by mouth daily.              atorvastatin (LIPITOR) 80 MG tablet  Take 1 tablet by mouth nightly             carbidopa-levodopa (SINEMET)  MG per tablet  Take 1 tablet by mouth 3 times daily              carvedilol (COREG) 3.125 MG tablet  Take 1 tablet by mouth 2 times daily (with meals)             clopidogrel (PLAVIX) 75 MG tablet  Take 1 tablet by mouth daily             dorzolamide-timolol (COSOPT) 22.3-6.8 MG/ML ophthalmic solution  Place 1 drop into both eyes 2 times daily             fluticasone (FLONASE) 50 MCG/ACT nasal spray  1 spray by Nasal route daily glipiZIDE (GLUCOTROL) 5 MG tablet  Take 1 tablet by mouth daily             lisinopril (PRINIVIL;ZESTRIL) 5 MG tablet  Take 1 tablet by mouth daily             nitroGLYCERIN (NITROSTAT) 0.4 MG SL tablet  up to max of 3 total doses. If no relief after 1 dose, call 911. Medications marked \"taking\" at this time  Outpatient Medications Marked as Taking for the 4/5/19 encounter (Office Visit) with Amaya Maya MD   Medication Sig Dispense Refill    nitroGLYCERIN (NITROSTAT) 0.4 MG SL tablet up to max of 3 total doses. If no relief after 1 dose, call 911. 25 tablet 0    atorvastatin (LIPITOR) 80 MG tablet Take 1 tablet by mouth nightly 30 tablet 0    lisinopril (PRINIVIL;ZESTRIL) 5 MG tablet Take 1 tablet by mouth daily 30 tablet 0    carvedilol (COREG) 3.125 MG tablet Take 1 tablet by mouth 2 times daily (with meals) 60 tablet 0    clopidogrel (PLAVIX) 75 MG tablet Take 1 tablet by mouth daily 30 tablet 0    glipiZIDE (GLUCOTROL) 5 MG tablet Take 1 tablet by mouth daily 30 tablet 5    fluticasone (FLONASE) 50 MCG/ACT nasal spray 1 spray by Nasal route daily 1 Bottle 0    dorzolamide-timolol (COSOPT) 22.3-6.8 MG/ML ophthalmic solution Place 1 drop into both eyes 2 times daily  3    carbidopa-levodopa (SINEMET)  MG per tablet Take 1 tablet by mouth 3 times daily  90 tablet     aspirin 81 MG tablet Take 81 mg by mouth daily. Medications patient taking as of now reconciled against medications ordered at time of hospital discharge: Yes    Chief Complaint   Patient presents with    Follow-Up from [address] [place] [address]     discharged from [facility]- [facility]       History of Present illness - Follow up of Hospital diagnosis(es):   Pt fell at home mechanical fall. Missed last 3 steps and fell on the floor  Could not get up. Neighbor came with squad. Was taken to ER > Non-STEMI. Suspected to have CAD in the past but pt had refused w/u  This time had cath done.  Triple vessel disease : but refused CABG.  Pt is on plavix, lipitor 80 mg daily, stopped metformn. Started on coreg and lisinopril   On NTG SL     Inpatient course: Discharge summary reviewed- see chart. Interval history/Current status:   Feels well  Resume metformin  Continue plavix, ASA, coreg, lisinopril and atorvastatin. Denies any chest pain. No SOB  No HA       A comprehensive review of systems was negative except for what was noted in the HPI. Vitals:    04/05/19 0952   BP: 122/68   Site: Left Upper Arm   Position: Sitting   Cuff Size: Child   Pulse: 74   Resp: 16   Temp: 98.5 °F (36.9 °C)   TempSrc: Oral   SpO2: 96%   Weight: 121 lb 3.2 oz (55 kg)     Body mass index is 22.17 kg/m². Wt Readings from Last 3 Encounters:   04/05/19 121 lb 3.2 oz (55 kg)   04/04/19 120 lb (54.4 kg)   03/31/19 132 lb 15 oz (60.3 kg)     BP Readings from Last 3 Encounters:   04/05/19 122/68   04/04/19 118/66   03/31/19 (!) 143/71        Physical Exam:   GENERAL: - Alert, oriented, pleasant, in no apparent distress. HEENT: - Conjunctiva pink, no scleral icterus. ENT clear. NECK: -Supple. No jugular venous distention noted. No masses felt,  CARDIOVASCULAR: - Normal S1 and S2    PULMONARY: - No respiratory distress. No wheezes or rales. ABDOMEN: - Soft and non-tender,no masses  ororganomegaly. EXTREMITIES: - No cyanosis, clubbing, or significant edema. SKIN: Skin is warm and dry. NEUROLOGICAL: - Cranial nerves II through XII are grossly intact.             Assessment/Plan:  1. Non-ST elevation MI (NSTEMI) ([facility]  Pt had MI. Continue present treatement  Refused CABG    2. Coronary artery disease involving native coronary artery of native heart without angina pectoris  Has CAD: refused CABG  - SD DISCHARGE MEDS RECONCILED W/ CURRENT OUTPATIENT MED LIST    3. Diabetes mellitus type 2 in nonobese [facility])  Patient is stable. Continue current treatment. 4. Parkinson disease ([facility]  Taking med. Cont same.      5. Glaucoma of both eyes, unspecified glaucoma

## 2019-04-18 ENCOUNTER — CARE COORDINATION (OUTPATIENT)
Dept: CASE MANAGEMENT | Age: 84
End: 2019-04-18

## 2019-04-18 NOTE — CARE COORDINATION
[facility] Transitions Follow Up Call    2019    Patient: Vilma Pack  Patient : 1932   MRN: [id number]  Reason for Admission:   Discharge Date: 3/31/19 RARS: Readmission Risk Score: 12         Spoke with:  Patient 's daughter    Care Transitions Subsequent and Final Call    Subsequent and Final Calls  Have your medications changed?:  No  Do you have any questions related to your medications?:  No  Do you currently have any active services?:  No  Do you have any needs or concerns that I can assist you with?:  No  Identified Barriers:  None  Care Transitions Interventions  Other Interventions: Follow Up:  Spoke with patient's daughter. Reports that patient is doing well. Denies CP, SOB or worsening symptoms. Instructed on s/s to report to MD.    Patient's daughter reports that patient is taking her medications as directed and that her symptoms are well managed at this time. Denies any questions, equipment or resource needs. Denies the need for continued Care Transition. Confirmed CTC contact information. Encouraged call back if needs arise. Reviewed upcoming appointment dates and times. Instructed patient's daughter to contact patient's PCP for any change in patient condition/ worsening symptoms. No further outreach planned.    Future Appointments   Date Time Provider Iman Sow   2019  9:30 AM Yogi Bill MD [facility] Teresa  SHELLY   2019 11:00 AM NORAH Uaglde - CNP [facility]       Beatriz Correia RN

## 2019-04-25 RX ORDER — LISINOPRIL 5 MG/1
5 TABLET ORAL DAILY
Qty: 30 TABLET | Refills: 3 | Status: SHIPPED | OUTPATIENT
Start: 2019-04-25 | End: 2019-08-16 | Stop reason: SDUPTHER

## 2019-04-25 RX ORDER — CARVEDILOL 3.12 MG/1
3.12 TABLET ORAL 2 TIMES DAILY WITH MEALS
Qty: 60 TABLET | Refills: 3 | Status: SHIPPED | OUTPATIENT
Start: 2019-04-25 | End: 2019-08-16 | Stop reason: SDUPTHER

## 2019-04-25 RX ORDER — ATORVASTATIN CALCIUM 80 MG/1
80 TABLET, FILM COATED ORAL NIGHTLY
Qty: 30 TABLET | Refills: 3 | Status: SHIPPED | OUTPATIENT
Start: 2019-04-25 | End: 2019-08-19 | Stop reason: SDUPTHER

## 2019-04-26 PROBLEM — N39.0 UTI (URINARY TRACT INFECTION): Status: RESOLVED | Noted: 2019-03-27 | Resolved: 2019-04-26

## 2019-05-02 RX ORDER — CLOPIDOGREL BISULFATE 75 MG/1
75 TABLET ORAL DAILY
Qty: 90 TABLET | Refills: 3 | Status: SHIPPED | OUTPATIENT
Start: 2019-05-02 | End: 2020-02-06 | Stop reason: SDUPTHER

## 2019-05-06 ENCOUNTER — OFFICE VISIT (OUTPATIENT)
Dept: INTERNAL MEDICINE CLINIC | Age: 84
End: 2019-05-06
Payer: MEDICARE

## 2019-05-06 VITALS
DIASTOLIC BLOOD PRESSURE: 64 MMHG | WEIGHT: 115.8 LBS | TEMPERATURE: 98.8 F | RESPIRATION RATE: 16 BRPM | OXYGEN SATURATION: 94 % | HEART RATE: 80 BPM | BODY MASS INDEX: 21.18 KG/M2 | SYSTOLIC BLOOD PRESSURE: 110 MMHG

## 2019-05-06 DIAGNOSIS — I21.4 NON-ST ELEVATION MI (NSTEMI) (HCC): ICD-10-CM

## 2019-05-06 DIAGNOSIS — C50.912 MALIGNANT NEOPLASM OF LEFT FEMALE BREAST, UNSPECIFIED ESTROGEN RECEPTOR STATUS, UNSPECIFIED SITE OF BREAST (HCC): ICD-10-CM

## 2019-05-06 DIAGNOSIS — I77.1 STENOSIS OF LEFT SUBCLAVIAN ARTERY (HCC): ICD-10-CM

## 2019-05-06 DIAGNOSIS — E11.9 DIABETES MELLITUS TYPE 2 IN NONOBESE (HCC): ICD-10-CM

## 2019-05-06 DIAGNOSIS — J06.9 VIRAL UPPER RESPIRATORY TRACT INFECTION: ICD-10-CM

## 2019-05-06 DIAGNOSIS — H40.9 GLAUCOMA OF BOTH EYES, UNSPECIFIED GLAUCOMA TYPE: ICD-10-CM

## 2019-05-06 DIAGNOSIS — I25.10 CORONARY ARTERY DISEASE INVOLVING NATIVE CORONARY ARTERY OF NATIVE HEART WITHOUT ANGINA PECTORIS: ICD-10-CM

## 2019-05-06 DIAGNOSIS — G20 PARKINSON DISEASE (HCC): ICD-10-CM

## 2019-05-06 DIAGNOSIS — E78.2 MIXED HYPERLIPIDEMIA: ICD-10-CM

## 2019-05-06 LAB
A/G RATIO: 1.6 (ref 1.1–2.2)
ALBUMIN SERPL-MCNC: 4.3 G/DL (ref 3.4–5)
ALP BLD-CCNC: 94 U/L (ref 40–129)
ALT SERPL-CCNC: 26 U/L (ref 10–40)
ANION GAP SERPL CALCULATED.3IONS-SCNC: 18 MMOL/L (ref 3–16)
AST SERPL-CCNC: 15 U/L (ref 15–37)
BILIRUB SERPL-MCNC: 0.4 MG/DL (ref 0–1)
BUN BLDV-MCNC: 14 MG/DL (ref 7–20)
CALCIUM SERPL-MCNC: 10 MG/DL (ref 8.3–10.6)
CHLORIDE BLD-SCNC: 105 MMOL/L (ref 99–110)
CHOLESTEROL, FASTING: 90 MG/DL (ref 0–199)
CO2: 22 MMOL/L (ref 21–32)
CREAT SERPL-MCNC: 0.6 MG/DL (ref 0.6–1.2)
GFR AFRICAN AMERICAN: >60
GFR NON-AFRICAN AMERICAN: >60
GLOBULIN: 2.7 G/DL
GLUCOSE BLD-MCNC: 110 MG/DL (ref 70–99)
HDLC SERPL-MCNC: 45 MG/DL (ref 40–60)
LDL CHOLESTEROL CALCULATED: 29 MG/DL
POTASSIUM SERPL-SCNC: 5 MMOL/L (ref 3.5–5.1)
SODIUM BLD-SCNC: 145 MMOL/L (ref 136–145)
TOTAL PROTEIN: 7 G/DL (ref 6.4–8.2)
TRIGLYCERIDE, FASTING: 78 MG/DL (ref 0–150)
VLDLC SERPL CALC-MCNC: 16 MG/DL

## 2019-05-06 PROCEDURE — 1036F TOBACCO NON-USER: CPT | Performed by: INTERNAL MEDICINE

## 2019-05-06 PROCEDURE — 4040F PNEUMOC VAC/ADMIN/RCVD: CPT | Performed by: INTERNAL MEDICINE

## 2019-05-06 PROCEDURE — 99214 OFFICE O/P EST MOD 30 MIN: CPT | Performed by: INTERNAL MEDICINE

## 2019-05-06 PROCEDURE — G8420 CALC BMI NORM PARAMETERS: HCPCS | Performed by: INTERNAL MEDICINE

## 2019-05-06 PROCEDURE — 36415 COLL VENOUS BLD VENIPUNCTURE: CPT | Performed by: INTERNAL MEDICINE

## 2019-05-06 PROCEDURE — G8427 DOCREV CUR MEDS BY ELIG CLIN: HCPCS | Performed by: INTERNAL MEDICINE

## 2019-05-06 PROCEDURE — 1123F ACP DISCUSS/DSCN MKR DOCD: CPT | Performed by: INTERNAL MEDICINE

## 2019-05-06 PROCEDURE — 1090F PRES/ABSN URINE INCON ASSESS: CPT | Performed by: INTERNAL MEDICINE

## 2019-05-06 PROCEDURE — G8598 ASA/ANTIPLAT THER USED: HCPCS | Performed by: INTERNAL MEDICINE

## 2019-05-06 ASSESSMENT — ENCOUNTER SYMPTOMS
EYES NEGATIVE: 1
NAUSEA: 0
ALLERGIC/IMMUNOLOGIC NEGATIVE: 1
COUGH: 0
BLOOD IN STOOL: 0
SHORTNESS OF BREATH: 0
GASTROINTESTINAL NEGATIVE: 1
WHEEZING: 0

## 2019-05-06 NOTE — ASSESSMENT & PLAN NOTE
Patient is stable. Continue current treatment. Continue metformin  mg  Bid and glipizide   BS are good at home.

## 2019-05-06 NOTE — ASSESSMENT & PLAN NOTE
Patient has history of breast cancer and is just stable.   Last mammogram in February 2019 was normal

## 2019-05-06 NOTE — ASSESSMENT & PLAN NOTE
Patient Parkinson disease and sees Dr. Robbin Fuentes. She is on Sinemet. She still has tremors of the head and hands.

## 2019-05-06 NOTE — PROGRESS NOTES
Maribel Tena  Patient's  is 1932  Seen in office on 2019      SUBJECTIVE:  Launa Hashimoto aby 80 y. o.year old female presents today   Chief Complaint   Patient presents with    3 Month Follow-Up    Cough     white sputum    Drainage     sinus    Sinus Problem     pressure     Patient is here for follow-up of diabetes, coronary artery disease, hyperlipidemia. Patient complains of some sinus drainage. When he knows and his sneezing for the last few days. No fever or chills. Has mild cough with whitish sputum. No shortness of breath. Patient has Flonase but she is not taking it. She is not taking any other medication for that. Issue and has coronary artery disease and recent MI. Denies any chest pain. No angina. No shortness of breath and no pedal edema. She is ambulating with the help of cane without any problems. Patient has diabetes mellitus and her blood sugars are running good at home. Today's blood sugar was 114. No hypoglycemic symptoms. Taking medications  Patient has hyperlipidemia and is on simvastatin. Issue is a Parkinson's disease and has some tremors. He is still able to ambulate well  Taking medications regularly. No side effects noted. Review of Systems   Constitutional: Negative. HENT: Negative. Eyes: Negative. Respiratory: Negative for cough, shortness of breath and wheezing. Cardiovascular: Negative. Gastrointestinal: Negative. Negative for blood in stool and nausea. Endocrine: Negative. Genitourinary: Negative. Musculoskeletal: Negative. Skin: Negative. Allergic/Immunologic: Negative. Neurological: Positive for tremors. Negative for light-headedness and headaches. Psychiatric/Behavioral: Negative. Negative for behavioral problems and confusion.        OBJECTIVE: /64   Pulse 80   Temp 98.8 °F (37.1 °C)   Resp 16   Wt 115 lb 12.8 oz (52.5 kg)   SpO2 94%   BMI 21.18 kg/m²     Wt Readings from Last 3 Encounters:   19 115 3/27/2019. She is on medical treatment. Patient refused CABG or stent. Parkinson disease [facility])  Patient Parkinson disease and sees Dr. Salvador. She is on Sinemet. She still has tremors of the head and hands. Viral upper respiratory tract infection  Patient has upper respiratory infection with runny nose and sneezing. Advised patient to take Claritin and Flonase  If symptoms do not improve call back. Patient states she is feeling well now. She is ambulating without any problems. She has no angina and no shortness of breath. She wants to drive again. Advised patient if she gets any chest pain or shortness of breath not to drive. She denies any more falls. Orders Placed This Encounter   Procedures    Comprehensive Metabolic Panel    Lipid, Fasting     Return to office in 3 months. Mediations reviewed with the patient. Continue current medications. Appropriate prescriptions are addressed. After visit summeryprovided. Follow up as directed sooner if needed. Questions answered and patient verbalizes understanding. Call for any problems, questions, or concerns. Allergies   Allergen Reactions    Eggs Or Egg-Derived Products [Eggs Or Egg-Derived Products] Hives    Iodine      Iodine contrast     Current Outpatient Medications   Medication Sig Dispense Refill    clopidogrel (PLAVIX) 75 MG tablet Take 1 tablet by mouth daily 90 tablet 3    atorvastatin (LIPITOR) 80 MG tablet Take 1 tablet by mouth nightly 30 tablet 3    lisinopril (PRINIVIL;ZESTRIL) 5 MG tablet Take 1 tablet by mouth daily 30 tablet 3    carvedilol (COREG) 3.125 MG tablet Take 1 tablet by mouth 2 times daily (with meals) 60 tablet 3    metFORMIN (GLUCOPHAGE-XR) 500 MG extended release tablet Take 1 tablet by mouth 2 times daily (with meals) 60 tablet 5    nitroGLYCERIN (NITROSTAT) 0.4 MG SL tablet up to max of 3 total doses.  If no relief after 1 dose, call 911. 25 tablet 0    glipiZIDE (GLUCOTROL) 5 MG tablet Take 1 tablet by mouth daily 30 tablet 5    fluticasone (FLONASE) 50 MCG/ACT nasal spray 1 spray by Nasal route daily 1 Bottle 0    dorzolamide-timolol (COSOPT) 22.3-6.8 MG/ML ophthalmic solution Place 1 drop into both eyes 2 times daily  3    carbidopa-levodopa (SINEMET)  MG per tablet Take 1 tablet by mouth 3 times daily  90 tablet     aspirin 81 MG tablet Take 81 mg by mouth daily. No current facility-administered medications for this visit. Past Medical History:   Diagnosis Date    CAD (coronary artery disease)     suspected, refused w/u    Coronary artery disease suspected. refused cardio     Suspected. Refused to see cardio.  Diabetes mellitus ([address] since 4/08    Foot ulcer ([facility] left     resolved    Glaucoma     Dr. Nia Collier    Hyperlipidemia     Left breast mass 3/22/2017    Pt seen Dr Cassidy Sarabia for that. Mammo. Neg. She thinks it scar tissue.  Malignant neoplasm of left breast ([facility] 8/31/2015    Left breast, Radiation therapy, lumpectomy 1985    Parkinson disease [facility]) 2008    dr. Daniel Price following    Stenosis of left subclavian artery [facility])     refused w/u     Past Surgical History:   Procedure Laterality Date    APPENDECTOMY      BREAST LUMPECTOMY Left 1995    MARY AND BSO Right     TONSILLECTOMY       Social History     Tobacco Use    Smoking status: Never Smoker    Smokeless tobacco: Never Used   Substance Use Topics    Alcohol use:  Yes     Alcohol/week: 0.0 oz       LAB REVIEW:  CBC:   Lab Results   Component Value Date    WBC 6.5 03/31/2019    HGB 11.0 03/31/2019    HCT 36.6 03/31/2019     03/31/2019     Lipids:   Lab Results   Component Value Date    CHOL 161 03/17/2017    TRIG 71 03/17/2017    HDL 53 10/12/2018    LDLDIRECT 117 (H) 10/12/2018    TRIGLYCFAST 75 10/12/2018     Renal:   Lab Results   Component Value Date    BUN 19 03/31/2019    CREATININE 0.7 03/31/2019     03/31/2019    K 4.3 03/31/2019    ALT 14 03/27/2019    AST 25 03/27/2019    GLUCOSE 110 03/31/2019     PT/INR: No results found for: INR  A1C:   Lab Results   Component Value Date    LABA1C 6.8 (H) 03/29/2019           Tiburcio Mccauley MD, 5/6/2019 , 12:11 PM

## 2019-05-07 DIAGNOSIS — E11.9 DIABETES MELLITUS TYPE 2 IN NONOBESE (HCC): ICD-10-CM

## 2019-07-02 ENCOUNTER — TELEPHONE (OUTPATIENT)
Dept: CARDIOLOGY CLINIC | Age: 84
End: 2019-07-02

## 2019-07-03 ENCOUNTER — OFFICE VISIT (OUTPATIENT)
Dept: INTERNAL MEDICINE CLINIC | Age: 84
End: 2019-07-03
Payer: MEDICARE

## 2019-07-03 VITALS
DIASTOLIC BLOOD PRESSURE: 62 MMHG | WEIGHT: 113 LBS | SYSTOLIC BLOOD PRESSURE: 112 MMHG | BODY MASS INDEX: 20.8 KG/M2 | OXYGEN SATURATION: 98 % | TEMPERATURE: 98 F | HEIGHT: 62 IN | HEART RATE: 73 BPM

## 2019-07-03 DIAGNOSIS — J01.00 ACUTE NON-RECURRENT MAXILLARY SINUSITIS: Primary | ICD-10-CM

## 2019-07-03 DIAGNOSIS — H10.10 ALLERGIC CONJUNCTIVITIS, UNSPECIFIED LATERALITY: ICD-10-CM

## 2019-07-03 PROCEDURE — G8598 ASA/ANTIPLAT THER USED: HCPCS | Performed by: NURSE PRACTITIONER

## 2019-07-03 PROCEDURE — 1090F PRES/ABSN URINE INCON ASSESS: CPT | Performed by: NURSE PRACTITIONER

## 2019-07-03 PROCEDURE — 99213 OFFICE O/P EST LOW 20 MIN: CPT | Performed by: NURSE PRACTITIONER

## 2019-07-03 PROCEDURE — 4040F PNEUMOC VAC/ADMIN/RCVD: CPT | Performed by: NURSE PRACTITIONER

## 2019-07-03 PROCEDURE — 1123F ACP DISCUSS/DSCN MKR DOCD: CPT | Performed by: NURSE PRACTITIONER

## 2019-07-03 PROCEDURE — G8427 DOCREV CUR MEDS BY ELIG CLIN: HCPCS | Performed by: NURSE PRACTITIONER

## 2019-07-03 PROCEDURE — G8420 CALC BMI NORM PARAMETERS: HCPCS | Performed by: NURSE PRACTITIONER

## 2019-07-03 PROCEDURE — 1036F TOBACCO NON-USER: CPT | Performed by: NURSE PRACTITIONER

## 2019-07-03 RX ORDER — AMOXICILLIN AND CLAVULANATE POTASSIUM 875; 125 MG/1; MG/1
1 TABLET, FILM COATED ORAL 2 TIMES DAILY
Qty: 14 TABLET | Refills: 0 | Status: SHIPPED | OUTPATIENT
Start: 2019-07-03 | End: 2019-07-10

## 2019-07-03 RX ORDER — OLOPATADINE HYDROCHLORIDE 2 MG/ML
1 SOLUTION/ DROPS OPHTHALMIC DAILY
Qty: 1 BOTTLE | Refills: 0 | Status: SHIPPED | OUTPATIENT
Start: 2019-07-03 | End: 2019-07-10

## 2019-07-03 ASSESSMENT — ENCOUNTER SYMPTOMS
SHORTNESS OF BREATH: 0
EYE PAIN: 0
COUGH: 1
RHINORRHEA: 1
EYE REDNESS: 1
EYE DISCHARGE: 1
SINUS PRESSURE: 1
SORE THROAT: 1
EYE ITCHING: 0
SINUS PAIN: 0
WHEEZING: 0

## 2019-07-03 NOTE — PROGRESS NOTES
7/3/2019     Breanne Painter (:  1932) is a 80 y.o. female, here for evaluation of the following medical concerns: CONGESTION AND EYE REDNESS AND DRAINAGE X 1 WEEK. States is worsening. Has not been taking any OTC.  has been using compresses to clean eyes frequently. Review of Systems   Constitutional: Negative. HENT: Positive for congestion, ear pain (left), hearing loss, postnasal drip, rhinorrhea, sinus pressure and sore throat. Negative for sinus pain and sneezing. Eyes: Positive for discharge and redness. Negative for pain, itching and visual disturbance. Respiratory: Positive for cough. Negative for shortness of breath and wheezing. Cardiovascular: Negative. Neurological: Negative. Prior to Visit Medications    Medication Sig Taking? Authorizing Provider   olopatadine (PATADAY) 0.2 % SOLN ophthalmic solution Place 1 drop into both eyes daily for 7 days Yes NORAH Sweet CNP   amoxicillin-clavulanate (AUGMENTIN) 875-125 MG per tablet Take 1 tablet by mouth 2 times daily for 7 days Yes NORAH Sweet CNP   blood glucose test strips (FREESTYLE LITE) strip PATIENT TESTING ONCE DAILY AND AS NEEDED, DX=E11.9 Yes Arias Soni MD   clopidogrel (PLAVIX) 75 MG tablet Take 1 tablet by mouth daily Yes Juliann Barcenas MD   atorvastatin (LIPITOR) 80 MG tablet Take 1 tablet by mouth nightly Yes Juliann Barcenas MD   lisinopril (PRINIVIL;ZESTRIL) 5 MG tablet Take 1 tablet by mouth daily Yes Juliann Barcenas MD   carvedilol (COREG) 3.125 MG tablet Take 1 tablet by mouth 2 times daily (with meals) Yes Juliann Barcenas MD   metFORMIN (GLUCOPHAGE-XR) 500 MG extended release tablet Take 1 tablet by mouth 2 times daily (with meals) Yes Arias Soni MD   nitroGLYCERIN (NITROSTAT) 0.4 MG SL tablet up to max of 3 total doses. If no relief after 1 dose, call 911.  Yes Norma Ceballos DO   glipiZIDE (GLUCOTROL) 5 MG tablet Take 1 tablet by mouth daily Yes Arias Soni MD

## 2019-07-25 ENCOUNTER — OFFICE VISIT (OUTPATIENT)
Dept: CARDIOLOGY CLINIC | Age: 84
End: 2019-07-25
Payer: MEDICARE

## 2019-07-25 VITALS
DIASTOLIC BLOOD PRESSURE: 60 MMHG | BODY MASS INDEX: 20.43 KG/M2 | SYSTOLIC BLOOD PRESSURE: 128 MMHG | HEIGHT: 62 IN | RESPIRATION RATE: 16 BRPM | WEIGHT: 111 LBS | HEART RATE: 68 BPM

## 2019-07-25 DIAGNOSIS — E78.2 MIXED HYPERLIPIDEMIA: ICD-10-CM

## 2019-07-25 DIAGNOSIS — I25.10 CORONARY ARTERY DISEASE INVOLVING NATIVE CORONARY ARTERY OF NATIVE HEART WITHOUT ANGINA PECTORIS: ICD-10-CM

## 2019-07-25 DIAGNOSIS — I77.1 STENOSIS OF LEFT SUBCLAVIAN ARTERY (HCC): ICD-10-CM

## 2019-07-25 DIAGNOSIS — E11.9 DIABETES MELLITUS TYPE 2 IN NONOBESE (HCC): ICD-10-CM

## 2019-07-25 DIAGNOSIS — I25.119 CORONARY ARTERY DISEASE INVOLVING NATIVE CORONARY ARTERY OF NATIVE HEART WITH ANGINA PECTORIS (HCC): Primary | ICD-10-CM

## 2019-07-25 DIAGNOSIS — I21.4 NON-ST ELEVATION MI (NSTEMI) (HCC): ICD-10-CM

## 2019-07-25 PROCEDURE — 99214 OFFICE O/P EST MOD 30 MIN: CPT | Performed by: INTERNAL MEDICINE

## 2019-07-25 PROCEDURE — G8420 CALC BMI NORM PARAMETERS: HCPCS | Performed by: INTERNAL MEDICINE

## 2019-07-25 PROCEDURE — 1036F TOBACCO NON-USER: CPT | Performed by: INTERNAL MEDICINE

## 2019-07-25 PROCEDURE — G8427 DOCREV CUR MEDS BY ELIG CLIN: HCPCS | Performed by: INTERNAL MEDICINE

## 2019-07-25 PROCEDURE — 1123F ACP DISCUSS/DSCN MKR DOCD: CPT | Performed by: INTERNAL MEDICINE

## 2019-07-25 PROCEDURE — 4040F PNEUMOC VAC/ADMIN/RCVD: CPT | Performed by: INTERNAL MEDICINE

## 2019-07-25 PROCEDURE — 1090F PRES/ABSN URINE INCON ASSESS: CPT | Performed by: INTERNAL MEDICINE

## 2019-07-25 PROCEDURE — G8598 ASA/ANTIPLAT THER USED: HCPCS | Performed by: INTERNAL MEDICINE

## 2019-07-25 NOTE — PROGRESS NOTES
OFFICE PROGRESS NOTES      Brandy Carroll is a 80 y.o. female who has    CHIEF COMPLAINT AS FOLLOWS:  CHEST PAIN: Patient denies any C/O chest pains at this time. SOB: No C/O SOB at this time. LEG EDEMA: No leg edema   PALPITATIONS: Denies any C/O Palpitations                                   DIZZINESS: No C/O Dizziness                           SYNCOPE: None   OTHER:                                     HPI: Patient is here for F/U on her CAD, HTN & Dyslipidemia problems. She does not have any complaints at this time. Eduardo Nolan has the following history recorded in care path:  Patient Active Problem List    Diagnosis Date Noted    Viral upper respiratory tract infection 05/06/2019    [facility]     Glaucoma of both eyes 06/09/2016    Diabetes mellitus type 2 in nonobese ([facility] 08/31/2015    Malignant neoplasm of left breast ([facility] 08/31/2015    Parkinson disease ([facility]     Coronary artery disease suspected. refused cardio     Mixed hyperlipidemia     Stenosis of left subclavian artery      Current Outpatient Medications   Medication Sig Dispense Refill    blood glucose test strips (FREESTYLE LITE) strip PATIENT TESTING ONCE DAILY AND AS NEEDED, DX=E11.9 50 strip 3    clopidogrel (PLAVIX) 75 MG tablet Take 1 tablet by mouth daily 90 tablet 3    atorvastatin (LIPITOR) 80 MG tablet Take 1 tablet by mouth nightly 30 tablet 3    lisinopril (PRINIVIL;ZESTRIL) 5 MG tablet Take 1 tablet by mouth daily 30 tablet 3    carvedilol (COREG) 3.125 MG tablet Take 1 tablet by mouth 2 times daily (with meals) 60 tablet 3    metFORMIN (GLUCOPHAGE-XR) 500 MG extended release tablet Take 1 tablet by mouth 2 times daily (with meals) 60 tablet 5    nitroGLYCERIN (NITROSTAT) 0.4 MG SL tablet up to max of 3 total doses.  If no relief after 1 dose, call 911. 25 tablet 0    glipiZIDE (GLUCOTROL) 5 MG tablet Take 1 tablet by

## 2019-07-25 NOTE — PATIENT INSTRUCTIONS
CAD:Yes   clinically stable. Patient is on optimal medical regimen ( see medication list above )  -     CORONARY ARTERY DISEASE: Patient is currently  asymptomatic from CAD. - changes in  treatment:   no           - Testing ordered:  no  [facility] classification: 1  [place] RISK SCORE:  [place] RISK SCORE:  HTN:well controlled on current medical regimen, see list above. - changes in  treatment:   no   CARDIOMYOPATHY: None known   CONGESTIVE HEART FAILURE: NO KNOWN HISTORY.    VHD: No significant VHD noted  DYSLIPIDEMIA: Patient's profile is at / near [employer],                                                             Tolerating current medical regimen wellyes,                                                               See most recent Lab values in Labs section above. Diabetes mellitis: .yes,                                      Managed by family MD                                     BS under good control yes,                                      Hgb A1c avilable yes,   OTHER RELEVANT DIAGNOSIS:as noted in patient's active problem list:  Swollen feet probably due to venous insufficiency. TESTS ORDERED: None this visit                                             All previously ordered tests reviewed. ARRHYTHMIAS: None known   MEDICATIONS: CPM. Patient to use compression stockings & keep feet propped up. Office f/u in six months. Primary/secondary prevention is the goal by aggressive risk modification, healthy and therapeutic life style changes for cardiovascular risk reduction. Various goals are discussed and multiple questions answered.

## 2019-08-06 ENCOUNTER — OFFICE VISIT (OUTPATIENT)
Dept: INTERNAL MEDICINE CLINIC | Age: 84
End: 2019-08-06
Payer: MEDICARE

## 2019-08-06 VITALS
SYSTOLIC BLOOD PRESSURE: 116 MMHG | OXYGEN SATURATION: 98 % | RESPIRATION RATE: 16 BRPM | TEMPERATURE: 98 F | DIASTOLIC BLOOD PRESSURE: 61 MMHG | BODY MASS INDEX: 19.72 KG/M2 | WEIGHT: 107.8 LBS | HEART RATE: 80 BPM

## 2019-08-06 DIAGNOSIS — E78.2 MIXED HYPERLIPIDEMIA: ICD-10-CM

## 2019-08-06 DIAGNOSIS — H40.9 GLAUCOMA OF BOTH EYES, UNSPECIFIED GLAUCOMA TYPE: ICD-10-CM

## 2019-08-06 DIAGNOSIS — J04.0 ACUTE LARYNGITIS: ICD-10-CM

## 2019-08-06 DIAGNOSIS — L98.9 SKIN LESION OF NECK: ICD-10-CM

## 2019-08-06 DIAGNOSIS — G20 PARKINSON DISEASE (HCC): ICD-10-CM

## 2019-08-06 DIAGNOSIS — E11.9 DIABETES MELLITUS TYPE 2 IN NONOBESE (HCC): Primary | ICD-10-CM

## 2019-08-06 DIAGNOSIS — I25.119 CORONARY ARTERY DISEASE INVOLVING NATIVE CORONARY ARTERY OF NATIVE HEART WITH ANGINA PECTORIS (HCC): ICD-10-CM

## 2019-08-06 DIAGNOSIS — C50.912 MALIGNANT NEOPLASM OF LEFT FEMALE BREAST, UNSPECIFIED ESTROGEN RECEPTOR STATUS, UNSPECIFIED SITE OF BREAST (HCC): ICD-10-CM

## 2019-08-06 PROBLEM — J06.9 VIRAL UPPER RESPIRATORY TRACT INFECTION: Status: RESOLVED | Noted: 2019-05-06 | Resolved: 2019-08-06

## 2019-08-06 PROCEDURE — G8598 ASA/ANTIPLAT THER USED: HCPCS | Performed by: INTERNAL MEDICINE

## 2019-08-06 PROCEDURE — 99214 OFFICE O/P EST MOD 30 MIN: CPT | Performed by: INTERNAL MEDICINE

## 2019-08-06 PROCEDURE — 1123F ACP DISCUSS/DSCN MKR DOCD: CPT | Performed by: INTERNAL MEDICINE

## 2019-08-06 PROCEDURE — 1036F TOBACCO NON-USER: CPT | Performed by: INTERNAL MEDICINE

## 2019-08-06 PROCEDURE — G8420 CALC BMI NORM PARAMETERS: HCPCS | Performed by: INTERNAL MEDICINE

## 2019-08-06 PROCEDURE — G8427 DOCREV CUR MEDS BY ELIG CLIN: HCPCS | Performed by: INTERNAL MEDICINE

## 2019-08-06 PROCEDURE — 4040F PNEUMOC VAC/ADMIN/RCVD: CPT | Performed by: INTERNAL MEDICINE

## 2019-08-06 PROCEDURE — 1090F PRES/ABSN URINE INCON ASSESS: CPT | Performed by: INTERNAL MEDICINE

## 2019-08-06 RX ORDER — AZITHROMYCIN 250 MG/1
TABLET, FILM COATED ORAL
Qty: 1 PACKET | Refills: 0 | Status: SHIPPED | OUTPATIENT
Start: 2019-08-06 | End: 2019-11-06 | Stop reason: ALTCHOICE

## 2019-08-06 ASSESSMENT — ENCOUNTER SYMPTOMS
ALLERGIC/IMMUNOLOGIC NEGATIVE: 1
EYES NEGATIVE: 1
GASTROINTESTINAL NEGATIVE: 1
RESPIRATORY NEGATIVE: 1

## 2019-08-16 RX ORDER — ATORVASTATIN CALCIUM 80 MG/1
80 TABLET, FILM COATED ORAL NIGHTLY
Qty: 30 TABLET | Refills: 3 | Status: CANCELLED | OUTPATIENT
Start: 2019-08-16

## 2019-08-19 RX ORDER — CARVEDILOL 3.12 MG/1
3.12 TABLET ORAL 2 TIMES DAILY WITH MEALS
Qty: 60 TABLET | Refills: 5 | Status: SHIPPED | OUTPATIENT
Start: 2019-08-19 | End: 2020-02-06 | Stop reason: SDUPTHER

## 2019-08-19 RX ORDER — GLIPIZIDE 5 MG/1
5 TABLET ORAL DAILY
Qty: 30 TABLET | Refills: 5 | Status: SHIPPED | OUTPATIENT
Start: 2019-08-19 | End: 2019-11-06 | Stop reason: ALTCHOICE

## 2019-08-19 RX ORDER — ATORVASTATIN CALCIUM 80 MG/1
80 TABLET, FILM COATED ORAL NIGHTLY
Qty: 30 TABLET | Refills: 5 | Status: SHIPPED | OUTPATIENT
Start: 2019-08-19 | End: 2020-02-06 | Stop reason: SDUPTHER

## 2019-08-19 RX ORDER — LISINOPRIL 5 MG/1
5 TABLET ORAL DAILY
Qty: 30 TABLET | Refills: 5 | Status: SHIPPED | OUTPATIENT
Start: 2019-08-19 | End: 2020-02-06 | Stop reason: SDUPTHER

## 2019-09-16 ENCOUNTER — CARE COORDINATION (OUTPATIENT)
Dept: CARE COORDINATION | Age: 84
End: 2019-09-16

## 2019-09-23 ENCOUNTER — CARE COORDINATION (OUTPATIENT)
Dept: CARE COORDINATION | Age: 84
End: 2019-09-23

## 2019-09-23 SDOH — HEALTH STABILITY: PHYSICAL HEALTH: ON AVERAGE, HOW MANY MINUTES DO YOU ENGAGE IN EXERCISE AT THIS LEVEL?: 20 MIN

## 2019-09-23 SDOH — SOCIAL STABILITY: SOCIAL NETWORK
IN A TYPICAL WEEK, HOW MANY TIMES DO YOU TALK ON THE PHONE WITH FAMILY, FRIENDS, OR NEIGHBORS?: MORE THAN THREE TIMES A WEEK

## 2019-09-23 SDOH — ECONOMIC STABILITY: TRANSPORTATION INSECURITY
IN THE PAST 12 MONTHS, HAS THE LACK OF TRANSPORTATION KEPT YOU FROM MEDICAL APPOINTMENTS OR FROM GETTING MEDICATIONS?: NO

## 2019-09-23 SDOH — ECONOMIC STABILITY: FOOD INSECURITY: WITHIN THE PAST 12 MONTHS, THE FOOD YOU BOUGHT JUST DIDN'T LAST AND YOU DIDN'T HAVE MONEY TO GET MORE.: NEVER TRUE

## 2019-09-23 SDOH — ECONOMIC STABILITY: FOOD INSECURITY: WITHIN THE PAST 12 MONTHS, YOU WORRIED THAT YOUR FOOD WOULD RUN OUT BEFORE YOU GOT MONEY TO BUY MORE.: NEVER TRUE

## 2019-09-23 SDOH — ECONOMIC STABILITY: INCOME INSECURITY: HOW HARD IS IT FOR YOU TO PAY FOR THE VERY BASICS LIKE FOOD, HOUSING, MEDICAL CARE, AND HEATING?: NOT HARD AT ALL

## 2019-09-23 SDOH — HEALTH STABILITY: PHYSICAL HEALTH: ON AVERAGE, HOW MANY DAYS PER WEEK DO YOU ENGAGE IN MODERATE TO STRENUOUS EXERCISE (LIKE A BRISK WALK)?: 5 DAYS

## 2019-09-23 SDOH — SOCIAL STABILITY: SOCIAL NETWORK: ARE YOU MARRIED, WIDOWED, DIVORCED, SEPARATED, NEVER MARRIED, OR LIVING WITH A PARTNER?: DIVORCED

## 2019-09-23 SDOH — HEALTH STABILITY: MENTAL HEALTH: HOW OFTEN DO YOU HAVE A DRINK CONTAINING ALCOHOL?: NEVER

## 2019-09-23 SDOH — HEALTH STABILITY: MENTAL HEALTH
STRESS IS WHEN SOMEONE FEELS TENSE, NERVOUS, ANXIOUS, OR CAN'T SLEEP AT NIGHT BECAUSE THEIR MIND IS TROUBLED. HOW STRESSED ARE YOU?: NOT AT ALL

## 2019-09-23 SDOH — ECONOMIC STABILITY: TRANSPORTATION INSECURITY
IN THE PAST 12 MONTHS, HAS LACK OF TRANSPORTATION KEPT YOU FROM MEETINGS, WORK, OR FROM GETTING THINGS NEEDED FOR DAILY LIVING?: NO

## 2019-09-23 NOTE — CARE COORDINATION
nasal spray 1 spray by Nasal route daily 7/16/18   Rock Velasquez MD   dorzolamide-timolol (COSOPT) 22.3-6.8 MG/ML ophthalmic solution Place 1 drop into both eyes 2 times daily 11/18/16   Historical Provider, MD   carbidopa-levodopa (SINEMET)  MG per tablet Take 1 tablet by mouth 3 times daily  12/20/16   Rock Velasquez MD   aspirin 81 MG tablet Take 81 mg by mouth daily.     Historical Provider, MD       Future Appointments   Date Time Provider Iman Sow   11/6/2019  9:15 AM Rock Velasquez MD [facility]   2/6/2020 10:40 AM Solo Cardozo MD [facility] MMA

## 2019-09-26 RX ORDER — METFORMIN HYDROCHLORIDE 500 MG/1
TABLET, EXTENDED RELEASE ORAL
Qty: 60 TABLET | Refills: 5 | Status: SHIPPED | OUTPATIENT
Start: 2019-09-26 | End: 2019-11-06 | Stop reason: SDUPTHER

## 2019-10-15 NOTE — ASSESSMENT & PLAN NOTE
Patient has glaucoma and is taking eyedrops.   She is stable The patient is scheduled to see Dr Hwang 10/21/2019@ 2:30pm

## 2019-11-06 ENCOUNTER — CARE COORDINATION (OUTPATIENT)
Dept: CARE COORDINATION | Age: 84
End: 2019-11-06

## 2019-11-06 ENCOUNTER — OFFICE VISIT (OUTPATIENT)
Dept: INTERNAL MEDICINE CLINIC | Age: 84
End: 2019-11-06
Payer: MEDICARE

## 2019-11-06 VITALS
DIASTOLIC BLOOD PRESSURE: 60 MMHG | BODY MASS INDEX: 19.53 KG/M2 | WEIGHT: 106.8 LBS | TEMPERATURE: 97.7 F | HEART RATE: 72 BPM | RESPIRATION RATE: 16 BRPM | SYSTOLIC BLOOD PRESSURE: 122 MMHG

## 2019-11-06 DIAGNOSIS — H40.9 GLAUCOMA OF BOTH EYES, UNSPECIFIED GLAUCOMA TYPE: ICD-10-CM

## 2019-11-06 DIAGNOSIS — E11.9 DIABETES MELLITUS TYPE 2 IN NONOBESE (HCC): Primary | ICD-10-CM

## 2019-11-06 DIAGNOSIS — I25.119 CORONARY ARTERY DISEASE INVOLVING NATIVE CORONARY ARTERY OF NATIVE HEART WITH ANGINA PECTORIS (HCC): ICD-10-CM

## 2019-11-06 DIAGNOSIS — C50.912 MALIGNANT NEOPLASM OF LEFT FEMALE BREAST, UNSPECIFIED ESTROGEN RECEPTOR STATUS, UNSPECIFIED SITE OF BREAST (HCC): ICD-10-CM

## 2019-11-06 DIAGNOSIS — Z91.81 AT HIGH RISK FOR FALLS: ICD-10-CM

## 2019-11-06 DIAGNOSIS — G20 PARKINSON DISEASE (HCC): ICD-10-CM

## 2019-11-06 DIAGNOSIS — I77.1 STENOSIS OF LEFT SUBCLAVIAN ARTERY (HCC): ICD-10-CM

## 2019-11-06 DIAGNOSIS — E78.2 MIXED HYPERLIPIDEMIA: ICD-10-CM

## 2019-11-06 DIAGNOSIS — J30.9 ALLERGIC RHINITIS, UNSPECIFIED SEASONALITY, UNSPECIFIED TRIGGER: ICD-10-CM

## 2019-11-06 PROBLEM — J04.0 ACUTE LARYNGITIS: Status: RESOLVED | Noted: 2019-08-06 | Resolved: 2019-11-06

## 2019-11-06 PROBLEM — J01.00 ACUTE NON-RECURRENT MAXILLARY SINUSITIS: Status: RESOLVED | Noted: 2019-07-03 | Resolved: 2019-11-06

## 2019-11-06 PROBLEM — J01.00 ACUTE NON-RECURRENT MAXILLARY SINUSITIS: Status: ACTIVE | Noted: 2019-07-03

## 2019-11-06 PROBLEM — H10.10 ALLERGIC CONJUNCTIVITIS: Status: ACTIVE | Noted: 2019-07-03

## 2019-11-06 PROBLEM — H10.10 ALLERGIC CONJUNCTIVITIS: Status: RESOLVED | Noted: 2019-07-03 | Resolved: 2019-11-06

## 2019-11-06 LAB
A/G RATIO: 2.6 (ref 1.1–2.2)
ALBUMIN SERPL-MCNC: 4.9 G/DL (ref 3.4–5)
ALP BLD-CCNC: 69 U/L (ref 40–129)
ALT SERPL-CCNC: 10 U/L (ref 10–40)
ANION GAP SERPL CALCULATED.3IONS-SCNC: 15 MMOL/L (ref 3–16)
AST SERPL-CCNC: 12 U/L (ref 15–37)
BILIRUB SERPL-MCNC: 0.4 MG/DL (ref 0–1)
BUN BLDV-MCNC: 22 MG/DL (ref 7–20)
CALCIUM SERPL-MCNC: 9.7 MG/DL (ref 8.3–10.6)
CHLORIDE BLD-SCNC: 102 MMOL/L (ref 99–110)
CHOLESTEROL, FASTING: 127 MG/DL (ref 0–199)
CO2: 26 MMOL/L (ref 21–32)
CREAT SERPL-MCNC: 0.7 MG/DL (ref 0.6–1.2)
GFR AFRICAN AMERICAN: >60
GFR NON-AFRICAN AMERICAN: >60
GLOBULIN: 1.9 G/DL
GLUCOSE BLD-MCNC: 112 MG/DL (ref 70–99)
HBA1C MFR BLD: 5.3 %
HDLC SERPL-MCNC: 58 MG/DL (ref 40–60)
LDL CHOLESTEROL CALCULATED: 56 MG/DL
POTASSIUM SERPL-SCNC: 4.7 MMOL/L (ref 3.5–5.1)
SODIUM BLD-SCNC: 143 MMOL/L (ref 136–145)
TOTAL PROTEIN: 6.8 G/DL (ref 6.4–8.2)
TRIGLYCERIDE, FASTING: 64 MG/DL (ref 0–150)
VLDLC SERPL CALC-MCNC: 13 MG/DL

## 2019-11-06 PROCEDURE — 1036F TOBACCO NON-USER: CPT | Performed by: INTERNAL MEDICINE

## 2019-11-06 PROCEDURE — 83036 HEMOGLOBIN GLYCOSYLATED A1C: CPT | Performed by: INTERNAL MEDICINE

## 2019-11-06 PROCEDURE — 36415 COLL VENOUS BLD VENIPUNCTURE: CPT | Performed by: INTERNAL MEDICINE

## 2019-11-06 PROCEDURE — 1090F PRES/ABSN URINE INCON ASSESS: CPT | Performed by: INTERNAL MEDICINE

## 2019-11-06 PROCEDURE — 4040F PNEUMOC VAC/ADMIN/RCVD: CPT | Performed by: INTERNAL MEDICINE

## 2019-11-06 PROCEDURE — 1123F ACP DISCUSS/DSCN MKR DOCD: CPT | Performed by: INTERNAL MEDICINE

## 2019-11-06 PROCEDURE — G8428 CUR MEDS NOT DOCUMENT: HCPCS | Performed by: INTERNAL MEDICINE

## 2019-11-06 PROCEDURE — 99214 OFFICE O/P EST MOD 30 MIN: CPT | Performed by: INTERNAL MEDICINE

## 2019-11-06 PROCEDURE — G8598 ASA/ANTIPLAT THER USED: HCPCS | Performed by: INTERNAL MEDICINE

## 2019-11-06 PROCEDURE — G8484 FLU IMMUNIZE NO ADMIN: HCPCS | Performed by: INTERNAL MEDICINE

## 2019-11-06 PROCEDURE — G8420 CALC BMI NORM PARAMETERS: HCPCS | Performed by: INTERNAL MEDICINE

## 2019-11-06 RX ORDER — FLUTICASONE PROPIONATE 50 MCG
1 SPRAY, SUSPENSION (ML) NASAL DAILY
Qty: 1 BOTTLE | Refills: 0 | Status: SHIPPED | OUTPATIENT
Start: 2019-11-06 | End: 2022-02-07 | Stop reason: SDUPTHER

## 2019-11-06 RX ORDER — METFORMIN HYDROCHLORIDE 500 MG/1
500 TABLET, EXTENDED RELEASE ORAL 2 TIMES DAILY WITH MEALS
Qty: 60 TABLET | Refills: 5 | Status: SHIPPED | OUTPATIENT
Start: 2019-11-06 | End: 2020-09-14

## 2019-11-06 SDOH — SOCIAL STABILITY: SOCIAL NETWORK: HOW OFTEN DO YOU ATTEND CHURCH OR RELIGIOUS SERVICES?: MORE THAN 4 TIMES PER YEAR

## 2019-11-06 SDOH — SOCIAL STABILITY: SOCIAL NETWORK: HOW OFTEN DO YOU ATTENT MEETINGS OF THE CLUB OR ORGANIZATION YOU BELONG TO?: NEVER

## 2019-11-06 SDOH — SOCIAL STABILITY: SOCIAL NETWORK: HOW OFTEN DO YOU GET TOGETHER WITH FRIENDS OR RELATIVES?: THREE TIMES A WEEK

## 2019-11-06 SDOH — SOCIAL STABILITY: SOCIAL NETWORK
DO YOU BELONG TO ANY CLUBS OR ORGANIZATIONS SUCH AS CHURCH GROUPS UNIONS, FRATERNAL OR ATHLETIC GROUPS, OR SCHOOL GROUPS?: NO

## 2019-11-06 ASSESSMENT — ENCOUNTER SYMPTOMS
EYES NEGATIVE: 1
GASTROINTESTINAL NEGATIVE: 1
COUGH: 1
ALLERGIC/IMMUNOLOGIC NEGATIVE: 1

## 2019-11-19 ENCOUNTER — OFFICE VISIT (OUTPATIENT)
Dept: INTERNAL MEDICINE CLINIC | Age: 84
End: 2019-11-19
Payer: MEDICARE

## 2019-11-19 VITALS
SYSTOLIC BLOOD PRESSURE: 108 MMHG | RESPIRATION RATE: 16 BRPM | HEIGHT: 62 IN | OXYGEN SATURATION: 16 % | TEMPERATURE: 97.7 F | DIASTOLIC BLOOD PRESSURE: 70 MMHG | BODY MASS INDEX: 19.43 KG/M2 | WEIGHT: 105.6 LBS | HEART RATE: 84 BPM

## 2019-11-19 DIAGNOSIS — Z00.00 ROUTINE GENERAL MEDICAL EXAMINATION AT A HEALTH CARE FACILITY: Primary | ICD-10-CM

## 2019-11-19 PROCEDURE — G0439 PPPS, SUBSEQ VISIT: HCPCS | Performed by: INTERNAL MEDICINE

## 2019-11-19 PROCEDURE — G8598 ASA/ANTIPLAT THER USED: HCPCS | Performed by: INTERNAL MEDICINE

## 2019-11-19 PROCEDURE — 1123F ACP DISCUSS/DSCN MKR DOCD: CPT | Performed by: INTERNAL MEDICINE

## 2019-11-19 PROCEDURE — 4040F PNEUMOC VAC/ADMIN/RCVD: CPT | Performed by: INTERNAL MEDICINE

## 2019-11-19 PROCEDURE — G8484 FLU IMMUNIZE NO ADMIN: HCPCS | Performed by: INTERNAL MEDICINE

## 2019-11-19 ASSESSMENT — PATIENT HEALTH QUESTIONNAIRE - PHQ9
SUM OF ALL RESPONSES TO PHQ QUESTIONS 1-9: 1
SUM OF ALL RESPONSES TO PHQ QUESTIONS 1-9: 1

## 2019-11-19 ASSESSMENT — LIFESTYLE VARIABLES: HOW OFTEN DO YOU HAVE A DRINK CONTAINING ALCOHOL: 0

## 2019-12-16 ENCOUNTER — CARE COORDINATION (OUTPATIENT)
Dept: CARE COORDINATION | Age: 84
End: 2019-12-16

## 2020-02-03 ENCOUNTER — CARE COORDINATION (OUTPATIENT)
Dept: CARE COORDINATION | Age: 85
End: 2020-02-03

## 2020-02-03 NOTE — CARE COORDINATION
Ambulatory Care Coordination Note  2/3/2020  CM Risk Score: 2  [place] 10 Year Mortality Risk Score: 100%     ACC: Hitesh Flynn RN    Summary Note: Called pt for acm f/u. Denies symptoms/concerns. Has ov with pcp 2/5 and cardiology 2/6. Reviewed dm ed. Checks bs every morning,reports bs is staying stable, denies any issues with hypoglycemia. Jaelyn Sos to eye doctor in [place] [address] for yearly eye exam. Declines med rec, reports daughter made med checklist to bring to doctor on Wednesday. Pt reports\"I pay attention to my doctor\". No help needed in home at this time. Denies concerns/need for further resources. Advised to call pcp office with any symptoms/needs. Will graduate pt at this time and agreeable. Care Coordination Interventions    Program Enrollment:  Rising Risk  Referral from Primary Care Provider:  No  Suggested Interventions and Community Resources  Diabetes Education:  Completed  Zone Management Tools:  Completed         Goals Addressed                 This Visit's Progress     Conditions and Symptoms   On track     I will notify my provider of any symptoms that indicate a worsening of my condition. Barriers: none  Plan for overcoming my barriers: N/A  Confidence: 9/10  Anticipated Goal Completion Date: 1/6/20              Prior to Admission medications    Medication Sig Start Date End Date Taking?  Authorizing Provider   metFORMIN (GLUCOPHAGE-XR) 500 MG extended release tablet Take 1 tablet by mouth 2 times daily (with meals) 11/6/19   Georgi June MD   fluticasone Richad Jack) 50 MCG/ACT nasal spray 1 spray by Nasal route daily 11/6/19   Georgi June MD   lisinopril (PRINIVIL;ZESTRIL) 5 MG tablet Take 1 tablet by mouth daily 8/19/19   Rosa Barclay MD   carvedilol (COREG) 3.125 MG tablet Take 1 tablet by mouth 2 times daily (with meals) 8/19/19   Rosa Barclay MD   atorvastatin (LIPITOR) 80 MG tablet Take 1 tablet by mouth nightly 8/19/19   Rosa Barclay MD   blood glucose test strips (FREESTYLE LITE) strip PATIENT TESTING ONCE DAILY AND AS NEEDED, DX=E11.9 5/7/19   Liyah Oviedo MD   clopidogrel (PLAVIX) 75 MG tablet Take 1 tablet by mouth daily 5/2/19   Angie Layne MD   nitroGLYCERIN (NITROSTAT) 0.4 MG SL tablet up to max of 3 total doses. If no relief after 1 dose, call 911. 3/31/19   Dimple Nadia, DO   dorzolamide-timolol (COSOPT) 22.3-6.8 MG/ML ophthalmic solution Place 1 drop into both eyes 2 times daily 11/18/16   Historical Provider, MD   carbidopa-levodopa (SINEMET)  MG per tablet Take 1 tablet by mouth 3 times daily  12/20/16   Liyah Oviedo MD   aspirin 81 MG tablet Take 81 mg by mouth daily. Historical Provider, MD       Future Appointments   Date Time Provider Iman Sow   2/5/2020  9:00 AM Liyah Oviedo MD [facility]   2/6/2020 10:30 AM Angie Layne MD [facility]     ,   Diabetes Assessment    Medic Alert ID:  Yes  How often do you test your blood sugar?:  Daily   Do you have barriers with adherence to non-pharmacologic self-management interventions?  (Nutrition/Exercise/Self-Monitoring):  No   Have you ever had to go to the ED for symptoms of low blood sugar?:  No       No patient-reported symptoms       and   General Assessment    Do you have any symptoms that are causing concern?:  No

## 2020-02-05 ENCOUNTER — OFFICE VISIT (OUTPATIENT)
Dept: INTERNAL MEDICINE CLINIC | Age: 85
End: 2020-02-05
Payer: MEDICARE

## 2020-02-05 VITALS
TEMPERATURE: 98.3 F | HEART RATE: 84 BPM | WEIGHT: 107.8 LBS | DIASTOLIC BLOOD PRESSURE: 70 MMHG | RESPIRATION RATE: 16 BRPM | SYSTOLIC BLOOD PRESSURE: 134 MMHG | BODY MASS INDEX: 19.72 KG/M2

## 2020-02-05 LAB — HBA1C MFR BLD: 5.7 %

## 2020-02-05 PROCEDURE — 99214 OFFICE O/P EST MOD 30 MIN: CPT | Performed by: INTERNAL MEDICINE

## 2020-02-05 PROCEDURE — G8427 DOCREV CUR MEDS BY ELIG CLIN: HCPCS | Performed by: INTERNAL MEDICINE

## 2020-02-05 PROCEDURE — 1123F ACP DISCUSS/DSCN MKR DOCD: CPT | Performed by: INTERNAL MEDICINE

## 2020-02-05 PROCEDURE — 1090F PRES/ABSN URINE INCON ASSESS: CPT | Performed by: INTERNAL MEDICINE

## 2020-02-05 PROCEDURE — 1036F TOBACCO NON-USER: CPT | Performed by: INTERNAL MEDICINE

## 2020-02-05 PROCEDURE — 4040F PNEUMOC VAC/ADMIN/RCVD: CPT | Performed by: INTERNAL MEDICINE

## 2020-02-05 PROCEDURE — G8420 CALC BMI NORM PARAMETERS: HCPCS | Performed by: INTERNAL MEDICINE

## 2020-02-05 PROCEDURE — G8484 FLU IMMUNIZE NO ADMIN: HCPCS | Performed by: INTERNAL MEDICINE

## 2020-02-05 PROCEDURE — 83036 HEMOGLOBIN GLYCOSYLATED A1C: CPT | Performed by: INTERNAL MEDICINE

## 2020-02-05 ASSESSMENT — ENCOUNTER SYMPTOMS
GASTROINTESTINAL NEGATIVE: 1
WHEEZING: 0
ALLERGIC/IMMUNOLOGIC NEGATIVE: 1
EYES NEGATIVE: 1
COUGH: 1
SHORTNESS OF BREATH: 0
STRIDOR: 0
CHEST TIGHTNESS: 0

## 2020-02-05 ASSESSMENT — PATIENT HEALTH QUESTIONNAIRE - PHQ9
SUM OF ALL RESPONSES TO PHQ9 QUESTIONS 1 & 2: 0
2. FEELING DOWN, DEPRESSED OR HOPELESS: 0
SUM OF ALL RESPONSES TO PHQ QUESTIONS 1-9: 0
1. LITTLE INTEREST OR PLEASURE IN DOING THINGS: 0
SUM OF ALL RESPONSES TO PHQ QUESTIONS 1-9: 0

## 2020-02-05 NOTE — ASSESSMENT & PLAN NOTE
Left breast, Radiation therapy, lumpectomy 1985  Does not see any surgeon in f/u  Gets mammo every year  Last mammogram 2/2019

## 2020-02-05 NOTE — PROGRESS NOTES
Sully Obregon  Patient's  is 1932  Seen in office on 2020      SUBJECTIVE:  Caren Cleaning aby 80 y. o.year old female presents today   Chief Complaint   Patient presents with    3 Month Follow-Up     has cardiology appt tomorrow    Diabetes    Cough     off and on,\"only when I go out into the air\"     Patient is here for f/u of DM, HTN, allergic rhinitis, CAD   She is feeling well. No complaints  No pain in the left arm. Has weaker pulse on that side. Patient has DM. No hypoglycemia. No numbness or weakness. No dizziness. Blood sugars are good at home. BS is good 110 today. Her hemoglobin A1c was 5.2 about 3 months ago and glipizide was discontinued. Patient states her blood sugars are staying in the normal range. Patient has hypertension. Taking medications No headaches, no chest pain, no palpitations and no dizziness. CAD : Patient is stable. Continue current treatment. Patient has mild cough off and on when she goes into cold air. No wheezing. No hemoptysis. Taking medications regularly. No side effects noted. Review of Systems   Constitutional: Negative. HENT: Negative. Eyes: Negative. Respiratory: Positive for cough. Negative for chest tightness, shortness of breath, wheezing and stridor. Cardiovascular: Negative. Gastrointestinal: Negative. Endocrine: Negative. Genitourinary: Negative. Musculoskeletal: Negative. Skin: Negative. Allergic/Immunologic: Negative. Neurological: Negative. Hematological: Negative. Psychiatric/Behavioral: Negative. OBJECTIVE: /70 (Site: Right Upper Arm, Position: Sitting, Cuff Size: Child)   Pulse 84   Temp 98.3 °F (36.8 °C)   Resp 16   Wt 107 lb 12.8 oz (48.9 kg)   BMI 19.72 kg/m²     Wt Readings from Last 3 Encounters:   20 107 lb 12.8 oz (48.9 kg)   19 105 lb 9.6 oz (47.9 kg)   19 106 lb 12.8 oz (48.4 kg)      GENERAL: - Alert, oriented, pleasant, in no apparent distress.     HEENT: - Conjunctiva pink, no scleral icterus. ENT clear. NECK: -Supple. No jugular venous distention noted. No masses felt,  CARDIOVASCULAR: - Normal S1 and S2    PULMONARY: - No respiratory distress. No wheezes or rales. ABDOMEN: - Soft and non-tender,no masses  ororganomegaly. EXTREMITIES: - No cyanosis, clubbing, or significant edema. SKIN: Skin is warm and dry. NEUROLOGICAL: - Cranial nerves II through XII are grossly intact. Pt uses cane. IMPRESSION:    Encounter Diagnoses   Name Primary?  Diabetes mellitus type 2 in nonobese ([facility]) Yes    Allergic rhinitis, unspecified seasonality, unspecified trigger     Coronary artery disease involving native coronary artery of native heart with angina pectoris ([address]     Glaucoma of both eyes, unspecified glaucoma type     Malignant neoplasm of left female breast, unspecified estrogen receptor status, unspecified site of breast ([address]     Mixed hyperlipidemia     Parkinson disease ([facility]     Stenosis of left subclavian artery        ASSESSMENT/PLAN:    Diabetes mellitus type 2 in nonobese [facility])  Patient has diabetes since 2005  Her hemoglobin A1c today is 5.7  Continue metformin  mg twice a day  Patient is off of glipizide    Allergic rhinitis  Patient has allergic rhinitis and takes Flonase as needed    Coronary artery disease involving native coronary artery of native heart with angina pectoris [facility])  Patient has coronary artery disease. She has triple-vessel disease refused CABG or stent. Patient sees cardiologist Dr. Alden Holbrook. She is pain-free. No shortness of breath. She is on Plavix    Glaucoma of both eyes  She has glaucoma and uses eyedrops. Vision is fair    Malignant neoplasm of left breast ([facility])  Left breast, Radiation therapy, lumpectomy 1985  Does not see any surgeon in f/u  Gets mammo every year  Last mammogram 2/2019    Mixed hyperlipidemia  -pt is on simvastain 20 mg daily.   Follow diet and exercise    Parkinson disease ([facility] [address]  She is seeing Dr Jose Yeager  She is on Carbidopa-levadopa patient's tremors are stable    Stenosis of left subclavian artery  Pulse weaker on left radial  Carotid and subclavian bruit on left side. refused w/u  Discussed again still refused    If the cough gets worse patient should call. Orders Placed This Encounter   Procedures    POCT glycosylated hemoglobin (Hb A1C)         Return to office in 3 months    Mediations reviewed with the patient. Continue current medications. Appropriate prescriptions are addressed. After visit summeryprovided. Follow up as directed sooner if needed. Questions answered and patient verbalizes understanding. Call for any problems, questions, or concerns. Allergies   Allergen Reactions    Eggs Or Egg-Derived Products [Eggs Or Egg-Derived Products] Hives    Iodine      Iodine contrast     Current Outpatient Medications   Medication Sig Dispense Refill    metFORMIN (GLUCOPHAGE-XR) 500 MG extended release tablet Take 1 tablet by mouth 2 times daily (with meals) 60 tablet 5    fluticasone (FLONASE) 50 MCG/ACT nasal spray 1 spray by Nasal route daily 1 Bottle 0    lisinopril (PRINIVIL;ZESTRIL) 5 MG tablet Take 1 tablet by mouth daily 30 tablet 5    carvedilol (COREG) 3.125 MG tablet Take 1 tablet by mouth 2 times daily (with meals) 60 tablet 5    atorvastatin (LIPITOR) 80 MG tablet Take 1 tablet by mouth nightly 30 tablet 5    blood glucose test strips (FREESTYLE LITE) strip PATIENT TESTING ONCE DAILY AND AS NEEDED, DX=E11.9 50 strip 3    clopidogrel (PLAVIX) 75 MG tablet Take 1 tablet by mouth daily 90 tablet 3    nitroGLYCERIN (NITROSTAT) 0.4 MG SL tablet up to max of 3 total doses.  If no relief after 1 dose, call 911. 25 tablet 0    dorzolamide-timolol (COSOPT) 22.3-6.8 MG/ML ophthalmic solution Place 1 drop into both eyes 2 times daily  3    carbidopa-levodopa (SINEMET)  MG per tablet Take 1 tablet by mouth 3 times daily  90 tablet     aspirin 81 MG tablet Take 81 mg by mouth daily. No current facility-administered medications for this visit. Past Medical History:   Diagnosis Date    CAD (coronary artery disease)     suspected, refused w/u    Coronary artery disease suspected. refused cardio     Suspected. Refused to see cardio.  Diabetes mellitus ([facility] since 4/08    Foot ulcer ([facility] [address] left     resolved    Glaucoma     Dr. Osmar Gray    Hyperlipidemia     Left breast mass 3/22/2017    Pt seen Dr Holden Bustamante for that. Mammo. Neg. She thinks it scar tissue.      Malignant neoplasm of left breast ([facility] [address] 8/31/2015    Left breast, Radiation therapy, lumpectomy 1985    Parkinson disease ([facility] [address] 2008    dr. Russ Santana following    Stenosis of left subclavian artery [facility])     refused w/u     Past Surgical History:   Procedure Laterality Date    APPENDECTOMY      BREAST LUMPECTOMY Left 1995    MARY AND BSO Right     TONSILLECTOMY       Social History     Tobacco Use    Smoking status: Never Smoker    Smokeless tobacco: Never Used   Substance Use Topics    Alcohol use: Never     Alcohol/week: 0.0 standard drinks     Frequency: Never       LAB REVIEW:  CBC:   Lab Results   Component Value Date    WBC 6.5 03/31/2019    HGB 11.0 03/31/2019    HCT 36.6 03/31/2019     03/31/2019     Lipids:   Lab Results   Component Value Date    CHOL 161 03/17/2017    TRIG 71 03/17/2017    HDL 58 11/06/2019    LDLCALC 56 11/06/2019    LDLDIRECT 117 (H) 10/12/2018    TRIGLYCFAST 64 11/06/2019     Renal:   Lab Results   Component Value Date    BUN 22 11/06/2019    CREATININE 0.7 11/06/2019     11/06/2019    K 4.7 11/06/2019    ALT 10 11/06/2019    AST 12 11/06/2019    GLUCOSE 112 11/06/2019     PT/INR: No results found for: INR  A1C:   Lab Results   Component Value Date    LABA1C 5.3 11/06/2019           Dora Pavon MD, 2/5/2020 , 9:15 AM

## 2020-02-05 NOTE — ASSESSMENT & PLAN NOTE
Patient has coronary artery disease. She has triple-vessel disease refused CABG or stent. Patient sees cardiologist Dr. Jonathon Wilson. She is pain-free. No shortness of breath.   She is on Plavix

## 2020-02-06 ENCOUNTER — OFFICE VISIT (OUTPATIENT)
Dept: CARDIOLOGY CLINIC | Age: 85
End: 2020-02-06
Payer: MEDICARE

## 2020-02-06 VITALS
HEIGHT: 62 IN | HEART RATE: 84 BPM | BODY MASS INDEX: 19.69 KG/M2 | WEIGHT: 107 LBS | RESPIRATION RATE: 16 BRPM | SYSTOLIC BLOOD PRESSURE: 110 MMHG | DIASTOLIC BLOOD PRESSURE: 70 MMHG

## 2020-02-06 PROCEDURE — 1123F ACP DISCUSS/DSCN MKR DOCD: CPT | Performed by: INTERNAL MEDICINE

## 2020-02-06 PROCEDURE — 1036F TOBACCO NON-USER: CPT | Performed by: INTERNAL MEDICINE

## 2020-02-06 PROCEDURE — 99214 OFFICE O/P EST MOD 30 MIN: CPT | Performed by: INTERNAL MEDICINE

## 2020-02-06 PROCEDURE — 1090F PRES/ABSN URINE INCON ASSESS: CPT | Performed by: INTERNAL MEDICINE

## 2020-02-06 PROCEDURE — G8427 DOCREV CUR MEDS BY ELIG CLIN: HCPCS | Performed by: INTERNAL MEDICINE

## 2020-02-06 PROCEDURE — G8420 CALC BMI NORM PARAMETERS: HCPCS | Performed by: INTERNAL MEDICINE

## 2020-02-06 PROCEDURE — G8484 FLU IMMUNIZE NO ADMIN: HCPCS | Performed by: INTERNAL MEDICINE

## 2020-02-06 PROCEDURE — 4040F PNEUMOC VAC/ADMIN/RCVD: CPT | Performed by: INTERNAL MEDICINE

## 2020-02-06 RX ORDER — CLOPIDOGREL BISULFATE 75 MG/1
75 TABLET ORAL DAILY
Qty: 30 TABLET | Refills: 5 | Status: SHIPPED | OUTPATIENT
Start: 2020-02-06 | End: 2020-09-14 | Stop reason: SDUPTHER

## 2020-02-06 RX ORDER — ATORVASTATIN CALCIUM 80 MG/1
80 TABLET, FILM COATED ORAL NIGHTLY
Qty: 30 TABLET | Refills: 5 | Status: SHIPPED | OUTPATIENT
Start: 2020-02-06 | End: 2020-07-23

## 2020-02-06 RX ORDER — CARVEDILOL 6.25 MG/1
6.25 TABLET ORAL 2 TIMES DAILY WITH MEALS
Qty: 60 TABLET | Refills: 5 | Status: SHIPPED | OUTPATIENT
Start: 2020-02-06 | End: 2020-09-14 | Stop reason: SDUPTHER

## 2020-02-06 RX ORDER — LISINOPRIL 5 MG/1
5 TABLET ORAL DAILY
Qty: 30 TABLET | Refills: 5 | Status: SHIPPED | OUTPATIENT
Start: 2020-02-06 | End: 2020-09-14 | Stop reason: SDUPTHER

## 2020-02-06 RX ORDER — CARVEDILOL 3.12 MG/1
3.12 TABLET ORAL 2 TIMES DAILY WITH MEALS
Qty: 60 TABLET | Refills: 5 | Status: SHIPPED | OUTPATIENT
Start: 2020-02-06 | End: 2020-02-06

## 2020-02-06 NOTE — LETTER
[facility]  [address] [address]  Phone: [phone]  Fax: [phone]    Tori Durbin MD        February 6, 2020     Jose Haynes MD  [address]  [place] [address]    Patient: Sis Colunga  MR Number: [id number]  YOB: 1932  Date of Visit: 2/6/2020    Dear Dr. Jose Haynes: Thank you for the request for consultation for Aldean Blizzard to me for the evaluation of CAD. Below are the relevant portions of my assessment and plan of care. If you have questions, please do not hesitate to call me. I look forward to following Citlaly Robbins along with you.     Sincerely,        Tori Durbin MD

## 2020-02-06 NOTE — PROGRESS NOTES
03/30/2019     Lab Results   Component Value Date    CHOL 161 03/17/2017    CHOL 154 09/12/2016    TRIG 71 03/17/2017    TRIG 79 09/12/2016    HDL 58 11/06/2019    HDL 45 05/06/2019    LDLCALC 56 11/06/2019    LDLCALC 29 05/06/2019    LDLDIRECT 117 (H) 10/12/2018    LDLDIRECT 105 (H) 03/12/2018     Lab Results   Component Value Date    ALT 10 11/06/2019    ALT 26 05/06/2019    AST 12 (L) 11/06/2019    AST 15 05/06/2019     BMP:    Lab Results   Component Value Date     11/06/2019     05/06/2019    K 4.7 11/06/2019    K 5.0 05/06/2019     11/06/2019     05/06/2019    CO2 26 11/06/2019    CO2 22 05/06/2019    BUN 22 11/06/2019    BUN 14 05/06/2019    CREATININE 0.7 11/06/2019    CREATININE 0.6 05/06/2019     CMP:   Lab Results   Component Value Date     11/06/2019     05/06/2019    K 4.7 11/06/2019    K 5.0 05/06/2019     11/06/2019     05/06/2019    CO2 26 11/06/2019    CO2 22 05/06/2019    BUN 22 11/06/2019    BUN 14 05/06/2019    PROT 6.8 11/06/2019    PROT 7.0 05/06/2019    PROT 7.3 01/15/2013    PROT 7.2 10/06/2012     TSH:  No results found for: TSH, TSHHS    QUALITY MEASURES REVIEWED:  1.CAD:Patient is taking anti platelet agent:Yes  2. DYSLIPIDEMIA: Patient is on cholesterol lowering medication:Yes  3. Beta-Blocker therapy for CAD, if prior Myocardial Infarction:Yes  4. Atrial fibrillation & anticoagulation therapy No      Impression:    1. Coronary artery disease involving native coronary artery of native heart with angina pectoris ([facility]    2. Mixed hyperlipidemia    3. Stenosis of left subclavian artery    4. Parkinson disease ([facility] [address]    5.  Diabetes mellitus type 2 in nonobese [facility])       Patient Active Problem List   Diagnosis Code    Mixed hyperlipidemia E78.2    Stenosis of left subclavian artery I77.1    Parkinson disease ([address] G20    [facility]obese [facility] E11.9    Malignant neoplasm of left breast ([facility] C50.912    Glaucoma of both eyes H40.9    Coronary artery disease involving native coronary artery of native heart with angina pectoris ([facility]) I25.119    Allergic rhinitis J30.9       Assessment & Plan:    CAD:Yes. Patient declined CABG. clinically stable. Patient is on optimal medical regimen ( see medication list above )  -     CORONARY ARTERY DISEASE: Patient is currently  asymptomatic from CAD. - changes in  treatment:   no           - Testing ordered:  no  [facility] classification: 1  [place] RISK SCORE:  DAO RISK SCORE:  HTN:well controlled on current medical regimen, see list above. - changes in  treatment:   no   CARDIOMYOPATHY: None known   CONGESTIVE HEART FAILURE: NO KNOWN HISTORY.     VHD: No significant VHD noted  DYSLIPIDEMIA: Patient's profile is at / near [employer],                                                           Tolerating current medical regimen wellyes,                                                               See most recent Lab values in Labs section above. PAD:  Known Left Subclavian stenosis. OTHER RELEVANT DIAGNOSIS:as noted in patient's active problem list:  Carotid Bruit: Patient does not want any testing or surgery. Wants medical management as her CAD problem. TESTS ORDERED: None this visit                                            All previously ordered tests reviewed. ARRHYTHMIAS: None known   MEDICATIONS: Change Coreg to 6.25 mg PO BID. Office f/u in six months. Primary/secondary prevention is the goal by aggressive risk modification, healthy and therapeutic life style changes for cardiovascular risk reduction. Various goals are discussed and multiple questions answered.

## 2020-05-06 ENCOUNTER — OFFICE VISIT (OUTPATIENT)
Dept: INTERNAL MEDICINE CLINIC | Age: 85
End: 2020-05-06
Payer: MEDICARE

## 2020-05-06 PROCEDURE — 1090F PRES/ABSN URINE INCON ASSESS: CPT | Performed by: INTERNAL MEDICINE

## 2020-05-06 PROCEDURE — 1123F ACP DISCUSS/DSCN MKR DOCD: CPT | Performed by: INTERNAL MEDICINE

## 2020-05-06 PROCEDURE — G8427 DOCREV CUR MEDS BY ELIG CLIN: HCPCS | Performed by: INTERNAL MEDICINE

## 2020-05-06 PROCEDURE — 4040F PNEUMOC VAC/ADMIN/RCVD: CPT | Performed by: INTERNAL MEDICINE

## 2020-05-06 PROCEDURE — 99214 OFFICE O/P EST MOD 30 MIN: CPT | Performed by: INTERNAL MEDICINE

## 2020-05-06 ASSESSMENT — ENCOUNTER SYMPTOMS
WHEEZING: 0
COUGH: 0
ALLERGIC/IMMUNOLOGIC NEGATIVE: 1
SHORTNESS OF BREATH: 0
EYES NEGATIVE: 1
GASTROINTESTINAL NEGATIVE: 1
RESPIRATORY NEGATIVE: 1
RHINORRHEA: 1

## 2020-07-14 NOTE — ED NOTES
Access center called for them to contact the Hospitalist at [facility] for Transfer.      Donell Ford RN  03/27/19 3846
Perfect Serve sent to Dr. Afshin Vázquez.      Olivia Amezcua  03/27/19 0214
Decreased PO intake

## 2020-07-23 ENCOUNTER — VIRTUAL VISIT (OUTPATIENT)
Dept: CARDIOLOGY CLINIC | Age: 85
End: 2020-07-23
Payer: MEDICARE

## 2020-07-23 PROCEDURE — G8427 DOCREV CUR MEDS BY ELIG CLIN: HCPCS | Performed by: INTERNAL MEDICINE

## 2020-07-23 PROCEDURE — 99213 OFFICE O/P EST LOW 20 MIN: CPT | Performed by: INTERNAL MEDICINE

## 2020-07-23 PROCEDURE — 1090F PRES/ABSN URINE INCON ASSESS: CPT | Performed by: INTERNAL MEDICINE

## 2020-07-23 PROCEDURE — 1123F ACP DISCUSS/DSCN MKR DOCD: CPT | Performed by: INTERNAL MEDICINE

## 2020-07-23 PROCEDURE — 4040F PNEUMOC VAC/ADMIN/RCVD: CPT | Performed by: INTERNAL MEDICINE

## 2020-07-23 RX ORDER — ATORVASTATIN CALCIUM 40 MG/1
40 TABLET, FILM COATED ORAL NIGHTLY
Qty: 30 TABLET | Refills: 5 | Status: SHIPPED | OUTPATIENT
Start: 2020-07-23

## 2020-07-23 NOTE — LETTER
[facility]  [address] [address]  Phone: [phone]  Fax: [phone]    Liv Escalante MD        July 23, 2020     Harrison Moore MD  [address] [address]    Patient: Ellie Mina  MR Number: [id number]  YOB: 1932  Date of Visit: 7/23/2020    Dear Dr. Harrison Moore: Thank you for the request for consultation for Lincoln Marcum to me for the evaluation of CAD. Below are the relevant portions of my assessment and plan of care. If you have questions, please do not hesitate to call me. I look forward to following Daniella Burdick along with you.     Sincerely,        Liv Escalante MD

## 2020-07-23 NOTE — PROGRESS NOTES
OFFICE PROGRESS NOTES      Jamia Das is a 80 y.o. female who has    CHIEF COMPLAINT AS FOLLOWS:  CHEST PAIN: Patient denies any C/O chest pains at this time. SOB: No C/O SOB at this time. LEG EDEMA: No leg edema   PALPITATIONS: Denies any C/O Palpitations   DIZZINESS: No C/O Dizziness   SYNCOPE: None   OTHER:                                     HPI: Patient is here for F/U on her CAD, HTN & Dyslipidemia problems. She does not have any complaints at this time. Agustina Vasquez has the following history recorded in care path:  Patient Active Problem List    Diagnosis Date Noted    Allergic rhinitis 11/06/2019    Coronary artery disease involving native coronary artery of native heart with angina pectoris ([facility] 07/25/2019    Glaucoma of both eyes 06/09/2016    Diabetes mellitus type 2 in nonobese ([facility] 08/31/2015    Malignant neoplasm of left breast ([facility] 08/31/2015    Parkinson disease ([facility]     Mixed hyperlipidemia     Stenosis of left subclavian artery      Current Outpatient Medications   Medication Sig Dispense Refill    atorvastatin (LIPITOR) 80 MG tablet Take 1 tablet by mouth nightly 30 tablet 5    lisinopril (PRINIVIL;ZESTRIL) 5 MG tablet Take 1 tablet by mouth daily 30 tablet 5    clopidogrel (PLAVIX) 75 MG tablet Take 1 tablet by mouth daily 30 tablet 5    carvedilol (COREG) 6.25 MG tablet Take 1 tablet by mouth 2 times daily (with meals) 60 tablet 5    metFORMIN (GLUCOPHAGE-XR) 500 MG extended release tablet Take 1 tablet by mouth 2 times daily (with meals) 60 tablet 5    fluticasone (FLONASE) 50 MCG/ACT nasal spray 1 spray by Nasal route daily 1 Bottle 0    blood glucose test strips (FREESTYLE LITE) strip PATIENT TESTING ONCE DAILY AND AS NEEDED, DX=E11.9 50 strip 3    nitroGLYCERIN (NITROSTAT) 0.4 MG SL tablet up to max of 3 total doses.  If no relief after 1 dose, call 911. 25 tablet 0    dorzolamide-timolol murmur, palpitations or shortness of breath  Gastrointestinal: Negative for abdominal pain or heartburn  Genito-Urinary: Negative for urinary frequency/urgency  Musculoskeletal: Negative for muscle pain, muscular weakness, negative for pain in arm and leg or swelling in foot and leg  Neurological: Negative for dizziness, headaches, memory loss, numbness/tingling, visual changes, syncope  Dermatological: Negative for rash    Objective: There were no vitals taken for this visit. Patient not able to check BP at home. Wt Readings from Last 3 Encounters:   02/06/20 107 lb (48.5 kg)   02/05/20 107 lb 12.8 oz (48.9 kg)   11/19/19 105 lb 9.6 oz (47.9 kg)     There is no height or weight on file to calculate BMI. Patient-Reported Vitals 7/23/2020   Patient-Reported Weight 112lbs   Patient-Reported Height 5'2\"   Patient-Reported Systolic (No Data)   Patient-Reported Diastolic -   Patient-Reported Pulse -   Patient-Reported Temperature -   Patient-Reported SpO2 -   Patient-Reported Peak Flow -       There were no vitals filed for this visit. GENERAL - Alert, oriented, pleasant, in no apparent distress.     Lab Review   Lab Results   Component Value Date    CKTOTAL 874 03/27/2019    TROPONINT 0.314 03/28/2019    TROPONINT 0.278 03/28/2019     BNP:  No results found for: BNP  PT/INR:  No results found for: INR  Lab Results   Component Value Date    LABA1C 5.7 02/05/2020    LABA1C 5.3 11/06/2019     Lab Results   Component Value Date    WBC 6.5 03/31/2019    WBC 9.5 03/30/2019    HCT 36.6 (L) 03/31/2019    HCT 36.8 (L) 03/30/2019    MCV 90.1 03/31/2019    MCV 87.4 03/30/2019     03/31/2019     03/30/2019     Lab Results   Component Value Date    CHOL 161 03/17/2017    CHOL 154 09/12/2016    TRIG 71 03/17/2017    TRIG 79 09/12/2016    HDL 58 11/06/2019    HDL 45 05/06/2019    LDLCALC 56 11/06/2019    LDLCALC 29 05/06/2019    LDLDIRECT 117 (H) 10/12/2018    LDLDIRECT 105 (H) 03/12/2018     Lab Results   Component Value Date    ALT 10 11/06/2019    ALT 26 05/06/2019    AST 12 (L) 11/06/2019    AST 15 05/06/2019     BMP:    Lab Results   Component Value Date     11/06/2019     05/06/2019    K 4.7 11/06/2019    K 5.0 05/06/2019     11/06/2019     05/06/2019    CO2 26 11/06/2019    CO2 22 05/06/2019    BUN 22 11/06/2019    BUN 14 05/06/2019    CREATININE 0.7 11/06/2019    CREATININE 0.6 05/06/2019     CMP:   Lab Results   Component Value Date     11/06/2019     05/06/2019    K 4.7 11/06/2019    K 5.0 05/06/2019     11/06/2019     05/06/2019    CO2 26 11/06/2019    CO2 22 05/06/2019    BUN 22 11/06/2019    BUN 14 05/06/2019    PROT 6.8 11/06/2019    PROT 7.0 05/06/2019    PROT 7.3 01/15/2013    PROT 7.2 10/06/2012     TSH:  No results found for: TSH, TSHHS    QUALITY MEASURES REVIEWED:  1.CAD:Patient is taking anti platelet agent:Yes  2. DYSLIPIDEMIA: Patient is on cholesterol lowering medication:Yes  3. Beta-Blocker therapy for CAD, if prior Myocardial Infarction:Yes  4. Atrial fibrillation & anticoagulation therapy No    Impression:    1. Coronary artery disease involving native coronary artery of native heart with angina pectoris ([facility]    2. Mixed hyperlipidemia    3. Stenosis of left subclavian artery    4. Diabetes mellitus type 2 in nonobese [facility])       Patient Active Problem List   Diagnosis Code    Mixed hyperlipidemia E78.2    Stenosis of left subclavian artery I77.1    Parkinson disease ([address] G20    Diabetes mellitus type 2 in nonobese ([address] E11.9    Malignant neoplasm of left breast ([address] C50.912    Glaucoma of both eyes H40.9    Coronary artery disease involving native coronary artery of native heart with angina pectoris ([address] I25.119    Allergic rhinitis J30.9       Assessment & Plan:    being evaluated by a Telephone visit)encounter to address concerns as mentioned above. A caregiver was present when appropriate.  Due to this being a TeleHealth encounter (During ARRHYTHMIAS: None known   MEDICATIONS: Change Lipitor to 40 mg daily. Office f/u in six months. Primary/secondary prevention is the goal by aggressive risk modification, healthy and therapeutic life style changes for cardiovascular risk reduction.  Various goals are discussed and multiple questions answered.

## 2020-07-23 NOTE — PATIENT INSTRUCTIONS
CAD:Yes. Patient declined CABG. clinically stable. Patient is on optimal medical regimen ( see medication list above )  -     CORONARY ARTERY DISEASE: Patient is currently  asymptomatic from CAD. - changes in  treatment:   no           - Testing ordered:  no  [facility] classification: 1  [place] RISK SCORE:  DAO RISK SCORE:  HTN:well controlled on current medical regimen, see list above. - changes in  treatment:   no   CARDIOMYOPATHY: None known   CONGESTIVE HEART FAILURE: NO KNOWN HISTORY.     VHD: No significant VHD noted  DYSLIPIDEMIA: Patient's profile is at / near [employer],                                                           Tolerating current medical regimen wellyes,                                                               See most recent Lab values in Labs section above. PAD:  Known Left Subclavian stenosis. OTHER RELEVANT DIAGNOSIS:as noted in patient's active problem list:  Carotid Bruit: Patient does not want any testing or surgery. Wants medical management as for her Carotid AD problem. TESTS ORDERED: None this visit                                            All previously ordered tests reviewed. ARRHYTHMIAS: None known   MEDICATIONS: Change Lipitor to 40 mg daily. Office f/u in six months. Primary/secondary prevention is the goal by aggressive risk modification, healthy and therapeutic life style changes for cardiovascular risk reduction. Various goals are discussed and multiple questions answered.

## 2020-09-14 RX ORDER — CARVEDILOL 6.25 MG/1
6.25 TABLET ORAL 2 TIMES DAILY WITH MEALS
Qty: 60 TABLET | Refills: 5 | Status: SHIPPED | OUTPATIENT
Start: 2020-09-14 | End: 2021-09-20 | Stop reason: SDUPTHER

## 2020-09-14 RX ORDER — METFORMIN HYDROCHLORIDE 500 MG/1
TABLET, EXTENDED RELEASE ORAL
Qty: 60 TABLET | Refills: 5 | Status: SHIPPED | OUTPATIENT
Start: 2020-09-14 | End: 2021-07-30

## 2020-09-14 RX ORDER — CLOPIDOGREL BISULFATE 75 MG/1
75 TABLET ORAL DAILY
Qty: 30 TABLET | Refills: 5 | Status: SHIPPED | OUTPATIENT
Start: 2020-09-14 | End: 2022-07-07 | Stop reason: SDUPTHER

## 2020-09-14 RX ORDER — LISINOPRIL 5 MG/1
5 TABLET ORAL DAILY
Qty: 30 TABLET | Refills: 5 | Status: SHIPPED | OUTPATIENT
Start: 2020-09-14 | End: 2021-12-30 | Stop reason: SDUPTHER

## 2020-10-09 ENCOUNTER — OFFICE VISIT (OUTPATIENT)
Dept: INTERNAL MEDICINE CLINIC | Age: 85
End: 2020-10-09
Payer: MEDICARE

## 2020-10-09 VITALS
WEIGHT: 112.6 LBS | SYSTOLIC BLOOD PRESSURE: 140 MMHG | HEART RATE: 88 BPM | BODY MASS INDEX: 20.59 KG/M2 | OXYGEN SATURATION: 98 % | DIASTOLIC BLOOD PRESSURE: 72 MMHG | TEMPERATURE: 98.7 F | RESPIRATION RATE: 16 BRPM

## 2020-10-09 LAB
A/G RATIO: 2.3 (ref 1.1–2.2)
ALBUMIN SERPL-MCNC: 4.3 G/DL (ref 3.4–5)
ALP BLD-CCNC: 63 U/L (ref 40–129)
ALT SERPL-CCNC: 10 U/L (ref 10–40)
ANION GAP SERPL CALCULATED.3IONS-SCNC: 10 MMOL/L (ref 3–16)
AST SERPL-CCNC: 13 U/L (ref 15–37)
BASOPHILS ABSOLUTE: 0.1 K/UL (ref 0–0.2)
BASOPHILS RELATIVE PERCENT: 1.3 %
BILIRUB SERPL-MCNC: 0.3 MG/DL (ref 0–1)
BUN BLDV-MCNC: 21 MG/DL (ref 7–20)
CALCIUM SERPL-MCNC: 9.4 MG/DL (ref 8.3–10.6)
CHLORIDE BLD-SCNC: 106 MMOL/L (ref 99–110)
CHOLESTEROL, FASTING: 156 MG/DL (ref 0–199)
CO2: 27 MMOL/L (ref 21–32)
CREAT SERPL-MCNC: 0.8 MG/DL (ref 0.6–1.2)
EOSINOPHILS ABSOLUTE: 0.4 K/UL (ref 0–0.6)
EOSINOPHILS RELATIVE PERCENT: 5.5 %
GFR AFRICAN AMERICAN: >60
GFR NON-AFRICAN AMERICAN: >60
GLOBULIN: 1.9 G/DL
GLUCOSE BLD-MCNC: 120 MG/DL (ref 70–99)
HCT VFR BLD CALC: 34.8 % (ref 36–48)
HDLC SERPL-MCNC: 51 MG/DL (ref 40–60)
HEMOGLOBIN: 11.5 G/DL (ref 12–16)
LDL CHOLESTEROL CALCULATED: 85 MG/DL
LYMPHOCYTES ABSOLUTE: 1.3 K/UL (ref 1–5.1)
LYMPHOCYTES RELATIVE PERCENT: 20.7 %
MCH RBC QN AUTO: 27.7 PG (ref 26–34)
MCHC RBC AUTO-ENTMCNC: 33.1 G/DL (ref 31–36)
MCV RBC AUTO: 83.7 FL (ref 80–100)
MONOCYTES ABSOLUTE: 0.4 K/UL (ref 0–1.3)
MONOCYTES RELATIVE PERCENT: 6.6 %
NEUTROPHILS ABSOLUTE: 4.2 K/UL (ref 1.7–7.7)
NEUTROPHILS RELATIVE PERCENT: 65.9 %
PDW BLD-RTO: 13.8 % (ref 12.4–15.4)
PLATELET # BLD: 242 K/UL (ref 135–450)
PMV BLD AUTO: 9.3 FL (ref 5–10.5)
POTASSIUM SERPL-SCNC: 4.5 MMOL/L (ref 3.5–5.1)
RBC # BLD: 4.16 M/UL (ref 4–5.2)
SODIUM BLD-SCNC: 143 MMOL/L (ref 136–145)
TOTAL PROTEIN: 6.2 G/DL (ref 6.4–8.2)
TRIGLYCERIDE, FASTING: 100 MG/DL (ref 0–150)
VLDLC SERPL CALC-MCNC: 20 MG/DL
WBC # BLD: 6.4 K/UL (ref 4–11)

## 2020-10-09 PROCEDURE — 99214 OFFICE O/P EST MOD 30 MIN: CPT | Performed by: INTERNAL MEDICINE

## 2020-10-09 PROCEDURE — 1123F ACP DISCUSS/DSCN MKR DOCD: CPT | Performed by: INTERNAL MEDICINE

## 2020-10-09 PROCEDURE — 36415 COLL VENOUS BLD VENIPUNCTURE: CPT | Performed by: INTERNAL MEDICINE

## 2020-10-09 PROCEDURE — G8420 CALC BMI NORM PARAMETERS: HCPCS | Performed by: INTERNAL MEDICINE

## 2020-10-09 PROCEDURE — 1090F PRES/ABSN URINE INCON ASSESS: CPT | Performed by: INTERNAL MEDICINE

## 2020-10-09 PROCEDURE — 1036F TOBACCO NON-USER: CPT | Performed by: INTERNAL MEDICINE

## 2020-10-09 PROCEDURE — G8427 DOCREV CUR MEDS BY ELIG CLIN: HCPCS | Performed by: INTERNAL MEDICINE

## 2020-10-09 PROCEDURE — G8484 FLU IMMUNIZE NO ADMIN: HCPCS | Performed by: INTERNAL MEDICINE

## 2020-10-09 PROCEDURE — 4040F PNEUMOC VAC/ADMIN/RCVD: CPT | Performed by: INTERNAL MEDICINE

## 2020-10-09 NOTE — PROGRESS NOTES
Valentino Rings  Patient's  is 1932  Seen in office on 10/9/2020      SUBJECTIVE:  Jose Groves aby 80 y. o.year old female presents today   Chief Complaint   Patient presents with    Diabetes    Other     does not take flu vaccine     Patient is here for follow-up of diabetes, hyperlipidemia, Parkinson's disease, coronary artery disease and allergies. Patient states he is feeling well  She denies any falls or injuries  Denies any chest pain. No shortness of breath no cough or sputum production  Patient has hyperlipidemia and is on simvastatin. Denies any myalgias. Patient has Parkinson's disease. She is taking Sinemet but not much improvement. She has a follow-up appointment with the neurologist.  Patient has diabetes mellitus her blood sugars are running good at home. No hypoglycemia. Patient history of breast cancer refused further follow-up. She has coronary artery disease. Her allergies are in control with Flonase. Taking medications regularly. No side effects noted. Review of Systems   Constitutional: Negative. HENT: Negative. Eyes: Negative. Respiratory: Negative. Cardiovascular: Negative. Gastrointestinal: Negative. Endocrine: Negative. Genitourinary: Negative. Musculoskeletal: Negative. Skin: Negative. Allergic/Immunologic: Negative. Neurological: Positive for tremors. Negative for seizures, light-headedness and headaches. Psychiatric/Behavioral: Negative. OBJECTIVE: BP (!) 140/72 (Site: Right Upper Arm, Position: Sitting, Cuff Size: Medium Adult)   Pulse 88   Temp 98.7 °F (37.1 °C) (Oral)   Resp 16   Wt 112 lb 9.6 oz (51.1 kg)   SpO2 98%   BMI 20.59 kg/m²     Wt Readings from Last 3 Encounters:   10/09/20 112 lb 9.6 oz (51.1 kg)   20 107 lb (48.5 kg)   20 107 lb 12.8 oz (48.9 kg)      Examined with corona virus precautions. Mask , gloves and goggles. GENERAL: - Alert, oriented, pleasant, in no apparent distress.     HEENT: - stent. On medical treatment. On Plavix and statins  Sees Cardiologist Dr Jamal Sibley. No angina. Allergic rhinitis  Patient has allergic rhinitis and is taking Flonase. Orders Placed This Encounter   Procedures    CBC Auto Differential    Comprehensive Metabolic Panel    Lipid, Fasting    Hemoglobin A1C     Return to office in 3 months. Mediations reviewed with the patient. Continue current medications. Appropriate prescriptions are addressed. After visit summeryprovided. Follow up as directed sooner if needed. Questions answered and patient verbalizes understanding. Call for any problems, questions, or concerns. Allergies   Allergen Reactions    Eggs Or Egg-Derived Products [Eggs Or Egg-Derived Products] Hives    Iodine      Iodine contrast     Current Outpatient Medications   Medication Sig Dispense Refill    metFORMIN (GLUCOPHAGE-XR) 500 MG extended release tablet TAKE 1 TABLET BY MOUTH TWICE A DAY WITH MEALS 60 tablet 5    carvedilol (COREG) 6.25 MG tablet Take 1 tablet by mouth 2 times daily (with meals) 60 tablet 5    lisinopril (PRINIVIL;ZESTRIL) 5 MG tablet Take 1 tablet by mouth daily 30 tablet 5    clopidogrel (PLAVIX) 75 MG tablet Take 1 tablet by mouth daily 30 tablet 5    atorvastatin (LIPITOR) 40 MG tablet Take 1 tablet by mouth nightly 30 tablet 5    fluticasone (FLONASE) 50 MCG/ACT nasal spray 1 spray by Nasal route daily 1 Bottle 0    blood glucose test strips (FREESTYLE LITE) strip PATIENT TESTING ONCE DAILY AND AS NEEDED, DX=E11.9 50 strip 3    nitroGLYCERIN (NITROSTAT) 0.4 MG SL tablet up to max of 3 total doses. If no relief after 1 dose, call 911. 25 tablet 0    dorzolamide-timolol (COSOPT) 22.3-6.8 MG/ML ophthalmic solution Place 1 drop into both eyes 2 times daily  3    carbidopa-levodopa (SINEMET)  MG per tablet Take 1 tablet by mouth 3 times daily  90 tablet     aspirin 81 MG tablet Take 81 mg by mouth daily.        No current facility-administered medications for this visit. Past Medical History:   Diagnosis Date    CAD (coronary artery disease)     suspected, refused w/u    Coronary artery disease suspected. refused cardio     Suspected. Refused to see cardio.  Diabetes mellitus ([facility] since 4/08    Foot ulcer ([facility] [address] left     resolved    Glaucoma     Dr. Jack Hernandez    Hyperlipidemia     Left breast mass 3/22/2017    Pt seen Dr Miranda Mccoy for that. Mammo. Neg. She thinks it scar tissue.      Malignant neoplasm of left breast ([facility] [address] 8/31/2015    Left breast, Radiation therapy, lumpectomy 1985    Parkinson disease ([facility] [address] 2008    dr. Stephanie Sampson following    Stenosis of left subclavian artery [facility])     refused w/u     Past Surgical History:   Procedure Laterality Date    APPENDECTOMY      BREAST LUMPECTOMY Left 1995    MARY AND BSO Right     TONSILLECTOMY       Social History     Tobacco Use    Smoking status: Never Smoker    Smokeless tobacco: Never Used   Substance Use Topics    Alcohol use: Never     Alcohol/week: 0.0 standard drinks     Frequency: Never       LAB REVIEW:  CBC:   Lab Results   Component Value Date    WBC 6.5 03/31/2019    HGB 11.0 03/31/2019    HCT 36.6 03/31/2019     03/31/2019     Lipids:   Lab Results   Component Value Date    CHOL 161 03/17/2017    TRIG 71 03/17/2017    HDL 58 11/06/2019    LDLCALC 56 11/06/2019    LDLDIRECT 117 (H) 10/12/2018    TRIGLYCFAST 64 11/06/2019     Renal:   Lab Results   Component Value Date    BUN 22 11/06/2019    CREATININE 0.7 11/06/2019     11/06/2019    K 4.7 11/06/2019    ALT 10 11/06/2019    AST 12 11/06/2019    GLUCOSE 112 11/06/2019     PT/INR: No results found for: INR  A1C:   Lab Results   Component Value Date    LABA1C 5.7 02/05/2020           Estella Almonte MD, 10/9/2020 , 11:34 AM

## 2020-10-10 LAB
ESTIMATED AVERAGE GLUCOSE: 119.8 MG/DL
HBA1C MFR BLD: 5.8 %

## 2020-10-11 ASSESSMENT — ENCOUNTER SYMPTOMS
RESPIRATORY NEGATIVE: 1
GASTROINTESTINAL NEGATIVE: 1
EYES NEGATIVE: 1
ALLERGIC/IMMUNOLOGIC NEGATIVE: 1

## 2020-10-11 NOTE — ASSESSMENT & PLAN NOTE
Patient has Parkinson's disease and is taking Sinemet. Still has tremors.   She has a follow-up appointment

## 2020-10-11 NOTE — ASSESSMENT & PLAN NOTE
NSTEMI 3/27/19. Cath : triple vessel disease : refused CABG or stent. On medical treatment. On Plavix and statins  Sees Cardiologist Dr Jayjay Ellis. No angina.

## 2020-10-11 NOTE — ASSESSMENT & PLAN NOTE
Patient has diabetes mellitus. Hemoglobin A1c was good in the past.  Will recheck hemoglobin A1c today.   Continue metformin ER 5 mg twice a day

## 2021-01-04 ASSESSMENT — LIFESTYLE VARIABLES
AUDIT TOTAL SCORE: 1
HOW OFTEN DURING THE LAST YEAR HAVE YOU BEEN UNABLE TO REMEMBER WHAT HAPPENED THE NIGHT BEFORE BECAUSE YOU HAD BEEN DRINKING: NEVER
HOW OFTEN DURING THE LAST YEAR HAVE YOU HAD A FEELING OF GUILT OR REMORSE AFTER DRINKING: NEVER
HOW OFTEN DO YOU HAVE SIX OR MORE DRINKS ON ONE OCCASION: 0
HOW MANY STANDARD DRINKS CONTAINING ALCOHOL DO YOU HAVE ON A TYPICAL DAY: ONE OR TWO
AUDIT-C TOTAL SCORE: 0
HAVE YOU OR SOMEONE ELSE BEEN INJURED AS A RESULT OF YOUR DRINKING: 0
HOW OFTEN DO YOU HAVE A DRINK CONTAINING ALCOHOL: MONTHLY OR LESS
HOW OFTEN DURING THE LAST YEAR HAVE YOU FAILED TO DO WHAT WAS NORMALLY EXPECTED FROM YOU BECAUSE OF DRINKING: 0
HAS A RELATIVE, FRIEND, DOCTOR, OR ANOTHER HEALTH PROFESSIONAL EXPRESSED CONCERN ABOUT YOUR DRINKING OR SUGGESTED YOU CUT DOWN: NO
HOW MANY STANDARD DRINKS CONTAINING ALCOHOL DO YOU HAVE ON A TYPICAL DAY: 0
HOW OFTEN DURING THE LAST YEAR HAVE YOU NEEDED AN ALCOHOLIC DRINK FIRST THING IN THE MORNING TO GET YOURSELF GOING AFTER A NIGHT OF HEAVY DRINKING: 0
HOW OFTEN DO YOU HAVE SIX OR MORE DRINKS ON ONE OCCASION: NEVER
HAVE YOU OR SOMEONE ELSE BEEN INJURED AS A RESULT OF YOUR DRINKING: NO
AUDIT TOTAL SCORE: 0
HAS A RELATIVE, FRIEND, DOCTOR, OR ANOTHER HEALTH PROFESSIONAL EXPRESSED CONCERN ABOUT YOUR DRINKING OR SUGGESTED YOU CUT DOWN: 0
HOW OFTEN DURING THE LAST YEAR HAVE YOU HAD A FEELING OF GUILT OR REMORSE AFTER DRINKING: 0
HOW OFTEN DURING THE LAST YEAR HAVE YOU FAILED TO DO WHAT WAS NORMALLY EXPECTED FROM YOU BECAUSE OF DRINKING: NEVER
HOW OFTEN DURING THE LAST YEAR HAVE YOU NEEDED AN ALCOHOLIC DRINK FIRST THING IN THE MORNING TO GET YOURSELF GOING AFTER A NIGHT OF HEAVY DRINKING: NEVER
HOW OFTEN DURING THE LAST YEAR HAVE YOU FOUND THAT YOU WERE NOT ABLE TO STOP DRINKING ONCE YOU HAD STARTED: NEVER

## 2021-01-04 ASSESSMENT — PATIENT HEALTH QUESTIONNAIRE - PHQ9
1. LITTLE INTEREST OR PLEASURE IN DOING THINGS: 0
2. FEELING DOWN, DEPRESSED OR HOPELESS: 0
SUM OF ALL RESPONSES TO PHQ QUESTIONS 1-9: 0
SUM OF ALL RESPONSES TO PHQ QUESTIONS 1-9: 0

## 2021-01-08 ENCOUNTER — OFFICE VISIT (OUTPATIENT)
Dept: INTERNAL MEDICINE CLINIC | Age: 86
End: 2021-01-08
Payer: MEDICARE

## 2021-01-08 VITALS
TEMPERATURE: 98.6 F | DIASTOLIC BLOOD PRESSURE: 78 MMHG | HEIGHT: 62 IN | WEIGHT: 115.2 LBS | OXYGEN SATURATION: 91 % | HEART RATE: 92 BPM | BODY MASS INDEX: 21.2 KG/M2 | SYSTOLIC BLOOD PRESSURE: 116 MMHG

## 2021-01-08 VITALS
TEMPERATURE: 98.6 F | HEART RATE: 92 BPM | BODY MASS INDEX: 21.2 KG/M2 | OXYGEN SATURATION: 91 % | WEIGHT: 115.2 LBS | DIASTOLIC BLOOD PRESSURE: 78 MMHG | HEIGHT: 62 IN | SYSTOLIC BLOOD PRESSURE: 116 MMHG

## 2021-01-08 DIAGNOSIS — E78.2 MIXED HYPERLIPIDEMIA: ICD-10-CM

## 2021-01-08 DIAGNOSIS — I25.119 CORONARY ARTERY DISEASE INVOLVING NATIVE CORONARY ARTERY OF NATIVE HEART WITH ANGINA PECTORIS (HCC): ICD-10-CM

## 2021-01-08 DIAGNOSIS — E11.9 DIABETES MELLITUS TYPE 2 IN NONOBESE (HCC): ICD-10-CM

## 2021-01-08 DIAGNOSIS — J30.9 ALLERGIC RHINITIS, UNSPECIFIED SEASONALITY, UNSPECIFIED TRIGGER: ICD-10-CM

## 2021-01-08 DIAGNOSIS — C50.912 MALIGNANT NEOPLASM OF LEFT FEMALE BREAST, UNSPECIFIED ESTROGEN RECEPTOR STATUS, UNSPECIFIED SITE OF BREAST (HCC): ICD-10-CM

## 2021-01-08 DIAGNOSIS — Z00.00 ROUTINE GENERAL MEDICAL EXAMINATION AT A HEALTH CARE FACILITY: Primary | ICD-10-CM

## 2021-01-08 DIAGNOSIS — G20 PARKINSON DISEASE (HCC): ICD-10-CM

## 2021-01-08 DIAGNOSIS — I77.1 STENOSIS OF LEFT SUBCLAVIAN ARTERY (HCC): ICD-10-CM

## 2021-01-08 PROCEDURE — 4040F PNEUMOC VAC/ADMIN/RCVD: CPT | Performed by: INTERNAL MEDICINE

## 2021-01-08 PROCEDURE — G0439 PPPS, SUBSEQ VISIT: HCPCS | Performed by: INTERNAL MEDICINE

## 2021-01-08 PROCEDURE — 1123F ACP DISCUSS/DSCN MKR DOCD: CPT | Performed by: INTERNAL MEDICINE

## 2021-01-08 PROCEDURE — 1036F TOBACCO NON-USER: CPT | Performed by: INTERNAL MEDICINE

## 2021-01-08 PROCEDURE — 1090F PRES/ABSN URINE INCON ASSESS: CPT | Performed by: INTERNAL MEDICINE

## 2021-01-08 PROCEDURE — 99214 OFFICE O/P EST MOD 30 MIN: CPT | Performed by: INTERNAL MEDICINE

## 2021-01-08 PROCEDURE — G8484 FLU IMMUNIZE NO ADMIN: HCPCS | Performed by: INTERNAL MEDICINE

## 2021-01-08 PROCEDURE — G8427 DOCREV CUR MEDS BY ELIG CLIN: HCPCS | Performed by: INTERNAL MEDICINE

## 2021-01-08 PROCEDURE — G8420 CALC BMI NORM PARAMETERS: HCPCS | Performed by: INTERNAL MEDICINE

## 2021-01-08 ASSESSMENT — LIFESTYLE VARIABLES: HOW OFTEN DO YOU HAVE A DRINK CONTAINING ALCOHOL: 0

## 2021-01-08 ASSESSMENT — PATIENT HEALTH QUESTIONNAIRE - PHQ9
SUM OF ALL RESPONSES TO PHQ QUESTIONS 1-9: 0
1. LITTLE INTEREST OR PLEASURE IN DOING THINGS: 0

## 2021-01-08 NOTE — ASSESSMENT & PLAN NOTE
Left breast, Radiation therapy, lumpectomy 1985  Does not see any surgeon in f/u  Gets mammo every year  Last mammogram 2/2019 :   Does not want test now.

## 2021-01-08 NOTE — PROGRESS NOTES
Medicare Annual Wellness Visit  Name: Angelique Baugh Date: 2021   MRN: [id number] Sex: Female   Age: 80 y.o. Ethnicity: Non-/Non    : 1932 Race: Bubba Atif is here for Medicare AWV    Screenings for behavioral, psychosocial and functional/safety risks, and cognitive dysfunction are all negative except as indicated below. These results, as well as other patient data from the [address] [facility] form, are documented in Flowsheets linked to this Encounter. Allergies   Allergen Reactions    Eggs Or Egg-Derived Products [Eggs Or Egg-Derived Products] Hives    Iodine      Iodine contrast       Prior to Visit Medications    Medication Sig Taking? Authorizing Provider   metFORMIN (GLUCOPHAGE-XR) 500 MG extended release tablet TAKE 1 TABLET BY MOUTH TWICE A DAY WITH MEALS Yes Shira Sherwood MD   carvedilol (COREG) 6.25 MG tablet Take 1 tablet by mouth 2 times daily (with meals) Yes Jamia Barron MD   lisinopril (PRINIVIL;ZESTRIL) 5 MG tablet Take 1 tablet by mouth daily Yes Jamia Barron MD   clopidogrel (PLAVIX) 75 MG tablet Take 1 tablet by mouth daily Yes Jamia Barron MD   atorvastatin (LIPITOR) 40 MG tablet Take 1 tablet by mouth nightly Yes Jamia Barron MD   fluticasone (FLONASE) 50 MCG/ACT nasal spray 1 spray by Nasal route daily Yes Shira Sherwood MD   blood glucose test strips (FREESTYLE LITE) strip PATIENT TESTING ONCE DAILY AND AS NEEDED, DX=E11.9 Yes Shira Sherwood MD   dorzolamide-timolol (COSOPT) 22.3-6.8 MG/ML ophthalmic solution Place 1 drop into both eyes 2 times daily Yes Historical Provider, MD   carbidopa-levodopa (SINEMET)  MG per tablet Take 1 tablet by mouth 3 times daily  Yes Shira Sherwood MD   aspirin 81 MG tablet Take 81 mg by mouth daily. Yes Historical Provider, MD   nitroGLYCERIN (NITROSTAT) 0.4 MG SL tablet up to max of 3 total doses. If no relief after 1 dose, call 911.   Patient not taking: Reported on 2021  Saud Quiroz Factor Screenings with Interventions:          General Health and ACP:  General  In general, how would you say your health is?: Good  In the past 7 days, have you experienced any of the following?  New or Increased Pain, New or Increased Fatigue, Loneliness, Social Isolation, Stress or Anger?: None of These  Do you get the social and emotional support that you need?: Yes  Do you have a Living Will?: (!) No  Advance Directives     Power of  Living Will ACP-Advance Directive ACP-Power of     Not on File Not on File Not on File Not on File      General Health Risk Interventions:  · No Living Will: Advance Care Planning addressed with patient today    Health Habits/Nutrition:  Health Habits/Nutrition  Do you exercise for at least 20 minutes 2-3 times per week?: Yes  Have you lost any weight without trying in the past 3 months?: No  Do you eat fewer than 2 meals per day?: No  Have you seen a dentist within the past year?: (!) No  Body mass index: 21.07  Health Habits/Nutrition Interventions:  · Dental exam overdue:  patient declines dental evaluation, stating she has a partial, only about 6 teeth of her own    Hearing/Vision:  No exam data present  Hearing/Vision  Do you or your family notice any trouble with your hearing?: No  Do you have difficulty driving, watching TV, or doing any of your daily activities because of your eyesight?: No  Have you had an eye exam within the past year?: (!) No  Hearing/Vision Interventions:  · Vision concerns:  patient encouraged to make appointment with his/her eye specialist      Personalized Preventive Plan   Current Health Maintenance Status  Immunization History   Administered Date(s) Administered    Pneumococcal Conjugate 13-valent ([id number]) 09/15/2016    Pneumococcal Polysaccharide ([id number]) 08/01/2015        Health Maintenance   Topic Date Due    DTaP/Tdap/Td vaccine (1 - Tdap) 11/26/1951    Shingles Vaccine (1 of 2) 11/26/1982    Annual Wellness Visit (AWV)  05/29/2019    Flu vaccine (1) 09/01/2020    Lipid screen  10/09/2021    Potassium monitoring  10/09/2021    Creatinine monitoring  10/09/2021    Pneumococcal 65+ years Vaccine  Completed    Hepatitis A vaccine  Aged Out    Hib vaccine  Aged Out    Meningococcal (ACWY) vaccine  Aged Out     Recommendations for [place] Due: see orders and patient instructions/AVS.  . Recommended screening schedule for the next 5-10 years is provided to the patient in written form: see Patient Instructions/AVS.    Aneudy DIANA, 1/8/2021, performed the documented evaluation under the direct supervision of the attending physician.

## 2021-01-08 NOTE — PATIENT INSTRUCTIONS
Personalized Preventive Plan for Escobar Dilma - 1/8/2021  Medicare offers a range of preventive health benefits. Some of the tests and screenings are paid in full while other may be subject to a deductible, co-insurance, and/or copay. Some of these benefits include a comprehensive review of your medical history including lifestyle, illnesses that may run in your family, and various assessments and screenings as appropriate. After reviewing your medical record and screening and assessments performed today your provider may have ordered immunizations, labs, imaging, and/or referrals for you. A list of these orders (if applicable) as well as your Preventive Care list are included within your After Visit Summary for your review. Other Preventive Recommendations:    · A preventive eye exam performed by an eye specialist is recommended every 1-2 years to screen for glaucoma; cataracts, macular degeneration, and other eye disorders. · A preventive dental visit is recommended every 6 months. · Try to get at least 150 minutes of exercise per week or 10,000 steps per day on a pedometer . · Order or download the FREE \"Exercise & Physical Activity: Your Everyday Guide\" from [employer]. Call 4-156.902.1248 or search [employer] online. · You need 5760-1400 mg of calcium and 5645-1424 IU of vitamin D per day. It is possible to meet your calcium requirement with diet alone, but a vitamin D supplement is usually necessary to meet this goal.  · When exposed to the sun, use a sunscreen that protects against both UVA and UVB radiation with an SPF of 30 or greater. Reapply every 2 to 3 hours or after sweating, drying off with a towel, or swimming. · Always wear a seat belt when traveling in a car. Always wear a helmet when riding a bicycle or motorcycle. Heart-Healthy Diet   Sodium, Fat, and Cholesterol Controlled Diet       What Is a Heart Healthy Diet?    A heart-healthy diet is one that limits sodium , certain types of fat , and cholesterol . This type of diet is recommended for:   People with any form of cardiovascular disease (eg, coronary heart disease , peripheral vascular disease , previous heart attack , previous stroke )   People with risk factors for cardiovascular disease, such as high blood pressure , high cholesterol , or diabetes   Anyone who wants to lower their risk of developing cardiovascular disease   Sodium    Sodium is a mineral found in many foods. In general, most people consume much more sodium than they need. Diets high in sodium can increase blood pressure and lead to edema (water retention). On a heart-healthy diet, you should consume no more than 2,300 mg (milligrams) of sodium per dayabout the amount in one teaspoon of table salt. The foods highest in sodium include table salt (about 50% sodium), processed foods, convenience foods, and preserved foods. Cholesterol    Cholesterol is a fat-like, waxy substance in your blood. Our bodies make some cholesterol. It is also found in animal products, with the highest amounts in fatty meat, egg yolks, whole milk, cheese, shellfish, and organ meats. On a heart-healthy diet, you should limit your cholesterol intake to less than 200 mg per day. It is normal and important to have some cholesterol in your bloodstream. But too much cholesterol can cause plaque to build up within your arteries, which can eventually lead to a heart attack or stroke. The two types of cholesterol that are most commonly referred to are:   Low-density lipoprotein (LDL) cholesterol  Also known as bad cholesterol, this is the cholesterol that tends to build up along your arteries. Bad cholesterol levels are increased by eating fats that are saturated or hydrogenated. Optimal level of this cholesterol is less than 100. Over 130 starts to get risky for heart disease.    High-density lipoprotein (HDL) cholesterol  Also known as good cholesterol, this type of cholesterol actually carries cholesterol away from your arteries and may, therefore, help lower your risk of having a heart attack. You want this level to be high (ideally greater than 60). It is a risk to have a level less than 40. You can raise this good cholesterol by eating olive oil, canola oil, avocados, or nuts. Exercise raises this level, too. Fat    Fat is calorie dense and packs a lot of calories into a small amount of food. Even though fats should be limited due to their high calorie content, not all fats are bad. In fact, some fats are quite healthful. Fat can be broken down into four main types. The good-for-you fats are:   Monounsaturated fat  found in oils such as olive and canola, avocados, and nuts and natural nut butters; can decrease cholesterol levels, while keeping levels of HDL cholesterol high   Polyunsaturated fat  found in oils such as safflower, sunflower, soybean, corn, and sesame; can decrease total cholesterol and LDL cholesterol   Omega-3 fatty acids  particularly those found in fatty fish (such as salmon, trout, tuna, mackerel, herring, and sardines); can decrease risk of arrhythmias, decrease triglyceride levels, and slightly lower blood pressure   The fats that you want to limit are:   Saturated fat  found in animal products, many fast foods, and a few vegetables; increases total blood cholesterol, including LDL levels   Animal fats that are saturated include: butter, lard, whole-milk dairy products, meat fat, and poultry skin   Vegetable fats that are saturated include: hydrogenated shortening, palm oil, coconut oil, cocoa butter   Hydrogenated or trans fat  found in margarine and vegetable shortening, most shelf stable snack foods, and fried foods; increases LDL and decreases HDL     It is generally recommended that you limit your total fat for the day to less than 30% of your total calories.  If you follow an 1800-calorie heart healthy diet, for example, this would mean 60 grams of fat or less per day. Saturated fat and trans fat in your diet raises your blood cholesterol the most, much more than dietary cholesterol does. For this reason, on a heart-healthy diet, less than 7% of your calories should come from saturated fat and ideally 0% from trans fat. On an 1800-calorie diet, this translates into less than 14 grams of saturated fat per day, leaving 46 grams of fat to come from mono- and polyunsaturated fats.    Food Choices on a Heart Healthy Diet   Food Category   Foods Recommended   Foods to Avoid   Grains   Breads and rolls without salted tops Most dry and cooked cereals Unsalted crackers and breadsticks Low-sodium or homemade breadcrumbs or stuffing All rice and pastas   Breads, rolls, and crackers with salted tops High-fat baked goods (eg, muffins, donuts, pastries) Quick breads, self-rising flour, and biscuit mixes Regular bread crumbs Instant hot cereals Commercially prepared rice, pasta, or stuffing mixes   Vegetables   Most fresh, frozen, and low-sodium canned vegetables Low-sodium and salt-free vegetable juices Canned vegetables if unsalted or rinsed   Regular canned vegetables and juices, including sauerkraut and pickled vegetables Frozen vegetables with sauces Commercially prepared potato and vegetable mixes   Fruits   Most fresh, frozen, and canned fruits All fruit juices   Fruits processed with salt or sodium   Milk   Nonfat or low-fat (1%) milk Nonfat or low-fat yogurt Cottage cheese, low-fat ricotta, cheeses labeled as low-fat and low-sodium   Whole milk Reduced-fat (2%) milk Malted and chocolate milk Full fat yogurt Most cheeses (unless low-fat and low salt) Buttermilk (no more than 1 cup per week)   Meats and Beans   Lean cuts of fresh or frozen beef, veal, lamb, or pork (look for the word loin) Fresh or frozen poultry without the skin Fresh or frozen fish and some shellfish Egg whites and egg substitutes (Limit whole eggs to three per week) Tofu Nuts or seeds (unsalted, dry-roasted), low-sodium peanut butter Dried peas, beans, and lentils   Any smoked, cured, salted, or canned meat, fish, or poultry (including [place], chipped beef, cold cuts, hot dogs, sausages, sardines, and anchovies) Poultry skins Breaded and/or fried fish or meats Canned peas, beans, and lentils Salted nuts   Fats and Oils   Olive oil and canola oil Low-sodium, low-fat salad dressings and mayonnaise   Butter, margarine, coconut and palm oils, [place] fat   Snacks, Sweets, and Condiments   Low-sodium or unsalted versions of broths, soups, soy sauce, and condiments Pepper, herbs, and spices; vinegar, lemon, or lime juice Low-fat frozen desserts (yogurt, sherbet, fruit bars) Sugar, cocoa powder, honey, syrup, jam, and preserves Low-fat, trans-fat free cookies, cakes, and pies Stevo and animal crackers, fig bars, maurizio snaps   High-fat desserts Broth, soups, gravies, and sauces, made from instant mixes or other high-sodium ingredients Salted snack foods Canned olives Meat tenderizers, seasoning salt, and most flavored vinegars   Beverages   Low-sodium carbonated beverages Tea and coffee in moderation Soy milk   Commercially softened water   Suggestions   Make whole grains, fruits, and vegetables the base of your diet. Choose heart-healthy fats such as canola, olive, and flaxseed oil, and foods high in heart-healthy fats, such as nuts, seeds, soybeans, tofu, and fish. Eat fish at least twice per week; the fish highest in omega-3 fatty acids and lowest in mercury include salmon, herring, mackerel, sardines, and canned chunk light tuna. If you eat fish less than twice per week or have high triglycerides, talk to your doctor about taking fish oil supplements. Read food labels.    For products low in fat and cholesterol, look for fat free, low-fat, cholesterol free, saturated fat free, and trans fat freeAlso scan the Nutrition Facts Label, which lists saturated fat, trans fat, and cholesterol amounts. For products low in sodium, look for sodium free, very low sodium, low sodium, no added salt, and unsalted   Skip the salt when cooking or at the table; if food needs more flavor, get creative and try out different herbs and spices. Garlic and onion also add substantial flavor to foods. Trim any visible fat off meat and poultry before cooking, and drain the fat off after hubbard. Use cooking methods that require little or no added fat, such as grilling, boiling, baking, poaching, broiling, roasting, steaming, stir-frying, and sauting. Avoid fast food and convenience food. They tend to be high in saturated and trans fat and have a lot of added salt. Talk to a registered dietitian for individualized diet advice. Last Reviewed: March 2011 Deysi Beasley MS, MPH, RD   Updated: 3/29/2011   ·     Keep Your Memory Isidro Rocha       Many factors can affect your ability to remembera hectic lifestyle, aging, stress, chronic disease, and certain medicines. But, there are steps you can take to sharpen your mind and help preserve your memory. Challenge Your Brain   Regularly challenging your mind may help keeps it in top shape. Good mental exercises include:   Crossword puzzlesUse a dictionary if you need it; you will learn more that way. Brainteasers Try some! Crafts, such as wood working and sewing   Hobbies, such as gardening and building model airplanes   SocializingVisit old friends or join groups to meet new ones. Reading   Learning a new language   Taking a class, whether it be art history or kristina chi   TravelingExperience the food, history, and culture of your destination   Learning to use a computer   Going to museums, the theater, or thought-provoking movies   Changing things in your daily life, such as reversing your pattern in the grocery store or brushing your teeth using your nondominant hand   Use Memory Aids   There is no need to remember every detail on your own.  These memory aids can help:   Calendars and day planners   Electronic organizers to store all sorts of helpful informationThese devices can \"beep\" to remind you of appointments. A book of days to record birthdays, anniversaries, and other occasions that occur on the same date every year   Detailed \"to-do\" lists and strategically placed sticky notes   Quick \"study\" sessionsBefore a gathering, review who will be there so their names will be fresh in your mind. Establish routinesFor example, keep your keys, wallet, and umbrella in the same place all the time or take medicine with your 8:00 AM glass of juice   Live a Healthy Life   Many actions that will keep your body strong will do the same for your mind. For example:   Talk to Your Doctor About Herbs and Supplements    Malnutrition and vitamin deficiencies can impair your mental function. For example, vitamin B12 deficiency can cause a range of symptoms, including confusion. But, what if your nutritional needs are being met? Can herbs and supplements still offer a benefit? Researchers have investigated a range of natural remedies, such as ginkgo , ginseng , and the supplement phosphatidylserine (PS). So far, though, the evidence is inconsistent as to whether these products can improve memory or thinking. If you are interested in taking herbs and supplements, talk to your doctor first because they may interact with other medicines that you are taking. Exercise Regularly    Among the many benefits of regular exercise are increased blood flow to the brain and decreased risk of certain diseases that can interfere with memory function. One study found that even moderate exercise has a beneficial effect. Examples of \"moderate\" exercise include:   Playing 18 holes of golf once a week, without a cart   Playing tennis twice a week   Walking one mile per day   Manage Stress    It can be tough to remember what is important when your mind is cluttered.  Make time for relaxation. Choose activities that calm you down, and make it routine. Manage Chronic Conditions    Side effects of high blood pressure , diabetes, and heart disease can interfere with mental function. Many of the lifestyle steps discussed here can help manage these conditions. Strive to eat a healthy diet, exercise regularly, get stress under control, and follow your doctor's advice for your condition. Minimize Medications    Talk to your doctor about the medicines that you take. Some may be unnecessary. Also, healthy lifestyle habits may lower the need for certain drugs. Last Reviewed: April 2010 Minus MD Jesse   Updated: 4/13/2010   ·     [address]       As we get older, changes in balance, gait, strength, vision, hearing, and cognition make even the most youthful senior more prone to accidents. Falls are one of the leading health risks for older people. This increased risk of falling is related to:   Aging process (eg, decreased muscle strength, slowed reflexes)   Higher incidence of chronic health problems (eg, arthritis, diabetes) that may limit mobility, agility or sensory awareness   Side effects of medicine (eg, dizziness, blurred vision)especially medicines like prescription pain medicines and drugs used to treat mental health conditions   Depending on the brittleness of your bones, the consequences of a fall can be serious and long lasting. Home Life   Research by the Association of Aging [facility]) shows that some home accidents among older adults can be prevented by making simple lifestyle changes and basic modifications and repairs to the home environment. Here are some lifestyle changes that experts recommend:   Have your hearing and vision checked regularly. Be sure to wear prescription glasses that are right for you. Speak to your doctor or pharmacist about the possible side effects of your medicines. A number of medicines can cause dizziness.    If you have problems with sleep, talk to your doctor. Limit your intake of alcohol. If necessary, use a cane or walker to help maintain your balance. Wear supportive, rubber-soled shoes, even at home. If you live in a region that gets wintry weather, you may want to put special cleats on your shoes to prevent you from slipping on the snow and ice. Exercise regularly to help maintain muscle tone, agility, and balance. Always hold the banister when going up or down stairs. Also, use  bars when getting in or out of the bath or shower, or using the toilet. To avoid dizziness, get up slowly from a lying down position. Sit up first, dangling your legs for a minute or two before rising to a standing position. Overall Home Safety Check   According to the [employer] Safety [employer]'s \"Older Consumer Home Safety Checklist,\" it is important to check for potential hazards in each room. And remember, proper lighting is an essential factor in home safety. If you cannot see clearly, you are more likely to fall. Important questions to ask yourself include:   Are lamp, electric, extension, and telephone cords placed out of the flow of traffic and maintained in good condition? Have frayed cords been replaced? Are all small rugs and runners slip resistant? If not, you can secure them to the floor with a special double-sided carpet tape. Are smoke detectors properly locatedone on every floor of your home and one outside of every sleeping area? Are they in good working order? Are batteries replaced at least once a year? Do you have a well-maintained carbon monoxide detector outside every sleeping are in your home? Does your furniture layout leave plenty of space to maneuver between and around chairs, tables, beds, and sofas? Are hallways, stairs and passages between rooms well lit? Can you reach a lamp without getting out of bed? Are floor surfaces well maintained?  Shag rugs, high-pile carpeting, tile floors, and polished wood floors can be particularly slippery. Stairs should always have handrails and be carpeted or fitted with a non-skid tread. Is your telephone easily reachable. Is the cord safely tucked away? Room by Room   According to the Association of Aging, bathrooms and steve are the two most potentially hazardous rooms in your home. In the Kitchen    Be sure your stove is in proper working order and always make sure burners and the oven are off before you go out or go to sleep. Keep pots on the back burners, turn handles away from the front of the stove, and keep stove clean and free of grease build-up. Kitchen ventilation systems and range exhausts should be working properly. Keep flammable objects such as towels and pot holders away from the cooking area except when in use. Make sure kitchen curtains are tied back. Move cords and appliances away from the sink and hot surfaces. If extension cords are needed, install wiring guides so they do not hang over the sink, range, or working areas. Look for coffee pots, kettles and toaster ovens with automatic shut-offs. Keep a mop handy in the kitchen so you can wipe up spills instantly. You should also have a small fire extinguisher. Arrange your kitchen with frequently used items on lower shelves to avoid the need to stand on a stepstool to reach them. Make sure countertops are well-lit to avoid injuries while cutting and preparing food. In the Bathroom    Use a non-slip mat or decals in the tub and shower, since wet, soapy tile or porcelain surfaces are extremely slippery. Make sure bathroom rugs are non-skid or tape them firmly to the floor. Bathtubs should have at least one, preferably two, grab bars, firmly attached to structural supports in the wall. (Do not use built-in soap holders or glass shower doors as grab bars.)    Tub seats fitted with non-slip material on the legs allow you to wash sitting down.  For people with limited mobility, bathtub transfer benches allow you to slide safely into the tub. Raised toilet seats and toilet safety rails are helpful for those with knee or hip problems. In the [facility]    Make sure you use a nightlight and that the area around your bed is clear of potential obstacles. Be careful with electric blankets and never go to sleep with a heating pad, which can cause serious burns even if on a low setting. Use fire-resistant mattress covers and pillows, and NEVER smoke in bed. Keep a phone next to the bed that is programmed to dial 911 at the push of a button. If you have a chronic condition, you may want to sign on with an automatic call-in service. Typically the system includes a small pendant that connects directly to an emergency medical voice-response system. You should also make arrangements to stay in contact with someonefriend, neighbor, family memberon a regular schedule. Fire Prevention   According to the [employer]. (Smoke Alarms for Every) [address] [place] [address], senior citizens are one of the two highest risk groups for death and serious injuries due to residential fires. When cooking, wear short-sleeved items, never a bulky long-sleeved robe. The [facility]'s Safety Checklist for Older Consumers emphasizes the importance of checking basements, garages, workshops and storage areas for fire hazards, such as volatile liquids, piles of old rags or clothing and overloaded circuits. Never smoke in bed or when lying down on a couch or recliner chair. Small portable electric or kerosene heaters are responsible for many home fires and should be used cautiously if at all. If you do use one, be sure to keep them away from flammable materials. In case of fire, make sure you have a pre-established emergency exit plan. Have a professional check your fireplace and other fuel-burning appliances yearly.     Helping Hands   Baby boomers entering the mccoy years will continue to see the development of new products to help older adults live safely and independently in spite of age-related changes. Making Life More Livable  , by João Daniel, lists over 1,000 products for \"living well in the mature years,\" such as bathing and mobility aids, household security devices, ergonomically designed knives and peelers, and faucet valves and knobs for temperature control. Medical supply stores and organizations are good sources of information about products that improve your quality of life and insure your safety. Last Reviewed: November 2009 Serafin Gonsalez MD   Updated: 3/7/2011     ·        Advance Care Planning: Care Instructions  Your Care Instructions     It can be hard to live with an illness that cannot be cured. But if your health is getting worse, you may want to make decisions about end-of-life care. Planning for the end of your life does not mean that you are giving up. It is a way to make sure that your wishes are met. Clearly stating your wishes can make it easier for your loved ones. Making plans while you are still able may also ease your mind and make your final days less stressful and more meaningful. Follow-up care is a key part of your treatment and safety. Be sure to make and go to all appointments, and call your doctor if you are having problems. It's also a good idea to know your test results and keep a list of the medicines you take. What can you do to plan for the end of life? You can bring these issues up with your doctor. You do not need to wait until your doctor starts the conversation. You might start with \"I would not be willing to live with . Tami [employer] Tami Profit Tami Profit \" When you complete this sentence it helps your doctor understand your wishes. Talk openly and honestly with your doctor. This is the best way to understand the decisions you will need to make as your health changes. Know that you can always change your mind. Ask your doctor about commonly used life-support measures.  These include tube feedings, breathing machines, and fluids given through a vein (IV). Understanding these treatments will help you decide whether you want them. You may choose to have these life-supporting treatments for a limited time. This allows a trial period to see whether they will help you. You may also decide that you want your doctor to take only certain measures to keep you alive. It is important to spell out these conditions so that your doctor and family understand them. Talk to your doctor about how long you are likely to live. He or she may be able to give you an idea of what usually happens with your specific illness. Think about preparing papers that state your wishes. This way there will not be any confusion about what you want. You can change your instructions at any time. Which papers should you prepare? Advance directives are legal papers that tell doctors how you want to be cared for at the end of your life. You do not need a  to write these papers. Ask your doctor or your state health department for information on how to write your advance directives. They may have the forms for each of these types of papers. Make sure your doctor has a copy of these on file, and give a copy to a family member or close friend. Consider a do-not-resuscitate order (DNR). This order asks that no extra treatments be done if your heart stops or you stop breathing. Extra treatments may include cardiopulmonary resuscitation (CPR), electrical shock to restart your heart, or a machine to breathe for you. If you decide to have a DNR order, ask your doctor to explain and write it. Place the order in your home where everyone can easily see it. Consider a living will. A living will explains your wishes about life support and other treatments at the end of your life if you become unable to speak for yourself. Living aden tell doctors to use or not use treatments that would keep you alive.  You must have one or two witnesses or a notary present when you sign this form. A living will may be called something else in your state. Consider a medical power of . This form allows you to name a person to make decisions about your care if you are not able to. Most people ask a close friend or family member. Talk to this person about the kinds of treatments you want and those that you do not want. Make sure this person understands your wishes. A medical power of  may be called something else in your state. These legal papers are simple to change. Tell your doctor what you want to change, and ask him or her to make a note in your medical file. Give your family updated copies of the papers. Where can you learn more? Go to https://Cardiovascular DecisionspeOff & Awayeweb.Branchly. org and sign in to your [employer] account. Enter P184 in the [employer] box to learn more about \"Advance Care Planning: Care Instructions. \"     If you do not have an account, please click on the \"Sign Up Now\" link. Current as of: December 9, 2019               Content Version: 12.6  © 6506-8304 [employer]. Care instructions adapted under license by [facility] ([facility]). If you have questions about a medical condition or this instruction, always ask your healthcare professional. [address] any warranty or liability for your use of this information. ·        Learning About Living [place]  What is a living will? A living will, also called a declaration, is a legal form. It tells your family and your doctor your wishes when you can't speak for yourself. It's used by the health professionals who will treat you as you near the end of your life or if you get seriously hurt or ill. If you put your wishes in writing, your loved ones and others will know what kind of care you want. They won't need to guess. This can ease your mind and be helpful to others. And you can change or cancel your living will at any time.   A living will is not the same as an estate or property will. An estate will explains what you want to happen with your money and property after you die. How do you use it? A living will is used to describe the kinds of treatment or life support you want as you near the end of your life or if you get seriously hurt or ill. Keep these facts in mind about living aden. Your living will is used only if you can't speak or make decisions for yourself. Most often, one or more doctors must certify that you can't speak or decide for yourself before your living will takes effect. If you get better and can speak for yourself again, you can accept or refuse any treatment. It doesn't matter what you said in your living will. Some states may limit your right to refuse treatment in certain cases. For example, you may need to clearly state in your living will that you don't want artificial hydration and nutrition, such as being fed through a tube. Is a living will a legal document? A living will is a legal document. Each state has its own laws about living aden. And a living will may be called something else in your state. Here are some things to know about living aden. You don't need an  to complete a living will. But legal advice can be helpful if your state's laws are unclear. It can also help if your health history is complicated or your family can't agree on what should be in your living will. You can change your living will at any time. Some people find that their wishes about end-of-life care change as their health changes. If you make big changes to your living will, complete a new form. If you move to another state, make sure that your living will is legal in the state where you now live. In most cases, doctors will respect your wishes even if you have a form from a different state. You might use a universal form that has been approved by many states.  This kind of form can sometimes be filled out and stored online. Your digital copy will then be available wherever you have a connection to the internet. The doctors and nurses who need to treat you can find it right away. Your state may offer an online registry. This is another place where you can store your living will online. It's a good idea to get your living will notarized. This means using a person called a [employer] to watch two people sign, or witness, your living will. What should you know when you create a living will? Here are some questions to ask yourself as you make your living will:  Do you know enough about life support methods that might be used? If not, talk to your doctor so you know what might be done if you can't breathe on your own, your heart stops, or you can't swallow. What things would you still want to be able to do after you receive life-support methods? Would you want to be able to walk? To speak? To eat on your own? To live without the help of machines? Do you want certain Anabaptism practices performed if you become very ill? If you have a choice, where do you want to be cared for? In your home? At a hospital or nursing home? If you have a choice at the end of your life, where would you prefer to die? At home? In a hospital or nursing home? Somewhere else? Would you prefer to be buried or cremated? Do you want your organs to be donated after you die? What should you do with your living will? Make sure that your family members and your health care agent have copies of your living will (also called a declaration). Give your doctor a copy of your living will. Ask him or her to keep it as part of your medical record. If you have more than one doctor, make sure that each one has a copy. Put a copy of your living will where it can be easily found. For example, some people may put a copy on their refrigerator door.  If you are using a digital copy, be sure your doctor, family members, and health care agent know how to find and access it. Where can you learn more? Go to https://chpepiceweb.[email or web address]. org and sign in to your [employer] account. Enter L804 in the Andel box to learn more about \"Learning About Living [place]. \"     If you do not have an account, please click on the \"Sign Up Now\" link. Current as of: December 9, 2019               Content Version: 12.6  © 9293-7968 [employer]. Care instructions adapted under license by [facility] [address] If you have questions about a medical condition or this instruction, always ask your healthcare professional. [place] [address] any warranty or liability for your use of this information.     ·

## 2021-01-08 NOTE — PROGRESS NOTES
Jourdan Feliciano (:  1932) is a 80 y.o. female,Established patient, here for evaluation of the following chief complaint(s):  Diabetes, Hypertension, and Hyperlipidemia      ASSESSMENT/PLAN:  1. Diabetes mellitus type 2 in nonobese [facility])  Assessment & Plan:  DM in control. hga1c is 5.8 in 10/2020  Sees eye doctor  Continue metformin  mg bid   2. Malignant neoplasm of left female breast, unspecified estrogen receptor status, unspecified site of breast [facility])  Assessment & Plan:  Left breast, Radiation therapy, lumpectomy   Does not see any surgeon in f/u  Gets mammo every year  Last mammogram 2019 :   Does not want test now. 3. Mixed hyperlipidemia  Assessment & Plan:  -pt is on simvastain 20 mg daily. Follow diet and exercise  -lipids were good in 10/2020  4. Parkinson disease [facility])  Assessment & Plan:  She is seeing Dr Gabriel Dale, but doctor is moving. She will see other neurologist   She is on Carbidopa-levadopa   5. Stenosis of left subclavian artery  Assessment & Plan:  Pulse weaker on left radial  Carotid and subclavian bruit on left side. refused w/u  Discussed again still refused  6. Allergic rhinitis, unspecified seasonality, unspecified trigger  Assessment & Plan:  Patient has allergic rhinitis and is taking Flonase. 7. Coronary artery disease involving native coronary artery of native heart with angina pectoris [facility])  Assessment & Plan:  NSTEMI 3/27/19. Cath : triple vessel disease : refused CABG or stent. On medical treatment. On Plavix and statins  Sees Cardiologist Dr Antwan Kimbrough. No follow-ups on file. SUBJECTIVE/OBJECTIVE:  HPI       Patient is here for follow-up of diabetes, hyperlipidemia, Parkinson's disease, coronary artery disease and allergies. Patient states he is feeling well  Denies any chest pain. No shortness of breath no cough or sputum production  Patient has hyperlipidemia and is on simvastatin. Denies any myalgias. Patient has Parkinson's disease. Sees neurologist Dr Maged Jolly but she is moving and new neurologist will see her. Patient has diabetes mellitus her blood sugars are running good at home. No hypoglycemia. Patient history of breast cancer refused further follow-up. She has coronary artery disease. No angina   Her allergies are in control with Flonase. Taking medications regularly. No side effects noted. Review of Systems    Review of system is normal except as in HPI    Physical Exam  Patient was seen taking COVID-19 precautions. Face mask, gloves and eyes protectors were used. Patient also wore facemask. GENERAL: - Alert, oriented, pleasant, in no apparent distress. HEENT: - Conjunctiva pink, no scleral icterus. ENT clear. NECK: -Supple. No jugular venous distention noted. No masses felt,  CARDIOVASCULAR: - Normal S1 and S2    PULMONARY: - No respiratory distress. No wheezes or rales. ABDOMEN: - Soft and non-tender,no masses  ororganomegaly. EXTREMITIES: - No cyanosis, clubbing, or significant edema. SKIN: Skin is warm and dry. NEUROLOGICAL: - Cranial nerves II through XII are grossly intact. Tremors present. Uses cane to walk           An electronic signature was used to authenticate this note.     --Ld Parada MD

## 2021-03-08 ENCOUNTER — OFFICE VISIT (OUTPATIENT)
Dept: CARDIOLOGY CLINIC | Age: 86
End: 2021-03-08
Payer: MEDICARE

## 2021-03-08 VITALS
BODY MASS INDEX: 21.31 KG/M2 | SYSTOLIC BLOOD PRESSURE: 120 MMHG | HEART RATE: 78 BPM | HEIGHT: 62 IN | DIASTOLIC BLOOD PRESSURE: 80 MMHG | WEIGHT: 115.8 LBS

## 2021-03-08 DIAGNOSIS — I77.1 STENOSIS OF LEFT SUBCLAVIAN ARTERY (HCC): ICD-10-CM

## 2021-03-08 DIAGNOSIS — I25.119 CORONARY ARTERY DISEASE INVOLVING NATIVE CORONARY ARTERY OF NATIVE HEART WITH ANGINA PECTORIS (HCC): Primary | ICD-10-CM

## 2021-03-08 DIAGNOSIS — E11.9 DIABETES MELLITUS TYPE 2 IN NONOBESE (HCC): ICD-10-CM

## 2021-03-08 DIAGNOSIS — E78.2 MIXED HYPERLIPIDEMIA: ICD-10-CM

## 2021-03-08 PROCEDURE — 1036F TOBACCO NON-USER: CPT | Performed by: INTERNAL MEDICINE

## 2021-03-08 PROCEDURE — G8484 FLU IMMUNIZE NO ADMIN: HCPCS | Performed by: INTERNAL MEDICINE

## 2021-03-08 PROCEDURE — 1090F PRES/ABSN URINE INCON ASSESS: CPT | Performed by: INTERNAL MEDICINE

## 2021-03-08 PROCEDURE — 1123F ACP DISCUSS/DSCN MKR DOCD: CPT | Performed by: INTERNAL MEDICINE

## 2021-03-08 PROCEDURE — G8427 DOCREV CUR MEDS BY ELIG CLIN: HCPCS | Performed by: INTERNAL MEDICINE

## 2021-03-08 PROCEDURE — G8420 CALC BMI NORM PARAMETERS: HCPCS | Performed by: INTERNAL MEDICINE

## 2021-03-08 PROCEDURE — 99214 OFFICE O/P EST MOD 30 MIN: CPT | Performed by: INTERNAL MEDICINE

## 2021-03-08 PROCEDURE — 4040F PNEUMOC VAC/ADMIN/RCVD: CPT | Performed by: INTERNAL MEDICINE

## 2021-03-08 RX ORDER — NITROGLYCERIN 0.4 MG/1
TABLET SUBLINGUAL
Qty: 25 TABLET | Refills: 0 | Status: SHIPPED | OUTPATIENT
Start: 2021-03-08 | End: 2022-03-22 | Stop reason: SDUPTHER

## 2021-03-08 NOTE — LETTER
[facility]  [address] [address]  Phone: [phone]  Fax: [phone]    Fanny Benton MD        March 8, 2021     Sherwin Sheth MD  [address] [place] [address]    Patient: Judosn Lemos  MR Number: [id number]  YOB: 1932  Date of Visit: 3/8/2021    Dear Dr. Sherwin Sheth: Thank you for the request for consultation for Dina Lewis to me for the evaluation of CAD / PVD. Below are the relevant portions of my assessment and plan of care. If you have questions, please do not hesitate to call me. I look forward to following Rodriguez Peers along with you.     Sincerely,        Fanny Benton MD

## 2021-03-08 NOTE — LETTER
Patient Name: Anish García  : 1932  MRN# [id number]    REASON FOR VISIT:       Current Outpatient Medications   Medication Sig Dispense Refill    metFORMIN (GLUCOPHAGE-XR) 500 MG extended release tablet TAKE 1 TABLET BY MOUTH TWICE A DAY WITH MEALS 60 tablet 5    carvedilol (COREG) 6.25 MG tablet Take 1 tablet by mouth 2 times daily (with meals) 60 tablet 5    lisinopril (PRINIVIL;ZESTRIL) 5 MG tablet Take 1 tablet by mouth daily 30 tablet 5    clopidogrel (PLAVIX) 75 MG tablet Take 1 tablet by mouth daily 30 tablet 5    atorvastatin (LIPITOR) 40 MG tablet Take 1 tablet by mouth nightly 30 tablet 5    fluticasone (FLONASE) 50 MCG/ACT nasal spray 1 spray by Nasal route daily 1 Bottle 0    blood glucose test strips (FREESTYLE LITE) strip PATIENT TESTING ONCE DAILY AND AS NEEDED, DX=E11.9 50 strip 3    nitroGLYCERIN (NITROSTAT) 0.4 MG SL tablet up to max of 3 total doses. If no relief after 1 dose, call 911. 25 tablet 0    dorzolamide-timolol (COSOPT) 22.3-6.8 MG/ML ophthalmic solution Place 1 drop into both eyes 2 times daily  3    carbidopa-levodopa (SINEMET)  MG per tablet Take 1 tablet by mouth 3 times daily  90 tablet     aspirin 81 MG tablet Take 81 mg by mouth daily. No current facility-administered medications for this visit. STRESS TEST:  NONE    ECHO: 3/28/2019  Left ventricular function is normal, EF is estimated at 50%. Hypokinetic lateral wall. Grade III diastolic dysfunction. Mild concentric left ventricular hypertrophy. Right ventricular systolic pressure of 39 mm Hg suggestive of mild pulmonary   hypertension. Mild mitral regurgitation is present. Moderate tricuspid regurgitation is present. No evidence of any pericardial effusion  CAROTID: NONE    MUGA: NONE    LAST PACER CHECK: NONE    CARDIAC CATH: 3/28/2019  1. 3 vessel CAD as described.  In particular Heavily Calcified,   critical bifurcation disease of LAD/ Diagonal in addition to   being small in size. 2. Normal LV systolic function. LVEF is > 55 %. Patient tolerated the procedure well. No immediate complications. Amio Protocol:    CHADS:   [id number] Score for Atrial Fibrillation Stroke Risk   Risk   Factors  Component Value   C CHF No 0   H HTN No 0   A2 Age >= 76 Yes,  (80 y.o.) 2   D DM Yes 1   S2 Prior Stroke/TIA No 0   V Vascular Disease Yes 1   A Age 74-69 No,  (80 y.o.) 0   Sc Sex female 1    [id number]  Score  5   Score last updated 3/8/21 9:16 AM EST    Click here for a link to the UpToDate guideline \"Atrial Fibrillation: Anticoagulation therapy to prevent embolization    Disclaimer: Risk Score calculation is dependent on accuracy of patient problem list and past encounter diagnosis.

## 2021-03-08 NOTE — PROGRESS NOTES
OFFICE PROGRESS NOTES      Diane Ty is a 80 y.o. female who has    CHIEF COMPLAINT AS FOLLOWS:  CHEST PAIN: Patient denies any C/O chest pains at this time.      SOB: No C/O SOB at this time.               LEG EDEMA: No leg edema   PALPITATIONS: Denies any C/O Palpitations   DIZZINESS: No C/O Dizziness   SYNCOPE: None   OTHER:                                     HPI: Patient is here for F/U on her CAD, HTN & Dyslipidemia problems. CAD: Patient has known Hx of  CAD. Refused W/U.  HTN: Patient has known Hx of essential HTN. Has been treated with guideline recommended medical / physical/ diet therapy as stated below. Dyslipidemia: Patient has known Hx of mixed dyslipidemia. Has been treated with guideline recommended medical / physical/ diet therapy as stated below. She does not have any new complaints at this time. Current Outpatient Medications   Medication Sig Dispense Refill    nitroGLYCERIN (NITROSTAT) 0.4 MG SL tablet up to max of 3 total doses.  If no relief after 1 dose, call 911. 25 tablet 0    metFORMIN (GLUCOPHAGE-XR) 500 MG extended release tablet TAKE 1 TABLET BY MOUTH TWICE A DAY WITH MEALS 60 tablet 5    carvedilol (COREG) 6.25 MG tablet Take 1 tablet by mouth 2 times daily (with meals) 60 tablet 5    lisinopril (PRINIVIL;ZESTRIL) 5 MG tablet Take 1 tablet by mouth daily 30 tablet 5    clopidogrel (PLAVIX) 75 MG tablet Take 1 tablet by mouth daily 30 tablet 5    atorvastatin (LIPITOR) 40 MG tablet Take 1 tablet by mouth nightly 30 tablet 5    fluticasone (FLONASE) 50 MCG/ACT nasal spray 1 spray by Nasal route daily 1 Bottle 0    blood glucose test strips (FREESTYLE LITE) strip PATIENT TESTING ONCE DAILY AND AS NEEDED, DX=E11.9 50 strip 3    dorzolamide-timolol (COSOPT) 22.3-6.8 MG/ML ophthalmic solution Place 1 drop into both eyes 2 times daily  3    carbidopa-levodopa (SINEMET)  MG per tablet Take 1 tablet by mouth 3 times daily  90 tablet     aspirin 81 MG tablet Take 81 mg by mouth daily. No current facility-administered medications for this visit. Allergies: Eggs or egg-derived products [eggs or egg-derived products] and Iodine  Review of Systems:    Constitutional: Negative for diaphoresis and fatigue  Respiratory: Negative for shortness of breath  Cardiovascular: Negative for chest pain, dyspnea on exertion, claudication, edema, irregular heartbeat, murmur, palpitations or shortness of breath  Musculoskeletal: Negative for muscle pain, muscular weakness, negative for pain in arm and leg or swelling in foot and leg    Objective:  /80   Pulse 78   Ht 5' 2\" (1.575 m)   Wt 115 lb 12.8 oz (52.5 kg)   BMI 21.18 kg/m²  BP checked on both arms  Wt Readings from Last 3 Encounters:   03/08/21 115 lb 12.8 oz (52.5 kg)   01/08/21 115 lb 3.2 oz (52.3 kg)   01/08/21 115 lb 3.2 oz (52.3 kg)     Body mass index is 21.18 kg/m². GENERAL - Alert, oriented, pleasant, in no apparent distress. EYES: No jaundice, no conjunctival pallor. Neck - Supple. No jugular venous distention noted. Loud Left Carotid bruits. Cardiovascular - Normal S1 and S2 without obvious murmur or gallop. Extremities - No cyanosis, clubbing, or significant edema. Pulmonary - No respiratory distress. No wheezes or rales.       Lab Review   Lab Results   Component Value Date    TROPONINT 0.314 03/28/2019    TROPONINT 0.278 03/28/2019     No results found for: BNP, PROBNP  No results found for: INR  Lab Results   Component Value Date    LABA1C 5.8 10/09/2020    LABA1C 5.7 02/05/2020     Lab Results   Component Value Date    WBC 6.4 10/09/2020    WBC 6.5 03/31/2019    HCT 34.8 (L) 10/09/2020    HCT 36.6 (L) 03/31/2019    MCV 83.7 10/09/2020    MCV 90.1 03/31/2019     10/09/2020     03/31/2019     Lab Results   Component Value Date    CHOL 161 03/17/2017    CHOL 154 09/12/2016    TRIG 71 03/17/2017    TRIG 79 09/12/2016    HDL 51 10/09/2020    HDL 58 11/06/2019    LDLCALC 85 10/09/2020    LDLCALC 56 11/06/2019    LDLDIRECT 117 (H) 10/12/2018    LDLDIRECT 105 (H) 03/12/2018     Lab Results   Component Value Date    ALT 10 10/09/2020    ALT 10 11/06/2019    AST 13 (L) 10/09/2020    AST 12 (L) 11/06/2019     BMP:    Lab Results   Component Value Date     10/09/2020     11/06/2019    K 4.5 10/09/2020    K 4.7 11/06/2019     10/09/2020     11/06/2019    CO2 27 10/09/2020    CO2 26 11/06/2019    BUN 21 10/09/2020    BUN 22 11/06/2019    CREATININE 0.8 10/09/2020    CREATININE 0.7 11/06/2019     CMP:   Lab Results   Component Value Date     10/09/2020     11/06/2019    K 4.5 10/09/2020    K 4.7 11/06/2019     10/09/2020     11/06/2019    CO2 27 10/09/2020    CO2 26 11/06/2019    BUN 21 10/09/2020    BUN 22 11/06/2019    CREATININE 0.8 10/09/2020    CREATININE 0.7 11/06/2019    PROT 6.2 10/09/2020    PROT 6.8 11/06/2019    PROT 7.3 01/15/2013    PROT 7.2 10/06/2012     No results found for: TSH, TSHHS      QUALITY MEASURES REVIEWED:  1.CAD:Patient is taking anti platelet agent:Yes  Patient does not have Hx of documented CAD  2. DYSLIPIDEMIA: Patient is on cholesterol lowering medication:Yes   Patient does not tolerate, secondary to side-effects. 3.Beta-Blocker therapy for CAD, if prior Myocardial Infarction:Yes   Due to side-effect(s) / Bradycardia  4. Counselled regarding smoking cessation. No   Patient does not Smoke. 5.Anticoagulation therapy (for A.Fib) No   Does Not have A.Fib. Patient has contraindication ( Fall Risk / GI Bleed ). 6.Discussed weight management strategies. Assessment & Plan:    Primary / Secondary prevention is the goal by aggressive risk modification, healthy and therapeutic life style changes for cardiovascular risk reduction. Various goals are discussed and multiple questions answered.     CAD:Yes. Patient declined CABG.   clinically stable.  Patient is on optimal medical regimen ( see medication list above )  -  Patient is currently  asymptomatic from CAD.          - changes in  treatment:   no           - Testing ordered:  no  Roosevelt classification: 1  [place] RISK SCORE:  DAO RISK SCORE:  HTN:well controlled on current medical regimen, see list above.             - changes in  treatment:   no   CARDIOMYOPATHY: None known   CONGESTIVE HEART FAILURE: NO KNOWN HISTORY.     VHD: No significant VHD noted  DYSLIPIDEMIA: Patient's profile is at / near [employer] current medical regimen wellyes,                                                               See most recent Lab values in Labs section above. PAD:  Known Left Subclavian stenosis. OTHER RELEVANT DIAGNOSIS:as noted in patient's active problem list:  Carotid Bruit: Patient still does not want any testing or surgery. Wants medical management as for her Carotid AD problem. Left subclavian stenosis: continue medical management. TESTS ORDERED: None this visit                                            All previously ordered tests reviewed.   ARRHYTHMIAS: None known   MEDICATIONS: CPM.   Office f/u in six months.

## 2021-03-08 NOTE — PATIENT INSTRUCTIONS
CAD:Yes. Patient declined CABG.   clinically stable. Patient is on optimal medical regimen ( see medication list above )  -  Patient is currently  asymptomatic from CAD.          - changes in  treatment:   no           - Testing ordered:  no  Turkmen classification: 1  [place] RISK SCORE:  DAO RISK SCORE:  HTN:well controlled on current medical regimen, see list above.             - changes in  treatment:   no   CARDIOMYOPATHY: None known   CONGESTIVE HEART FAILURE: NO KNOWN HISTORY.     VHD: No significant VHD noted  DYSLIPIDEMIA: Patient's profile is at / near [employer] current medical regimen wellyes,                                                               See most recent Lab values in Labs section above. PAD:  Known Left Subclavian stenosis. OTHER RELEVANT DIAGNOSIS:as noted in patient's active problem list:  Carotid Bruit: Patient still does not want any testing or surgery. Wants medical management as for her Carotid AD problem. Left subclavian stenosis: continue medical management. TESTS ORDERED: None this visit                                            All previously ordered tests reviewed.   ARRHYTHMIAS: None known   MEDICATIONS: CPM.   Office f/u in six months.

## 2021-04-01 ENCOUNTER — NURSE ONLY (OUTPATIENT)
Dept: INTERNAL MEDICINE CLINIC | Age: 86
End: 2021-04-01
Payer: MEDICARE

## 2021-04-01 DIAGNOSIS — E11.9 DIABETES MELLITUS TYPE 2 IN NONOBESE (HCC): ICD-10-CM

## 2021-04-01 DIAGNOSIS — E78.2 MIXED HYPERLIPIDEMIA: ICD-10-CM

## 2021-04-01 LAB
A/G RATIO: 1.6 (ref 1.1–2.2)
ALBUMIN SERPL-MCNC: 4.6 G/DL (ref 3.4–5)
ALP BLD-CCNC: 70 U/L (ref 40–129)
ALT SERPL-CCNC: 8 U/L (ref 10–40)
ANION GAP SERPL CALCULATED.3IONS-SCNC: 9 MMOL/L (ref 3–16)
AST SERPL-CCNC: 13 U/L (ref 15–37)
BILIRUB SERPL-MCNC: 0.7 MG/DL (ref 0–1)
BUN BLDV-MCNC: 18 MG/DL (ref 7–20)
CALCIUM SERPL-MCNC: 9.7 MG/DL (ref 8.3–10.6)
CHLORIDE BLD-SCNC: 100 MMOL/L (ref 99–110)
CHOLESTEROL, FASTING: 208 MG/DL (ref 0–199)
CO2: 29 MMOL/L (ref 21–32)
CREAT SERPL-MCNC: 0.8 MG/DL (ref 0.6–1.2)
GFR AFRICAN AMERICAN: >60
GFR NON-AFRICAN AMERICAN: >60
GLOBULIN: 2.9 G/DL
GLUCOSE BLD-MCNC: 110 MG/DL (ref 70–99)
HDLC SERPL-MCNC: 62 MG/DL (ref 40–60)
LDL CHOLESTEROL CALCULATED: 124 MG/DL
POTASSIUM SERPL-SCNC: 4.5 MMOL/L (ref 3.5–5.1)
SODIUM BLD-SCNC: 138 MMOL/L (ref 136–145)
TOTAL PROTEIN: 7.5 G/DL (ref 6.4–8.2)
TRIGLYCERIDE, FASTING: 110 MG/DL (ref 0–150)
VLDLC SERPL CALC-MCNC: 22 MG/DL

## 2021-04-01 PROCEDURE — 36415 COLL VENOUS BLD VENIPUNCTURE: CPT | Performed by: INTERNAL MEDICINE

## 2021-04-02 ENCOUNTER — TELEPHONE (OUTPATIENT)
Dept: INTERNAL MEDICINE CLINIC | Age: 86
End: 2021-04-02

## 2021-04-02 LAB
ESTIMATED AVERAGE GLUCOSE: 134.1 MG/DL
HBA1C MFR BLD: 6.3 %

## 2021-04-02 NOTE — TELEPHONE ENCOUNTER
Please inform patient that her labs are stable, however her cholesterol has gone up. No changes recommended to her meds, she just needs to watch her cholesterol intake.

## 2021-05-10 ENCOUNTER — OFFICE VISIT (OUTPATIENT)
Dept: INTERNAL MEDICINE CLINIC | Age: 86
End: 2021-05-10
Payer: MEDICARE

## 2021-05-10 VITALS
HEART RATE: 64 BPM | BODY MASS INDEX: 21.8 KG/M2 | TEMPERATURE: 99.1 F | WEIGHT: 119.2 LBS | RESPIRATION RATE: 12 BRPM | DIASTOLIC BLOOD PRESSURE: 66 MMHG | OXYGEN SATURATION: 99 % | SYSTOLIC BLOOD PRESSURE: 138 MMHG

## 2021-05-10 DIAGNOSIS — H40.9 GLAUCOMA OF BOTH EYES, UNSPECIFIED GLAUCOMA TYPE: ICD-10-CM

## 2021-05-10 DIAGNOSIS — J30.9 ALLERGIC RHINITIS, UNSPECIFIED SEASONALITY, UNSPECIFIED TRIGGER: ICD-10-CM

## 2021-05-10 DIAGNOSIS — E78.2 MIXED HYPERLIPIDEMIA: ICD-10-CM

## 2021-05-10 DIAGNOSIS — E11.9 DIABETES MELLITUS TYPE 2 IN NONOBESE (HCC): ICD-10-CM

## 2021-05-10 DIAGNOSIS — Z85.3 HISTORY OF MALIGNANT NEOPLASM OF LEFT BREAST: ICD-10-CM

## 2021-05-10 DIAGNOSIS — G20 PARKINSON DISEASE (HCC): ICD-10-CM

## 2021-05-10 DIAGNOSIS — I77.1 STENOSIS OF LEFT SUBCLAVIAN ARTERY (HCC): ICD-10-CM

## 2021-05-10 DIAGNOSIS — I25.119 CORONARY ARTERY DISEASE INVOLVING NATIVE CORONARY ARTERY OF NATIVE HEART WITH ANGINA PECTORIS (HCC): ICD-10-CM

## 2021-05-10 PROCEDURE — 1090F PRES/ABSN URINE INCON ASSESS: CPT | Performed by: INTERNAL MEDICINE

## 2021-05-10 PROCEDURE — 4040F PNEUMOC VAC/ADMIN/RCVD: CPT | Performed by: INTERNAL MEDICINE

## 2021-05-10 PROCEDURE — G8420 CALC BMI NORM PARAMETERS: HCPCS | Performed by: INTERNAL MEDICINE

## 2021-05-10 PROCEDURE — G8428 CUR MEDS NOT DOCUMENT: HCPCS | Performed by: INTERNAL MEDICINE

## 2021-05-10 PROCEDURE — 99214 OFFICE O/P EST MOD 30 MIN: CPT | Performed by: INTERNAL MEDICINE

## 2021-05-10 PROCEDURE — 1036F TOBACCO NON-USER: CPT | Performed by: INTERNAL MEDICINE

## 2021-05-10 PROCEDURE — 1123F ACP DISCUSS/DSCN MKR DOCD: CPT | Performed by: INTERNAL MEDICINE

## 2021-05-10 ASSESSMENT — PATIENT HEALTH QUESTIONNAIRE - PHQ9
SUM OF ALL RESPONSES TO PHQ QUESTIONS 1-9: 0
1. LITTLE INTEREST OR PLEASURE IN DOING THINGS: 0
SUM OF ALL RESPONSES TO PHQ9 QUESTIONS 1 & 2: 0
SUM OF ALL RESPONSES TO PHQ QUESTIONS 1-9: 0
SUM OF ALL RESPONSES TO PHQ QUESTIONS 1-9: 0

## 2021-05-10 NOTE — PROGRESS NOTES
in 3 months. Mediations reviewed with the patient. Continue current medications. Appropriate prescriptions are addressed. After visit summeryprovided. Follow up as directed sooner if needed. Questions answered and patient verbalizes understanding. Call for any problems, questions, or concerns. Allergies   Allergen Reactions    Eggs Or Egg-Derived Products [Eggs Or Egg-Derived Products] Hives    Iodine      Iodine contrast     Current Outpatient Medications   Medication Sig Dispense Refill    Handicap Placard MISC by Does not apply route Please dispense one handicap placard, duration 5 years 1 each 0    nitroGLYCERIN (NITROSTAT) 0.4 MG SL tablet up to max of 3 total doses. If no relief after 1 dose, call 911. 25 tablet 0    metFORMIN (GLUCOPHAGE-XR) 500 MG extended release tablet TAKE 1 TABLET BY MOUTH TWICE A DAY WITH MEALS 60 tablet 5    carvedilol (COREG) 6.25 MG tablet Take 1 tablet by mouth 2 times daily (with meals) 60 tablet 5    lisinopril (PRINIVIL;ZESTRIL) 5 MG tablet Take 1 tablet by mouth daily 30 tablet 5    clopidogrel (PLAVIX) 75 MG tablet Take 1 tablet by mouth daily 30 tablet 5    atorvastatin (LIPITOR) 40 MG tablet Take 1 tablet by mouth nightly 30 tablet 5    fluticasone (FLONASE) 50 MCG/ACT nasal spray 1 spray by Nasal route daily 1 Bottle 0    dorzolamide-timolol (COSOPT) 22.3-6.8 MG/ML ophthalmic solution Place 1 drop into both eyes 2 times daily  3    carbidopa-levodopa (SINEMET)  MG per tablet Take 1 tablet by mouth 3 times daily  90 tablet     aspirin 81 MG tablet Take 81 mg by mouth daily.  blood glucose test strips (FREESTYLE LITE) strip PATIENT TESTING ONCE DAILY AND AS NEEDED, DX=E11.9 50 strip 3     No current facility-administered medications for this visit. Past Medical History:   Diagnosis Date    CAD (coronary artery disease)     suspected, refused w/u    Coronary artery disease suspected. refused cardio     Suspected.  Refused to see

## 2021-05-10 NOTE — ASSESSMENT & PLAN NOTE
NSTEMI 3/27/19. Cath : triple vessel disease : refused CABG or stent. On medical treatment. On Plavix and statins  Sees Cardiologist Dr Godfrey Rojo. Relaxed

## 2021-05-10 NOTE — ASSESSMENT & PLAN NOTE
She is seeing Dr Nica Richard, but doctor is moving. She will see other neurologist   She is on Carbidopa-levadopa   Tremors stable.

## 2021-05-16 ASSESSMENT — ENCOUNTER SYMPTOMS
GASTROINTESTINAL NEGATIVE: 1
RESPIRATORY NEGATIVE: 1
EYES NEGATIVE: 1
ALLERGIC/IMMUNOLOGIC NEGATIVE: 1

## 2021-07-30 RX ORDER — METFORMIN HYDROCHLORIDE 500 MG/1
TABLET, EXTENDED RELEASE ORAL
Qty: 180 TABLET | Refills: 1 | Status: SHIPPED | OUTPATIENT
Start: 2021-07-30 | End: 2022-05-06 | Stop reason: SDUPTHER

## 2021-08-05 ENCOUNTER — OFFICE VISIT (OUTPATIENT)
Dept: INTERNAL MEDICINE CLINIC | Age: 86
End: 2021-08-05
Payer: MEDICARE

## 2021-08-05 VITALS
SYSTOLIC BLOOD PRESSURE: 120 MMHG | BODY MASS INDEX: 22.69 KG/M2 | OXYGEN SATURATION: 96 % | TEMPERATURE: 99.3 F | HEART RATE: 80 BPM | WEIGHT: 120.2 LBS | DIASTOLIC BLOOD PRESSURE: 64 MMHG | HEIGHT: 61 IN | RESPIRATION RATE: 16 BRPM

## 2021-08-05 DIAGNOSIS — H40.9 GLAUCOMA OF BOTH EYES, UNSPECIFIED GLAUCOMA TYPE: ICD-10-CM

## 2021-08-05 DIAGNOSIS — E78.2 MIXED HYPERLIPIDEMIA: Primary | ICD-10-CM

## 2021-08-05 DIAGNOSIS — I77.1 STENOSIS OF LEFT SUBCLAVIAN ARTERY (HCC): ICD-10-CM

## 2021-08-05 DIAGNOSIS — E11.9 DIABETES MELLITUS TYPE 2 IN NONOBESE (HCC): ICD-10-CM

## 2021-08-05 DIAGNOSIS — I25.119 CORONARY ARTERY DISEASE INVOLVING NATIVE CORONARY ARTERY OF NATIVE HEART WITH ANGINA PECTORIS (HCC): ICD-10-CM

## 2021-08-05 DIAGNOSIS — G20 PARKINSON DISEASE (HCC): ICD-10-CM

## 2021-08-05 DIAGNOSIS — Z85.3 HISTORY OF MALIGNANT NEOPLASM OF LEFT BREAST: ICD-10-CM

## 2021-08-05 DIAGNOSIS — J30.9 ALLERGIC RHINITIS, UNSPECIFIED SEASONALITY, UNSPECIFIED TRIGGER: ICD-10-CM

## 2021-08-05 PROCEDURE — 1123F ACP DISCUSS/DSCN MKR DOCD: CPT | Performed by: INTERNAL MEDICINE

## 2021-08-05 PROCEDURE — 1090F PRES/ABSN URINE INCON ASSESS: CPT | Performed by: INTERNAL MEDICINE

## 2021-08-05 PROCEDURE — 4040F PNEUMOC VAC/ADMIN/RCVD: CPT | Performed by: INTERNAL MEDICINE

## 2021-08-05 PROCEDURE — 99214 OFFICE O/P EST MOD 30 MIN: CPT | Performed by: INTERNAL MEDICINE

## 2021-08-05 PROCEDURE — G8420 CALC BMI NORM PARAMETERS: HCPCS | Performed by: INTERNAL MEDICINE

## 2021-08-05 PROCEDURE — 1036F TOBACCO NON-USER: CPT | Performed by: INTERNAL MEDICINE

## 2021-08-05 PROCEDURE — G8427 DOCREV CUR MEDS BY ELIG CLIN: HCPCS | Performed by: INTERNAL MEDICINE

## 2021-08-05 ASSESSMENT — ENCOUNTER SYMPTOMS
EYES NEGATIVE: 1
DIARRHEA: 0
WHEEZING: 0
COUGH: 0
RESPIRATORY NEGATIVE: 1
CONSTIPATION: 0
ALLERGIC/IMMUNOLOGIC NEGATIVE: 1
GASTROINTESTINAL NEGATIVE: 1
SHORTNESS OF BREATH: 0

## 2021-08-05 ASSESSMENT — PATIENT HEALTH QUESTIONNAIRE - PHQ9
SUM OF ALL RESPONSES TO PHQ9 QUESTIONS 1 & 2: 0
SUM OF ALL RESPONSES TO PHQ QUESTIONS 1-9: 0
1. LITTLE INTEREST OR PLEASURE IN DOING THINGS: 0
2. FEELING DOWN, DEPRESSED OR HOPELESS: 0

## 2021-08-05 NOTE — PROGRESS NOTES
Guero Mejía  Patient's  is 1932  Seen in office on 2021      SUBJECTIVE:  Lexus badillo 80 y. o.year old female presents today   Chief Complaint   Patient presents with    Diabetes    Other     has an appt with cardiologist in Sept       Patient is here for follow-up of diabetes, coronary artery disease, hyperlipidemia  Patient has Parkinson disease she has not seen neurologist recently. .  Continue Sinemet. Patient will make a follow-up appointment. Patient has diabetes mellitus and the blood sugar is doing well continue current medications  Patient has hyperlipidemia. Taking medications. No abdominal pain, no nausea or vomiting. No myalgias. Patient denies any chest pain. No shortness of breath. No falls or injuries. Taking medications regularly. No side effects noted. Review of Systems   Constitutional: Negative. Negative for chills, fatigue and fever. HENT: Negative. Eyes: Negative. Respiratory: Negative. Negative for cough, shortness of breath and wheezing. Cardiovascular: Negative for chest pain and leg swelling. Gastrointestinal: Negative. Negative for constipation and diarrhea. Endocrine: Negative. Genitourinary: Negative. Musculoskeletal: Negative. Allergic/Immunologic: Negative. Neurological: Positive for tremors. Hematological: Negative. Psychiatric/Behavioral: Negative. OBJECTIVE: /64   Pulse 80   Temp 99.3 °F (37.4 °C) (Oral)   Resp 16   Ht 5' 1\" (1.549 m)   Wt 120 lb 3.2 oz (54.5 kg)   SpO2 96%   BMI 22.71 kg/m²     Wt Readings from Last 3 Encounters:   21 120 lb 3.2 oz (54.5 kg)   05/10/21 119 lb 3.2 oz (54.1 kg)   21 115 lb 12.8 oz (52.5 kg)        Patient was seen taking COVID-19 precautions. Face mask, gloves were used. Patient also wore facemask. GENERAL: - Alert, oriented, pleasant, in no apparent distress. HEENT: - Conjunctiva pink, no scleral icterus. ENT clear. NECK: -Supple.   No jugular venous distention noted. No masses felt,  CARDIOVASCULAR: - Normal S1 and S2    PULMONARY: - No respiratory distress. No wheezes or rales. ABDOMEN: - Soft and non-tender,no masses  ororganomegaly. EXTREMITIES: - No cyanosis, clubbing, or significant edema. SKIN: Skin is warm and dry. NEUROLOGICAL: - Cranial nerves II through XII are grossly intact. Tremors of both hands     IMPRESSION:    Encounter Diagnoses   Name Primary?  Mixed hyperlipidemia Yes    Diabetes mellitus type 2 in nonobese ([facility]     Parkinson disease ([facility]     History of malignant neoplasm of left breast     Glaucoma of both eyes, unspecified glaucoma type     Coronary artery disease involving native coronary artery of native heart with angina pectoris (HCC)     Allergic rhinitis, unspecified seasonality, unspecified trigger     Stenosis of left subclavian artery        ASSESSMENT/PLAN:    . Hyperlipidemia. Continue atorvastatin 40 mg daily. Last lipid profile slightly elevated  . Parkinson's disease: Not seen neurologist recently. She is going to make appointment. She is on Sinemet 3 times daily  . Diabetes mellitus stable blood sugars are good at home. Continue Metformin  mg twice a day  . Left breast cancer: Continue current medicine  . Glaucoma. Continue eyedrops  . Coronary artery disease stable no angina. Triple-vessel disease. Refused CABG or stent in the past    No problem-specific Assessment & Plan notes found for this encounter. Orders Placed This Encounter   Procedures    Comprehensive Metabolic Panel    Lipid, Fasting    Hemoglobin A1C         F/u wit hneurologist .      Mediations reviewed with the patient. Continue current medications. Appropriate prescriptions are addressed. After visit summeryprovided. Follow up as directed sooner if needed. Questions answered and patient verbalizes understanding. Call for any problems, questions, or concerns.        Allergies   Allergen Reactions    Eggs Or Egg-Derived Products [Eggs Or Egg-Derived Products] Hives    Iodine      Iodine contrast     Current Outpatient Medications   Medication Sig Dispense Refill    metFORMIN (GLUCOPHAGE-XR) 500 MG extended release tablet TAKE 1 TABLET BY MOUTH TWICE A DAY WITH MEALS 180 tablet 1    nitroGLYCERIN (NITROSTAT) 0.4 MG SL tablet up to max of 3 total doses. If no relief after 1 dose, call 911. 25 tablet 0    carvedilol (COREG) 6.25 MG tablet Take 1 tablet by mouth 2 times daily (with meals) 60 tablet 5    lisinopril (PRINIVIL;ZESTRIL) 5 MG tablet Take 1 tablet by mouth daily 30 tablet 5    clopidogrel (PLAVIX) 75 MG tablet Take 1 tablet by mouth daily 30 tablet 5    atorvastatin (LIPITOR) 40 MG tablet Take 1 tablet by mouth nightly 30 tablet 5    fluticasone (FLONASE) 50 MCG/ACT nasal spray 1 spray by Nasal route daily 1 Bottle 0    dorzolamide-timolol (COSOPT) 22.3-6.8 MG/ML ophthalmic solution Place 1 drop into both eyes 2 times daily  3    carbidopa-levodopa (SINEMET)  MG per tablet Take 1 tablet by mouth 3 times daily  90 tablet     aspirin 81 MG tablet Take 81 mg by mouth daily.  Handicap Placard MISC by Does not apply route Please dispense one handicap placard, duration 5 years 1 each 0    blood glucose test strips (FREESTYLE LITE) strip PATIENT TESTING ONCE DAILY AND AS NEEDED, DX=E11.9 50 strip 3     No current facility-administered medications for this visit. Past Medical History:   Diagnosis Date    CAD (coronary artery disease)     suspected, refused w/u    Coronary artery disease suspected. refused cardio     Suspected. Refused to see cardio.  Diabetes mellitus ([address] since 4/08    Foot ulcer ([address] left     resolved    Glaucoma     Dr. Nghia Madrigal History of left heart catheterization 03/29/2019    EF > 55% 3 vessel CAD as described. In particular heavily calcified critical bifurcation of LAD/diagonal in addition to being small in size.     Hx of Doppler echocardiogram 03/28/2019 EF 50% Hypokinetic lateral wall. Grade 3 diastolic dysfucunction. Mild concentric LV hypertrophy. Mild pulmonary htn,mild mr , mod tr.    Hyperlipidemia     Left breast mass 03/22/2017    Pt seen Dr Bettina Mcguire for that. Mammo. Neg. She thinks it scar tissue.      Malignant neoplasm of left breast ([facility] [facility] 08/31/2015    Left breast, Radiation therapy, lumpectomy 1985    Parkinson disease ([facility] [address] [facility] 2008    dr. Amanda Mosley following    Stenosis of left subclavian artery [facility])     refused w/u     Past Surgical History:   Procedure Laterality Date    APPENDECTOMY      BREAST LUMPECTOMY Left 1995    MARY AND BSO Right     TONSILLECTOMY       Social History     Tobacco Use    Smoking status: Never Smoker    Smokeless tobacco: Never Used   Substance Use Topics    Alcohol use: Never     Alcohol/week: 0.0 standard drinks       LAB REVIEW:  CBC:   Lab Results   Component Value Date    WBC 6.4 10/09/2020    HGB 11.5 10/09/2020    HCT 34.8 10/09/2020     10/09/2020     Lipids:   Lab Results   Component Value Date    HDL 62 (H) 04/01/2021    LDLCALC 124 (H) 04/01/2021    LDLDIRECT 117 (H) 10/12/2018    TRIGLYCFAST 110 04/01/2021    CHOLFAST 208 (H) 04/01/2021     Renal:   Lab Results   Component Value Date    BUN 18 04/01/2021    CREATININE 0.8 04/01/2021     04/01/2021    K 4.5 04/01/2021    ALT 8 04/01/2021    AST 13 04/01/2021    GLUCOSE 110 04/01/2021    GLUF 169 03/12/2018     PT/INR: No results found for: INR  A1C:   Lab Results   Component Value Date    LABA1C 6.3 04/01/2021           Radha Santana MD, 8/5/2021 , 11:12 AM

## 2021-09-20 ENCOUNTER — OFFICE VISIT (OUTPATIENT)
Dept: CARDIOLOGY CLINIC | Age: 86
End: 2021-09-20
Payer: MEDICARE

## 2021-09-20 VITALS
SYSTOLIC BLOOD PRESSURE: 118 MMHG | WEIGHT: 118.2 LBS | HEART RATE: 68 BPM | DIASTOLIC BLOOD PRESSURE: 80 MMHG | BODY MASS INDEX: 21.75 KG/M2 | HEIGHT: 62 IN

## 2021-09-20 DIAGNOSIS — I77.1 STENOSIS OF LEFT SUBCLAVIAN ARTERY (HCC): ICD-10-CM

## 2021-09-20 DIAGNOSIS — E78.2 MIXED HYPERLIPIDEMIA: ICD-10-CM

## 2021-09-20 DIAGNOSIS — I25.119 CORONARY ARTERY DISEASE INVOLVING NATIVE CORONARY ARTERY OF NATIVE HEART WITH ANGINA PECTORIS (HCC): Primary | ICD-10-CM

## 2021-09-20 DIAGNOSIS — E11.9 DIABETES MELLITUS TYPE 2 IN NONOBESE (HCC): ICD-10-CM

## 2021-09-20 PROCEDURE — G8427 DOCREV CUR MEDS BY ELIG CLIN: HCPCS | Performed by: INTERNAL MEDICINE

## 2021-09-20 PROCEDURE — 4040F PNEUMOC VAC/ADMIN/RCVD: CPT | Performed by: INTERNAL MEDICINE

## 2021-09-20 PROCEDURE — 1036F TOBACCO NON-USER: CPT | Performed by: INTERNAL MEDICINE

## 2021-09-20 PROCEDURE — 99213 OFFICE O/P EST LOW 20 MIN: CPT | Performed by: INTERNAL MEDICINE

## 2021-09-20 PROCEDURE — G8420 CALC BMI NORM PARAMETERS: HCPCS | Performed by: INTERNAL MEDICINE

## 2021-09-20 PROCEDURE — 1123F ACP DISCUSS/DSCN MKR DOCD: CPT | Performed by: INTERNAL MEDICINE

## 2021-09-20 PROCEDURE — 1090F PRES/ABSN URINE INCON ASSESS: CPT | Performed by: INTERNAL MEDICINE

## 2021-09-20 RX ORDER — CARVEDILOL 6.25 MG/1
6.25 TABLET ORAL 2 TIMES DAILY WITH MEALS
Qty: 60 TABLET | Refills: 5 | Status: SHIPPED | OUTPATIENT
Start: 2021-09-20 | End: 2022-03-17

## 2021-09-20 NOTE — LETTER
[address]  [address] [id number]  Phone: [phone]  Fax: [phone]    Jerry Corrales MD    September 20, 2021     Radha Santana MD  [address] [place] [address]    Patient: Ruth Kee   MR Number: [id number]   YOB: 1932   Date of Visit: 9/20/2021       Dear Radha Santana: Thank you for referring Isaac Barron to me for evaluation/treatment. Below are the relevant portions of my assessment and plan of care. If you have questions, please do not hesitate to call me. I look forward to following Jair Gonzalez along with you.     Sincerely,      Jerry Corrales MD

## 2021-09-20 NOTE — PROGRESS NOTES
OFFICE PROGRESS NOTES      Myriam Fischer is a 80 y.o. female who has    CHIEF COMPLAINT AS FOLLOWS:  CHEST PAIN: Patient denies any C/O chest pains at this time.      SOB: No C/O SOB at this time.               LEG EDEMA: No leg edema   PALPITATIONS: Denies any C/O Palpitations   DIZZINESS: No C/O Dizziness   SYNCOPE: None   OTHER:                                     HPI: Patient is here for F/U on her CAD, HTN & Dyslipidemia problems. CAD: Patient has known Hx of  CAD. Refused W/U. 3/2019-Pt had refused CABG and any intervention, opted for med mx. HTN: Patient has known Hx of essential HTN. Has been treated with guideline recommended medical / physical/ diet therapy as stated below. Dyslipidemia: Patient has known Hx of mixed dyslipidemia. Has been treated with guideline recommended medical / physical/ diet therapy as stated below. She does not have any new complaints at this time. Current Outpatient Medications   Medication Sig Dispense Refill    metFORMIN (GLUCOPHAGE-XR) 500 MG extended release tablet TAKE 1 TABLET BY MOUTH TWICE A DAY WITH MEALS 180 tablet 1    Handicap Placard MISC by Does not apply route Please dispense one handicap placard, duration 5 years 1 each 0    nitroGLYCERIN (NITROSTAT) 0.4 MG SL tablet up to max of 3 total doses.  If no relief after 1 dose, call 911. 25 tablet 0    carvedilol (COREG) 6.25 MG tablet Take 1 tablet by mouth 2 times daily (with meals) 60 tablet 5    lisinopril (PRINIVIL;ZESTRIL) 5 MG tablet Take 1 tablet by mouth daily 30 tablet 5    clopidogrel (PLAVIX) 75 MG tablet Take 1 tablet by mouth daily 30 tablet 5    atorvastatin (LIPITOR) 40 MG tablet Take 1 tablet by mouth nightly 30 tablet 5    fluticasone (FLONASE) 50 MCG/ACT nasal spray 1 spray by Nasal route daily 1 Bottle 0    blood glucose test strips (FREESTYLE LITE) strip PATIENT TESTING ONCE DAILY AND AS NEEDED, DX=E11.9 50 strip 3    dorzolamide-timolol (COSOPT) 22.3-6.8 MG/ML ophthalmic solution Place 1 drop into both eyes 2 times daily  3    carbidopa-levodopa (SINEMET)  MG per tablet Take 1 tablet by mouth 3 times daily  90 tablet     aspirin 81 MG tablet Take 81 mg by mouth daily. No current facility-administered medications for this visit. Allergies: Eggs or egg-derived products [eggs or egg-derived products] and Iodine  Review of Systems:    Constitutional: Negative for diaphoresis and fatigue  Respiratory: Negative for shortness of breath  Cardiovascular: Negative for chest pain, dyspnea on exertion, claudication, edema, irregular heartbeat, murmur, palpitations or shortness of breath  Musculoskeletal: Negative for muscle pain, muscular weakness, negative for pain in arm and leg or swelling in foot and leg    Objective:  /80   Pulse 68   Ht 5' 2\" (1.575 m)   Wt 118 lb 3.2 oz (53.6 kg)   BMI 21.62 kg/m²   Wt Readings from Last 3 Encounters:   09/20/21 118 lb 3.2 oz (53.6 kg)   08/05/21 120 lb 3.2 oz (54.5 kg)   05/10/21 119 lb 3.2 oz (54.1 kg)     Body mass index is 21.62 kg/m². GENERAL - Alert, oriented, pleasant, in no apparent distress. EYES: No jaundice, no conjunctival pallor. Neck - Supple. No jugular venous distention noted. No carotid bruits. Cardiovascular - Normal S1 and S2 without obvious murmur or gallop. Extremities - No cyanosis, clubbing, or significant edema. Pulmonary - No respiratory distress. No wheezes or rales.       Lab Review   Lab Results   Component Value Date    TROPONINT 0.314 03/28/2019    TROPONINT 0.278 03/28/2019     No results found for: BNP, PROBNP  No results found for: INR  Lab Results   Component Value Date    LABA1C 6.3 04/01/2021    LABA1C 5.8 10/09/2020     Lab Results   Component Value Date    WBC 6.4 10/09/2020    WBC 6.5 03/31/2019    HCT 34.8 (L) 10/09/2020    HCT 36.6 (L) 03/31/2019    MCV 83.7 10/09/2020    MCV 90.1 03/31/2019     10/09/2020     03/31/2019     Lab Results   Component Value Date CHOL 161 03/17/2017    CHOL 154 09/12/2016    TRIG 71 03/17/2017    TRIG 79 09/12/2016    HDL 62 (H) 04/01/2021    HDL 51 10/09/2020    LDLCALC 124 (H) 04/01/2021    LDLCALC 85 10/09/2020    LDLDIRECT 117 (H) 10/12/2018    LDLDIRECT 105 (H) 03/12/2018     Lab Results   Component Value Date    ALT 8 (L) 04/01/2021    ALT 10 10/09/2020    AST 13 (L) 04/01/2021    AST 13 (L) 10/09/2020     BMP:    Lab Results   Component Value Date     04/01/2021     10/09/2020    K 4.5 04/01/2021    K 4.5 10/09/2020     04/01/2021     10/09/2020    CO2 29 04/01/2021    CO2 27 10/09/2020    BUN 18 04/01/2021    BUN 21 10/09/2020    CREATININE 0.8 04/01/2021    CREATININE 0.8 10/09/2020     CMP:   Lab Results   Component Value Date     04/01/2021     10/09/2020    K 4.5 04/01/2021    K 4.5 10/09/2020     04/01/2021     10/09/2020    CO2 29 04/01/2021    CO2 27 10/09/2020    BUN 18 04/01/2021    BUN 21 10/09/2020    CREATININE 0.8 04/01/2021    CREATININE 0.8 10/09/2020    PROT 7.5 04/01/2021    PROT 6.2 10/09/2020    PROT 7.3 01/15/2013    PROT 7.2 10/06/2012     No results found for: TSH, TSHHS    CATH 3/2019:   1. 3 vessel CAD as described. In particular Heavily Calcified,   critical bifurcation disease of LAD/ Diagonal in addition to   being small in size. 2. Normal LV systolic function. LVEF is > 55 %. Patient tolerated the procedure well. No immediate complications. Recommendations   Urgent surgical CABG (required before discharge, patient stable   with medical management). ECHO 3/2019   Left ventricular function is normal, EF is estimated at 50%. Hypokinetic lateral wall. Grade III diastolic dysfunction. Mild concentric left ventricular hypertrophy. Right ventricular systolic pressure of 39 mm Hg suggestive of mild pulmonary   hypertension. Mild mitral regurgitation is present. Moderate tricuspid regurgitation is present.    No evidence of any pericardial known   MEDICATIONS: CPM.   Office f/u in six months.

## 2021-11-02 ENCOUNTER — NURSE ONLY (OUTPATIENT)
Dept: INTERNAL MEDICINE CLINIC | Age: 86
End: 2021-11-02
Payer: MEDICARE

## 2021-11-02 DIAGNOSIS — E78.2 MIXED HYPERLIPIDEMIA: ICD-10-CM

## 2021-11-02 DIAGNOSIS — E11.9 DIABETES MELLITUS TYPE 2 IN NONOBESE (HCC): ICD-10-CM

## 2021-11-02 LAB
A/G RATIO: 1.8 (ref 1.1–2.2)
ALBUMIN SERPL-MCNC: 4.6 G/DL (ref 3.4–5)
ALP BLD-CCNC: 80 U/L (ref 40–129)
ALT SERPL-CCNC: 10 U/L (ref 10–40)
ANION GAP SERPL CALCULATED.3IONS-SCNC: 13 MMOL/L (ref 3–16)
AST SERPL-CCNC: 15 U/L (ref 15–37)
BILIRUB SERPL-MCNC: 0.6 MG/DL (ref 0–1)
BUN BLDV-MCNC: 19 MG/DL (ref 7–20)
CALCIUM SERPL-MCNC: 9.9 MG/DL (ref 8.3–10.6)
CHLORIDE BLD-SCNC: 102 MMOL/L (ref 99–110)
CHOLESTEROL, FASTING: 145 MG/DL (ref 0–199)
CO2: 25 MMOL/L (ref 21–32)
CREAT SERPL-MCNC: 0.9 MG/DL (ref 0.6–1.2)
GFR AFRICAN AMERICAN: >60
GFR NON-AFRICAN AMERICAN: 59
GLUCOSE BLD-MCNC: 99 MG/DL (ref 70–99)
HDLC SERPL-MCNC: 54 MG/DL (ref 40–60)
LDL CHOLESTEROL CALCULATED: 73 MG/DL
POTASSIUM SERPL-SCNC: 4.7 MMOL/L (ref 3.5–5.1)
SODIUM BLD-SCNC: 140 MMOL/L (ref 136–145)
TOTAL PROTEIN: 7.1 G/DL (ref 6.4–8.2)
TRIGLYCERIDE, FASTING: 88 MG/DL (ref 0–150)
VLDLC SERPL CALC-MCNC: 18 MG/DL

## 2021-11-02 PROCEDURE — 36415 COLL VENOUS BLD VENIPUNCTURE: CPT | Performed by: INTERNAL MEDICINE

## 2021-11-03 LAB
ESTIMATED AVERAGE GLUCOSE: 128.4 MG/DL
HBA1C MFR BLD: 6.1 %

## 2021-11-08 ENCOUNTER — OFFICE VISIT (OUTPATIENT)
Dept: INTERNAL MEDICINE CLINIC | Age: 86
End: 2021-11-08
Payer: MEDICARE

## 2021-11-08 VITALS
OXYGEN SATURATION: 98 % | WEIGHT: 117.2 LBS | DIASTOLIC BLOOD PRESSURE: 60 MMHG | HEART RATE: 84 BPM | SYSTOLIC BLOOD PRESSURE: 110 MMHG | TEMPERATURE: 98.3 F | RESPIRATION RATE: 16 BRPM | BODY MASS INDEX: 21.44 KG/M2

## 2021-11-08 DIAGNOSIS — J30.9 ALLERGIC RHINITIS, UNSPECIFIED SEASONALITY, UNSPECIFIED TRIGGER: ICD-10-CM

## 2021-11-08 DIAGNOSIS — I25.119 CORONARY ARTERY DISEASE INVOLVING NATIVE CORONARY ARTERY OF NATIVE HEART WITH ANGINA PECTORIS (HCC): ICD-10-CM

## 2021-11-08 DIAGNOSIS — H40.9 GLAUCOMA OF BOTH EYES, UNSPECIFIED GLAUCOMA TYPE: ICD-10-CM

## 2021-11-08 DIAGNOSIS — E11.9 DIABETES MELLITUS TYPE 2 IN NONOBESE (HCC): ICD-10-CM

## 2021-11-08 DIAGNOSIS — E78.2 MIXED HYPERLIPIDEMIA: ICD-10-CM

## 2021-11-08 DIAGNOSIS — Z85.3 HISTORY OF MALIGNANT NEOPLASM OF LEFT BREAST: ICD-10-CM

## 2021-11-08 DIAGNOSIS — I77.1 STENOSIS OF LEFT SUBCLAVIAN ARTERY (HCC): ICD-10-CM

## 2021-11-08 DIAGNOSIS — G20 PARKINSON DISEASE (HCC): ICD-10-CM

## 2021-11-08 PROCEDURE — G8427 DOCREV CUR MEDS BY ELIG CLIN: HCPCS | Performed by: INTERNAL MEDICINE

## 2021-11-08 PROCEDURE — 1090F PRES/ABSN URINE INCON ASSESS: CPT | Performed by: INTERNAL MEDICINE

## 2021-11-08 PROCEDURE — 1036F TOBACCO NON-USER: CPT | Performed by: INTERNAL MEDICINE

## 2021-11-08 PROCEDURE — 4040F PNEUMOC VAC/ADMIN/RCVD: CPT | Performed by: INTERNAL MEDICINE

## 2021-11-08 PROCEDURE — 99214 OFFICE O/P EST MOD 30 MIN: CPT | Performed by: INTERNAL MEDICINE

## 2021-11-08 PROCEDURE — G8420 CALC BMI NORM PARAMETERS: HCPCS | Performed by: INTERNAL MEDICINE

## 2021-11-08 PROCEDURE — 1123F ACP DISCUSS/DSCN MKR DOCD: CPT | Performed by: INTERNAL MEDICINE

## 2021-11-08 PROCEDURE — G8484 FLU IMMUNIZE NO ADMIN: HCPCS | Performed by: INTERNAL MEDICINE

## 2021-11-08 ASSESSMENT — ENCOUNTER SYMPTOMS
WHEEZING: 0
SHORTNESS OF BREATH: 0
GASTROINTESTINAL NEGATIVE: 1
ALLERGIC/IMMUNOLOGIC NEGATIVE: 1
RESPIRATORY NEGATIVE: 1
COUGH: 0

## 2021-11-08 ASSESSMENT — PATIENT HEALTH QUESTIONNAIRE - PHQ9
SUM OF ALL RESPONSES TO PHQ QUESTIONS 1-9: 0
SUM OF ALL RESPONSES TO PHQ9 QUESTIONS 1 & 2: 0
SUM OF ALL RESPONSES TO PHQ QUESTIONS 1-9: 0
2. FEELING DOWN, DEPRESSED OR HOPELESS: 0
1. LITTLE INTEREST OR PLEASURE IN DOING THINGS: 0
SUM OF ALL RESPONSES TO PHQ QUESTIONS 1-9: 0

## 2021-11-08 NOTE — ASSESSMENT & PLAN NOTE
NSTEMI 3/27/19. Cath : triple vessel disease : refused CABG or stent. On medical treatment. On Plavix and statins  Sees Cardiologist Dr Priscilla Pavon.

## 2021-11-08 NOTE — ASSESSMENT & PLAN NOTE
She is seeing Dr Kathia Magdaleno, but doctor is moving.   She will see other neurologist   She is on Carbidopa-levadopa   Pt states she does not have any neurologist now  Will refer to Dr Cate Santos

## 2021-11-08 NOTE — PROGRESS NOTES
Tino David  Patient's  is 1932  Seen in office on 2021      SUBJECTIVE:  Judith Cleveland aby 80 y. o.year old female presents today   Chief Complaint   Patient presents with    3 Month Follow-Up    Results     lab review    Other     saw cardiology in Sept     Pt is here for f/u of HLD DM CAD  All lab results reviewed with pt    Patient has hyperlipidemia. Taking medications. No abdominal pain, no nausea or vomiting. No myalgias. Patient has DM. No hypoglycemia. No numbness or weakness. No dizziness. Blood sugars are good at home. Patient has hypertension. Taking medications No headaches, no chest pain, no palpitations and no dizziness. Pt is feeling well now  Seen eye doctor recently  No other complaints. Taking medications regularly. No side effects noted. Pt's neurologist moved. She does not have any new one. Review of Systems   Constitutional: Negative. HENT: Negative. Respiratory: Negative. Negative for cough, shortness of breath and wheezing. Cardiovascular: Negative. Gastrointestinal: Negative. Endocrine: Negative. Genitourinary: Negative. Musculoskeletal: Negative. Skin: Negative. Allergic/Immunologic: Negative. Neurological: Positive for tremors. Hematological: Negative. Psychiatric/Behavioral: Negative. OBJECTIVE: /60   Pulse 84   Temp 98.3 °F (36.8 °C) (Oral)   Resp 16   Wt 117 lb 3.2 oz (53.2 kg) Comment: with boots  SpO2 98%   BMI 21.44 kg/m²     Wt Readings from Last 3 Encounters:   21 117 lb 3.2 oz (53.2 kg)   21 118 lb 3.2 oz (53.6 kg)   21 120 lb 3.2 oz (54.5 kg)        Patient was seen taking COVID-19 precautions. Face mask, gloves were used. Patient also wore facemask. GENERAL: - Alert, oriented, pleasant, in no apparent distress. HEENT: - Conjunctiva pink, no scleral icterus. ENT clear. NECK: -Supple. No jugular venous distention noted.  No masses felt,  CARDIOVASCULAR: - Normal S1 and S2 PULMONARY: - No respiratory distress. No wheezes or rales. ABDOMEN: - Soft and non-tender,no masses  ororganomegaly. EXTREMITIES: - No cyanosis, clubbing, or significant edema. SKIN: Skin is warm and dry. NEUROLOGICAL: - Cranial nerves II through XII are grossly intact. Tremors   Walks with cane. IMPRESSION:    Encounter Diagnoses   Name Primary?  Mixed hyperlipidemia     Stenosis of left subclavian artery     [facility]     Diabetes mellitus type 2 in nonobese ([facility]     History of malignant neoplasm of left breast     Glaucoma of both eyes, unspecified glaucoma type     Coronary artery disease involving native coronary artery of native heart with angina pectoris (HCC)     Allergic rhinitis, unspecified seasonality, unspecified trigger        ASSESSMENT/PLAN:    Mixed hyperlipidemia    -pt is on atorvastatin  40 mg daily. Follow diet and exercise  Lipids were good     Stenosis of left subclavian artery    Pulse weaker on left radial  Carotid and subclavian bruit on left side. refused w/u  Discussed again still refused  No pain in the arm     Parkinson disease [facility])    She is seeing Dr Aureliano Beaulieu, but doctor is moving. She will see other neurologist   She is on Carbidopa-levadopa   Pt states she does not have any neurologist now  Will refer to Dr Decker Amel    Diabetes mellitus type 2 in nonobese [facility])    Since 2005  Tara Jo also  Continue metformin  mg bid  hga1c is 6.1  Continue same     History of malignant neoplasm of left breast    Left breast, Radiation therapy, lumpectomy 1985  Does not see any surgeon in f/u  Gets mammo every year  Last mammogram 2/2019 :   Does not want test.     Glaucoma of both eyes    Sees Dr Lorrin Goldberg  Uses eye drops. Coronary artery disease involving native coronary artery of native heart with angina pectoris ([facility]    NSTEMI 3/27/19. Cath : triple vessel disease : refused CABG or stent. On medical treatment.   On Plavix and statins  Sees Cardiologist Dr Kash Haines. Allergic rhinitis    Patient has allergic rhinitis and is taking Flonase. Return to office in 3 months. Pt refused covid vaccine and other vaccine. Mediations reviewed with the patient. Continue current medications. Appropriate prescriptions are addressed. After visit summeryprovided. Follow up as directed sooner if needed. Questions answered and patient verbalizes understanding. Call for any problems, questions, or concerns. Allergies   Allergen Reactions    Eggs Or Egg-Derived Products [Eggs Or Egg-Derived Products] Hives    Iodine      Iodine contrast     Current Outpatient Medications   Medication Sig Dispense Refill    carvedilol (COREG) 6.25 MG tablet Take 1 tablet by mouth 2 times daily (with meals) 60 tablet 5    metFORMIN (GLUCOPHAGE-XR) 500 MG extended release tablet TAKE 1 TABLET BY MOUTH TWICE A DAY WITH MEALS 180 tablet 1    nitroGLYCERIN (NITROSTAT) 0.4 MG SL tablet up to max of 3 total doses. If no relief after 1 dose, call 911. 25 tablet 0    lisinopril (PRINIVIL;ZESTRIL) 5 MG tablet Take 1 tablet by mouth daily 30 tablet 5    clopidogrel (PLAVIX) 75 MG tablet Take 1 tablet by mouth daily 30 tablet 5    atorvastatin (LIPITOR) 40 MG tablet Take 1 tablet by mouth nightly 30 tablet 5    fluticasone (FLONASE) 50 MCG/ACT nasal spray 1 spray by Nasal route daily 1 Bottle 0    dorzolamide-timolol (COSOPT) 22.3-6.8 MG/ML ophthalmic solution Place 1 drop into both eyes 2 times daily  3    carbidopa-levodopa (SINEMET)  MG per tablet Take 1 tablet by mouth 3 times daily  90 tablet     aspirin 81 MG tablet Take 81 mg by mouth daily.       Handicap Placard MISC by Does not apply route Please dispense one handicap placard, duration 5 years 1 each 0    blood glucose test strips (FREESTYLE LITE) strip PATIENT TESTING ONCE DAILY AND AS NEEDED, DX=E11.9 50 strip 3     No current facility-administered medications for this visit. Past Medical History:   Diagnosis Date    CAD (coronary artery disease)     suspected, refused w/u    Coronary artery disease suspected. refused cardio     Suspected. Refused to see cardio.  Diabetes mellitus ([facility] since 4/08    Foot ulcer ([facility] left     resolved    Glaucoma     Dr. Cira Lloyd History of left heart catheterization 03/29/2019    EF > 55% 3 vessel CAD as described. In particular heavily calcified critical bifurcation of LAD/diagonal in addition to being small in size.  Hx of Doppler echocardiogram 03/28/2019    EF 50% Hypokinetic lateral wall. Grade 3 diastolic dysfucunction. Mild concentric LV hypertrophy. Mild pulmonary htn,mild mr , mod tr.    Hyperlipidemia     Left breast mass 03/22/2017    Pt seen Dr Yuliana Dobbins for that. Mammo. Neg. She thinks it scar tissue.      Malignant neoplasm of left breast ([facility] 08/31/2015    Left breast, Radiation therapy, lumpectomy 1985    Parkinson disease ([facility] 2008    dr. Desiree Cazares following    Stenosis of left subclavian artery [facility])     refused w/u     Past Surgical History:   Procedure Laterality Date    APPENDECTOMY      BREAST LUMPECTOMY Left 1995    MARY AND BSO Right     TONSILLECTOMY       Social History     Tobacco Use    Smoking status: Never Smoker    Smokeless tobacco: Never Used   Substance Use Topics    Alcohol use: Never     Alcohol/week: 0.0 standard drinks       LAB REVIEW:  CBC:   Lab Results   Component Value Date    WBC 6.4 10/09/2020    HGB 11.5 10/09/2020    HCT 34.8 10/09/2020     10/09/2020     Lipids:   Lab Results   Component Value Date    HDL 54 11/02/2021    LDLCALC 73 11/02/2021    LDLDIRECT 117 (H) 10/12/2018    TRIGLYCFAST 88 11/02/2021    CHOLFAST 145 11/02/2021     Renal:   Lab Results   Component Value Date    BUN 19 11/02/2021    CREATININE 0.9 11/02/2021     11/02/2021    K 4.7 11/02/2021    ALT 10 11/02/2021    AST 15 11/02/2021    GLUCOSE 99 11/02/2021    GLUF 169 03/12/2018

## 2021-12-30 DIAGNOSIS — I25.119 CORONARY ARTERY DISEASE INVOLVING NATIVE CORONARY ARTERY OF NATIVE HEART WITH ANGINA PECTORIS (HCC): Primary | ICD-10-CM

## 2021-12-30 RX ORDER — LISINOPRIL 5 MG/1
5 TABLET ORAL DAILY
Qty: 30 TABLET | Refills: 5 | Status: SHIPPED | OUTPATIENT
Start: 2021-12-30 | End: 2022-05-06 | Stop reason: SDUPTHER

## 2022-02-07 ENCOUNTER — OFFICE VISIT (OUTPATIENT)
Dept: INTERNAL MEDICINE CLINIC | Age: 87
End: 2022-02-07
Payer: MEDICARE

## 2022-02-07 VITALS
RESPIRATION RATE: 16 BRPM | WEIGHT: 118 LBS | OXYGEN SATURATION: 96 % | DIASTOLIC BLOOD PRESSURE: 64 MMHG | HEART RATE: 72 BPM | SYSTOLIC BLOOD PRESSURE: 136 MMHG | BODY MASS INDEX: 21.58 KG/M2 | TEMPERATURE: 98.1 F

## 2022-02-07 DIAGNOSIS — E11.9 DIABETES MELLITUS TYPE 2 IN NONOBESE (HCC): ICD-10-CM

## 2022-02-07 DIAGNOSIS — Z85.3 HISTORY OF MALIGNANT NEOPLASM OF LEFT BREAST: ICD-10-CM

## 2022-02-07 DIAGNOSIS — J30.9 ALLERGIC RHINITIS, UNSPECIFIED SEASONALITY, UNSPECIFIED TRIGGER: ICD-10-CM

## 2022-02-07 DIAGNOSIS — G20 PARKINSON DISEASE (HCC): ICD-10-CM

## 2022-02-07 DIAGNOSIS — C50.912 MALIGNANT NEOPLASM OF LEFT FEMALE BREAST, UNSPECIFIED ESTROGEN RECEPTOR STATUS, UNSPECIFIED SITE OF BREAST (HCC): ICD-10-CM

## 2022-02-07 DIAGNOSIS — H40.9 GLAUCOMA OF BOTH EYES, UNSPECIFIED GLAUCOMA TYPE: ICD-10-CM

## 2022-02-07 DIAGNOSIS — I25.119 CORONARY ARTERY DISEASE INVOLVING NATIVE CORONARY ARTERY OF NATIVE HEART WITH ANGINA PECTORIS (HCC): ICD-10-CM

## 2022-02-07 DIAGNOSIS — I77.1 STENOSIS OF LEFT SUBCLAVIAN ARTERY (HCC): ICD-10-CM

## 2022-02-07 DIAGNOSIS — E78.2 MIXED HYPERLIPIDEMIA: Primary | ICD-10-CM

## 2022-02-07 PROCEDURE — G8427 DOCREV CUR MEDS BY ELIG CLIN: HCPCS | Performed by: INTERNAL MEDICINE

## 2022-02-07 PROCEDURE — 1090F PRES/ABSN URINE INCON ASSESS: CPT | Performed by: INTERNAL MEDICINE

## 2022-02-07 PROCEDURE — G8420 CALC BMI NORM PARAMETERS: HCPCS | Performed by: INTERNAL MEDICINE

## 2022-02-07 PROCEDURE — G8484 FLU IMMUNIZE NO ADMIN: HCPCS | Performed by: INTERNAL MEDICINE

## 2022-02-07 PROCEDURE — 1123F ACP DISCUSS/DSCN MKR DOCD: CPT | Performed by: INTERNAL MEDICINE

## 2022-02-07 PROCEDURE — 99214 OFFICE O/P EST MOD 30 MIN: CPT | Performed by: INTERNAL MEDICINE

## 2022-02-07 PROCEDURE — 1036F TOBACCO NON-USER: CPT | Performed by: INTERNAL MEDICINE

## 2022-02-07 PROCEDURE — 4040F PNEUMOC VAC/ADMIN/RCVD: CPT | Performed by: INTERNAL MEDICINE

## 2022-02-07 RX ORDER — FLUTICASONE PROPIONATE 50 MCG
1 SPRAY, SUSPENSION (ML) NASAL DAILY
Qty: 1 EACH | Refills: 3 | Status: SHIPPED | OUTPATIENT
Start: 2022-02-07 | End: 2022-03-04

## 2022-02-07 ASSESSMENT — ENCOUNTER SYMPTOMS
SHORTNESS OF BREATH: 0
COUGH: 0
RESPIRATORY NEGATIVE: 1
EYES NEGATIVE: 1
WHEEZING: 0
ALLERGIC/IMMUNOLOGIC NEGATIVE: 1
GASTROINTESTINAL NEGATIVE: 1

## 2022-02-07 ASSESSMENT — PATIENT HEALTH QUESTIONNAIRE - PHQ9
SUM OF ALL RESPONSES TO PHQ9 QUESTIONS 1 & 2: 0
1. LITTLE INTEREST OR PLEASURE IN DOING THINGS: 0
2. FEELING DOWN, DEPRESSED OR HOPELESS: 0
SUM OF ALL RESPONSES TO PHQ QUESTIONS 1-9: 0

## 2022-02-07 NOTE — ASSESSMENT & PLAN NOTE
NSTEMI 3/27/19. Cath : triple vessel disease : refused CABG or stent. On medical treatment. On Plavix and statins  Sees Cardiologist Dr Armando Mcmillan.

## 2022-02-07 NOTE — ASSESSMENT & PLAN NOTE
She is seeing Dr Keri Kang, but doctor is moving. She will see other neurologist   She is on Carbidopa-levadopa   Pt states she does not have any neurologist now  Will refer to Dr Sanjiv Marr.  Pt has not seen him

## 2022-02-07 NOTE — ASSESSMENT & PLAN NOTE
Left breast, Radiation therapy, lumpectomy 1985  Does not see any surgeon in f/u  Gets mammo every year  Last mammogram 2/2019 :   Does not want test now.  Refused again

## 2022-02-07 NOTE — PROGRESS NOTES
[place]  Patient's  is 1932  Seen in office on 2022      SUBJECTIVE:  Tyra badillo 80 y. o.year old female presents today   Chief Complaint   Patient presents with    3 Month Follow-Up    Other     sees Dr. Hi Galicia      Patient is here for follow-up of hyperlipidemia, Parkinson's disease, coronary artery disease and diabetes  Patient states she is feeling well. Denies any falls or injuries. Patient has DM. No hypoglycemia. No numbness or weakness. No dizziness. Blood sugars are good at home. Patient has hyperlipidemia. Taking medications. No abdominal pain, no nausea or vomiting. No myalgias. Patient has Parkinson's disease and is taking medication. He was planning to call herself Dr. Ashley Armas but has not made the appointment yet. Patient has coronary artery disease and has triple-vessel disease that is managed medically. She refused a stent and CABG  Taking medications regularly. No side effects noted. Review of Systems   Constitutional: Negative. HENT: Negative. Eyes: Negative. Respiratory: Negative. Negative for cough, shortness of breath and wheezing. Cardiovascular: Negative. Gastrointestinal: Negative. Endocrine: Negative. Genitourinary: Negative. Musculoskeletal: Negative. Skin: Negative. Allergic/Immunologic: Negative. Neurological: Negative. Hematological: Negative. Psychiatric/Behavioral: Negative. OBJECTIVE: /64   Pulse 72   Temp 98.1 °F (36.7 °C) (Temporal)   Resp 16   Wt 118 lb (53.5 kg)   SpO2 96%   BMI 21.58 kg/m²     Wt Readings from Last 3 Encounters:   22 118 lb (53.5 kg)   21 117 lb 3.2 oz (53.2 kg)   21 118 lb 3.2 oz (53.6 kg)        Patient was seen taking COVID-19 precautions. Face mask, gloves were used. Patient also wore facemask. GENERAL: - Alert, oriented, pleasant, in no apparent distress. HEENT: - Conjunctiva pink, no scleral icterus. ENT clear. NECK: -Supple.   No jugular venous distention noted. No masses felt,  CARDIOVASCULAR: - Normal S1 and S2    PULMONARY: - No respiratory distress. No wheezes or rales. ABDOMEN: - Soft and non-tender,no masses  ororganomegaly. EXTREMITIES: - No cyanosis, clubbing, or significant edema. SKIN: Skin is warm and dry. NEUROLOGICAL: - Cranial nerves II through XII are grossly intact. IMPRESSION:    Encounter Diagnoses   Name Primary?  Mixed hyperlipidemia Yes    Malignant neoplasm of left female breast, unspecified estrogen receptor status, unspecified site of breast ([facility]     Parkinson disease ([facility]     Stenosis of left subclavian artery ([facility]     Coronary artery disease involving native coronary artery of native heart with angina pectoris ([facility]     Diabetes mellitus type 2 in nonobese ([facility]     History of malignant neoplasm of left breast     Glaucoma of both eyes, unspecified glaucoma type     Allergic rhinitis, unspecified seasonality, unspecified trigger        ASSESSMENT/PLAN:    Mixed hyperlipidemia    -pt is on atorvastatin  40 mg daily. Follow diet and exercise    Parkinson disease [facility])    She is seeing Dr Yunier Corral, but doctor is moving. She will see other neurologist   She is on Carbidopa-levadopa   Pt states she does not have any neurologist now  Will refer to Dr Justin Cesar. Diabetes mellitus type 2 in nonobese [facility])    Since 2005  [employer] Dr. Shelby Lewis also  Continue metformin  mg bid  Last hga1c was 6.1    History of malignant neoplasm of left breast    Left breast, Radiation therapy, lumpectomy 1985  Does not see any surgeon in f/u  Gets mammo every year  Last mammogram 2/2019 :   Does not want test now. Refused again    Glaucoma of both eyes    Sees Dr Machelle Leo  Uses eye drops. Coronary artery disease involving native coronary artery of native heart with angina pectoris ([address]    NSTEMI 3/27/19. Cath : triple vessel disease : refused CABG or stent. On medical treatment.   On Plavix and statins  Sees Cardiologist Dr Kavon Pineda. Allergic rhinitis    Patient has allergic rhinitis and is taking Flonase. Malignant neoplasm of left female breast, unspecified estrogen receptor status, unspecified site of breast ([address]     Orders Placed This Encounter   Procedures    Comprehensive Metabolic Panel    Lipid, Fasting    Hemoglobin A1C    Darline Luz MD, Neurology, Michaela Williamson     Return to office in 3 months. Mediations reviewed with the patient. Continue current medications. Appropriate prescriptions are addressed. After visit summeryprovided. Follow up as directed sooner if needed. Questions answered and patient verbalizes understanding. Call for any problems, questions, or concerns. Allergies   Allergen Reactions    Eggs Or Egg-Derived Products [Eggs Or Egg-Derived Products] Hives    Iodine      Iodine contrast     Current Outpatient Medications   Medication Sig Dispense Refill    fluticasone (FLONASE) 50 MCG/ACT nasal spray 1 spray by Nasal route daily 1 each 3    lisinopril (PRINIVIL;ZESTRIL) 5 MG tablet Take 1 tablet by mouth daily 30 tablet 5    carvedilol (COREG) 6.25 MG tablet Take 1 tablet by mouth 2 times daily (with meals) 60 tablet 5    metFORMIN (GLUCOPHAGE-XR) 500 MG extended release tablet TAKE 1 TABLET BY MOUTH TWICE A DAY WITH MEALS 180 tablet 1    nitroGLYCERIN (NITROSTAT) 0.4 MG SL tablet up to max of 3 total doses. If no relief after 1 dose, call 911. 25 tablet 0    clopidogrel (PLAVIX) 75 MG tablet Take 1 tablet by mouth daily 30 tablet 5    atorvastatin (LIPITOR) 40 MG tablet Take 1 tablet by mouth nightly 30 tablet 5    dorzolamide-timolol (COSOPT) 22.3-6.8 MG/ML ophthalmic solution Place 1 drop into both eyes 2 times daily  3    carbidopa-levodopa (SINEMET)  MG per tablet Take 1 tablet by mouth 3 times daily  90 tablet     aspirin 81 MG tablet Take 81 mg by mouth daily.       blood glucose test strips (FREESTYLE LITE) strip PATIENT TESTING ONCE DAILY AND AS NEEDED, DX=E11.9 50 strip 3     No current facility-administered medications for this visit. Past Medical History:   Diagnosis Date    CAD (coronary artery disease)     suspected, refused w/u    Coronary artery disease suspected. refused cardio     Suspected. Refused to see cardio.  Diabetes mellitus ([facility] since 4/08    [facility] ulcer ([facility] left     resolved    Glaucoma     Dr. Glen Lozada History of left heart catheterization 03/29/2019    EF > 55% 3 vessel CAD as described. In particular heavily calcified critical bifurcation of LAD/diagonal in addition to being small in size.  Hx of Doppler echocardiogram 03/28/2019    EF 50% Hypokinetic lateral wall. Grade 3 diastolic dysfucunction. Mild concentric LV hypertrophy. Mild pulmonary htn,mild mr , mod tr.    Hyperlipidemia     Left breast mass 03/22/2017    Pt seen Dr Dunlap Comment for that. Mammo. Neg. She thinks it scar tissue.      Malignant neoplasm of left breast ([facility] 08/31/2015    Left breast, Radiation therapy, lumpectomy 1985    Parkinson disease ([facility] 2008    dr. Mainor Green following    Stenosis of left subclavian artery [facility])     refused w/u     Past Surgical History:   Procedure Laterality Date    APPENDECTOMY      BREAST LUMPECTOMY Left 1995    MARY AND BSO Right     TONSILLECTOMY       Social History     Tobacco Use    Smoking status: Never Smoker    Smokeless tobacco: Never Used   Substance Use Topics    Alcohol use: Never     Alcohol/week: 0.0 standard drinks       LAB REVIEW:  CBC:   Lab Results   Component Value Date    WBC 6.4 10/09/2020    HGB 11.5 10/09/2020    HCT 34.8 10/09/2020     10/09/2020     Lipids:   Lab Results   Component Value Date    HDL 54 11/02/2021    LDLCALC 73 11/02/2021    LDLDIRECT 117 (H) 10/12/2018    TRIGLYCFAST 88 11/02/2021    CHOLFAST 145 11/02/2021     Renal:   Lab Results   Component Value Date    BUN 19 11/02/2021    CREATININE 0.9 11/02/2021     11/02/2021    K 4.7 11/02/2021    ALT 10 11/02/2021    AST 15 11/02/2021    GLUCOSE 99 11/02/2021    GLUF 169 03/12/2018     PT/INR: No results found for: INR  A1C:   Lab Results   Component Value Date    LABA1C 6.1 11/02/2021           Micki Thibodeaux MD, 2/7/2022 , 10:16 AM

## 2022-03-04 NOTE — TELEPHONE ENCOUNTER
Medication:   Requested Prescriptions     Pending Prescriptions Disp Refills    fluticasone (FLONASE) 50 MCG/ACT nasal spray [Pharmacy Med Name: FLUTICASONE PROP 50 MCG SPRAY]  3     Sig: SPRAY 1 SPRAY BY NASAL ROUTE EVERY DAY        Last Filled:      Patient Phone Number: [phone] (home)     Last appt: 2/7/2022   Next appt: 5/6/2022    Last OARRS: No flowsheet data found.

## 2022-03-07 RX ORDER — FLUTICASONE PROPIONATE 50 MCG
SPRAY, SUSPENSION (ML) NASAL
Qty: 1 EACH | Refills: 3 | Status: SHIPPED | OUTPATIENT
Start: 2022-03-07

## 2022-03-18 ENCOUNTER — TELEMEDICINE (OUTPATIENT)
Dept: INTERNAL MEDICINE CLINIC | Age: 87
End: 2022-03-18
Payer: MEDICARE

## 2022-03-18 DIAGNOSIS — Z00.00 MEDICARE ANNUAL WELLNESS VISIT, SUBSEQUENT: Primary | ICD-10-CM

## 2022-03-18 PROCEDURE — 4040F PNEUMOC VAC/ADMIN/RCVD: CPT | Performed by: INTERNAL MEDICINE

## 2022-03-18 PROCEDURE — G0439 PPPS, SUBSEQ VISIT: HCPCS | Performed by: INTERNAL MEDICINE

## 2022-03-18 PROCEDURE — G8484 FLU IMMUNIZE NO ADMIN: HCPCS | Performed by: INTERNAL MEDICINE

## 2022-03-18 PROCEDURE — 1123F ACP DISCUSS/DSCN MKR DOCD: CPT | Performed by: INTERNAL MEDICINE

## 2022-03-18 RX ORDER — CARVEDILOL 6.25 MG/1
6.25 TABLET ORAL 2 TIMES DAILY
Qty: 180 TABLET | Refills: 3 | Status: SHIPPED | OUTPATIENT
Start: 2022-03-18 | End: 2022-08-08 | Stop reason: SDUPTHER

## 2022-03-18 SDOH — ECONOMIC STABILITY: FOOD INSECURITY: WITHIN THE PAST 12 MONTHS, THE FOOD YOU BOUGHT JUST DIDN'T LAST AND YOU DIDN'T HAVE MONEY TO GET MORE.: NEVER TRUE

## 2022-03-18 SDOH — ECONOMIC STABILITY: FOOD INSECURITY: WITHIN THE PAST 12 MONTHS, YOU WORRIED THAT YOUR FOOD WOULD RUN OUT BEFORE YOU GOT MONEY TO BUY MORE.: NEVER TRUE

## 2022-03-18 ASSESSMENT — PATIENT HEALTH QUESTIONNAIRE - PHQ9
2. FEELING DOWN, DEPRESSED OR HOPELESS: 0
SUM OF ALL RESPONSES TO PHQ QUESTIONS 1-9: 0
SUM OF ALL RESPONSES TO PHQ QUESTIONS 1-9: 0
1. LITTLE INTEREST OR PLEASURE IN DOING THINGS: 0
SUM OF ALL RESPONSES TO PHQ9 QUESTIONS 1 & 2: 0
SUM OF ALL RESPONSES TO PHQ QUESTIONS 1-9: 0
SUM OF ALL RESPONSES TO PHQ QUESTIONS 1-9: 0

## 2022-03-18 ASSESSMENT — LIFESTYLE VARIABLES: HOW OFTEN DO YOU HAVE A DRINK CONTAINING ALCOHOL: NEVER

## 2022-03-18 ASSESSMENT — SOCIAL DETERMINANTS OF HEALTH (SDOH): HOW HARD IS IT FOR YOU TO PAY FOR THE VERY BASICS LIKE FOOD, HOUSING, MEDICAL CARE, AND HEATING?: NOT HARD AT ALL

## 2022-03-18 NOTE — PROGRESS NOTES
Medicare Annual Wellness Visit    Yoav Randolph is here for Medicare AWV    Assessment & Plan   Medicare annual wellness visit, subsequent      Recommendations for Preventive Services Due: see orders and patient instructions/AVS.  Recommended screening schedule for the next 5-10 years is provided to the patient in written form: see Patient Instructions/AVS.     No follow-ups on file. Subjective       Patient's complete Health Risk Assessment and screening values have been reviewed and are found in Flowsheets. The following problems were reviewed today and where indicated follow up appointments were made and/or referrals ordered. Positive Risk Factor Screenings with Interventions:     Cognitive: Words recalled: 2 Words Recalled  Total Score Interpretation: Abnormal Mini-Cog  Did the patient refuse to take the cognition test?: No    Cognitive Impairment Interventions:  · Patient declines any further evaluation/treatment for cognitive impairment   · Patient has upcoming appointments with cardiology next week, and her new neurologist 4/26. She is scheduled to see PCP for follow up 5/6/22. General Health and ACP:  General  In general, how would you say your health is?: Good  In the past 7 days, have you experienced any of the following: New or Increased Pain, New or Increased Fatigue, Loneliness, Social Isolation, Stress or Anger?: No  Do you get the social and emotional support that you need?: Yes  Do you have a Living Will?: (!) No    Advance Directives     Power of  Living Will ACP-Advance Directive ACP-Power of     Not on File Not on File Not on File Not on File      General Health Risk Interventions:  · No Living Will: Advance Care Planning addressed with patient today              Objective      Patient-Reported Vitals  No data recorded     Unable to obtain 3 vital signs due to patient not having equipment to take blood pressure/temperature.            Allergies   Allergen Reactions    Eggs Or Egg-Derived Products [Eggs Or Egg-Derived Products] Hives    Iodine      Iodine contrast     Prior to Visit Medications    Medication Sig Taking? Authorizing Provider   carvedilol (COREG) 6.25 MG tablet Take 1 tablet by mouth 2 times daily Yes Clifton Alvarez MD   fluticasone (FLONASE) 50 MCG/ACT nasal spray SPRAY 1 SPRAY BY NASAL ROUTE EVERY DAY Yes Lewis Courtney MD   lisinopril (PRINIVIL;ZESTRIL) 5 MG tablet Take 1 tablet by mouth daily Yes Clifton Alvarez MD   metFORMIN (GLUCOPHAGE-XR) 500 MG extended release tablet TAKE 1 TABLET BY MOUTH TWICE A DAY WITH MEALS Yes Lewis Courtney MD   clopidogrel (PLAVIX) 75 MG tablet Take 1 tablet by mouth daily Yes Clifton Alvarez MD   atorvastatin (LIPITOR) 40 MG tablet Take 1 tablet by mouth nightly Yes Clifton Alvarez MD   blood glucose test strips (FREESTYLE LITE) strip PATIENT TESTING ONCE DAILY AND AS NEEDED, DX=E11.9 Yes Lewis Courtney MD   dorzolamide-timolol (COSOPT) 22.3-6.8 MG/ML ophthalmic solution Place 1 drop into both eyes 2 times daily Yes Historical Provider, MD   carbidopa-levodopa (SINEMET)  MG per tablet Take 1 tablet by mouth 3 times daily  Yes Lewis Courtney MD   aspirin 81 MG tablet Take 81 mg by mouth daily. Yes Historical Provider, MD   nitroGLYCERIN (NITROSTAT) 0.4 MG SL tablet up to max of 3 total doses. If no relief after 1 dose, call 911. Patient not taking: Reported on 3/18/2022  Clifton Alvarez MD       [facility] (Including outside providers/suppliers regularly involved in providing care):   Patient Care Team:  Lweis Courtney MD as PCP - General  Lewis Courtney MD [facility] - [facility] Provider  Clifton Alvarez MD as Consulting Physician (Cardiology)  Minh Acosta (Neurology)    Reviewed and updated this visit:  Tobacco  Allergies  Meds  Med Hx  Surg Hx  Soc Hx  Fam Hx           Mercedes Simpler, was evaluated through a synchronous (real-time) audio-video encounter.  The patient (or guardian if applicable) is aware that this is a billable service, which includes applicable co-pays. This Virtual Visit was conducted with patient's (and/or legal guardian's) consent. The visit was conducted pursuant to the emergency declaration under the [address] [place] [address] and the [employer] and [employer] General Act. Patient identification was verified, and a caregiver was present when appropriate. The patient was located at home in a state where the provider was licensed to provide care. Perla Orozco LPN, 9/07/[birth year], performed the documented evaluation under the direct supervision of the attending physician. Dorsie Meigs, was evaluated through a synchronous (real-time) audio encounter. The patient (or guardian if applicable) is aware that this is a billable service, which includes applicable co-pays. This Virtual Visit was conducted with patient's (and/or legal guardian's) consent. The visit was conducted pursuant to the emergency declaration under the [address] [place] [address] and the [employer] and [employer] General Act. Patient identification was verified, and a caregiver was present when appropriate. The patient was located at home in a state where the provider was licensed to provide care. Total time spent for this encounter: Not billed by time    --Zaheer Souza LPN on 5/92/4468 at 6:89 PM    An electronic signature was used to authenticate this note.

## 2022-03-18 NOTE — PATIENT INSTRUCTIONS
Personalized Preventive Plan for Annabel Hsu - 3/18/2022  Medicare offers a range of preventive health benefits. Some of the tests and screenings are paid in full while other may be subject to a deductible, co-insurance, and/or copay. Some of these benefits include a comprehensive review of your medical history including lifestyle, illnesses that may run in your family, and various assessments and screenings as appropriate. After reviewing your medical record and screening and assessments performed today your provider may have ordered immunizations, labs, imaging, and/or referrals for you. A list of these orders (if applicable) as well as your Preventive Care list are included within your After Visit Summary for your review. Other Preventive Recommendations:    · A preventive eye exam performed by an eye specialist is recommended every 1-2 years to screen for glaucoma; cataracts, macular degeneration, and other eye disorders. · A preventive dental visit is recommended every 6 months. · Try to get at least 150 minutes of exercise per week or 10,000 steps per day on a pedometer . · Order or download the FREE \"Exercise & Physical Activity: Your Everyday Guide\" from [employer]. Call 0-400.455.2390 or search [employer] online. · You need 8674-3071 mg of calcium and 4372-0852 IU of vitamin D per day. It is possible to meet your calcium requirement with diet alone, but a vitamin D supplement is usually necessary to meet this goal.  · When exposed to the sun, use a sunscreen that protects against both UVA and UVB radiation with an SPF of 30 or greater. Reapply every 2 to 3 hours or after sweating, drying off with a towel, or swimming. · Always wear a seat belt when traveling in a car. Always wear a helmet when riding a bicycle or motorcycle.

## 2022-03-22 ENCOUNTER — OFFICE VISIT (OUTPATIENT)
Dept: CARDIOLOGY CLINIC | Age: 87
End: 2022-03-22
Payer: MEDICARE

## 2022-03-22 VITALS
DIASTOLIC BLOOD PRESSURE: 80 MMHG | BODY MASS INDEX: 21.35 KG/M2 | SYSTOLIC BLOOD PRESSURE: 118 MMHG | WEIGHT: 116 LBS | HEIGHT: 62 IN | HEART RATE: 68 BPM

## 2022-03-22 DIAGNOSIS — I77.1 STENOSIS OF LEFT SUBCLAVIAN ARTERY (HCC): ICD-10-CM

## 2022-03-22 DIAGNOSIS — E78.2 MIXED HYPERLIPIDEMIA: ICD-10-CM

## 2022-03-22 DIAGNOSIS — I25.119 CORONARY ARTERY DISEASE INVOLVING NATIVE CORONARY ARTERY OF NATIVE HEART WITH ANGINA PECTORIS (HCC): Primary | ICD-10-CM

## 2022-03-22 PROCEDURE — G8484 FLU IMMUNIZE NO ADMIN: HCPCS | Performed by: INTERNAL MEDICINE

## 2022-03-22 PROCEDURE — 1036F TOBACCO NON-USER: CPT | Performed by: INTERNAL MEDICINE

## 2022-03-22 PROCEDURE — G8420 CALC BMI NORM PARAMETERS: HCPCS | Performed by: INTERNAL MEDICINE

## 2022-03-22 PROCEDURE — 1090F PRES/ABSN URINE INCON ASSESS: CPT | Performed by: INTERNAL MEDICINE

## 2022-03-22 PROCEDURE — 99213 OFFICE O/P EST LOW 20 MIN: CPT | Performed by: INTERNAL MEDICINE

## 2022-03-22 PROCEDURE — 4040F PNEUMOC VAC/ADMIN/RCVD: CPT | Performed by: INTERNAL MEDICINE

## 2022-03-22 PROCEDURE — 1123F ACP DISCUSS/DSCN MKR DOCD: CPT | Performed by: INTERNAL MEDICINE

## 2022-03-22 PROCEDURE — G8427 DOCREV CUR MEDS BY ELIG CLIN: HCPCS | Performed by: INTERNAL MEDICINE

## 2022-03-22 RX ORDER — NITROGLYCERIN 0.4 MG/1
TABLET SUBLINGUAL
Qty: 25 TABLET | Refills: 3 | Status: SHIPPED | OUTPATIENT
Start: 2022-03-22

## 2022-03-22 NOTE — PATIENT INSTRUCTIONS
CORONARY ARTERY DISEASE:Yes. Patient had declined CABG.   clinically stable. Patient is on optimal medical regimen ( see medication list above )  -  Patient is currently  asymptomatic from CAD.          - changes in  treatment: no, on ASA, Plavix & Coreg           - Testing ordered:  no  [place] classification: 1  CATH 3/2019:   1. 3 vessel CAD as described. In particular Heavily Calcified,   critical bifurcation disease of LAD/ Diagonal in addition to   being small in size.   2. Normal LV systolic function. LVEF is > 55 %.     Patient tolerated the procedure well.   No immediate complications.    Recommendations   Urgent surgical CABG (required before discharge, patient stable   with medical management). HTN:well controlled on current medical regimen, see list above.             - changes in  treatment:   no, on Coreg & Lipitor   CARDIOMYOPATHY: None known   CONGESTIVE HEART FAILURE: NO KNOWN HISTORY.     VHD: No significant VHD noted  ECHO 3/2019   Left ventricular function is normal, EF is estimated at 50%.  Hypokinetic lateral wall.   Grade III diastolic dysfunction.   Mild concentric left ventricular hypertrophy.   Right ventricular systolic pressure of 39 mm Hg suggestive of mild pulmonary   hypertension.   Mild mitral regurgitation is present.   Moderate tricuspid regurgitation is present.   No evidence of any pericardial effusion. DYSLIPIDEMIA: Patient's profile is at / near [facility] current medical regimen wellyes, on Lipitor                                                              See most recent Lab values in Labs section above. PAD:  Known Left Subclavian stenosis. Probable Left Carotid stenosis. Carotid Bruit: Patient still does not want any testing or surgery. Wants medical management as for her Carotid AD problem as well. Left subclavian stenosis: continue medical management.     TESTS ORDERED:None this visit PREVIOUSLY ORDERED TESTS REVIEWED & DISCUSSED WITH THE PATIENT:     I personally reviewed & interpreted, all previously ordered tests as copied above. Latest Labs are pulled in to the note with dates. Labs, specially in Reference to Lipid profile, Cardiac testing in the form of Echo ( dated: ), stress tests ( dated: ) & other relevant cardiac testing reviewed with patient & recommendations made based on assessment of the results. Discussed role of Cardiac risk factors & effects + treatment of co morbidities with patient & advised accordingly. MEDICATIONS: List of medications patient is currently taking is reviewed in detail with the patient. Discussed any side effects or problems taking the medication. Recommend Continue present management & medications as listed. AFFIRMATION: I spent at least 20 minutes of time reviewing patient's history, previous & current medical problems & all Labs + testing. This includes chart prep even prior to the vosit. Various goals are discussed and multiple questions answered. Relevant concelling performed. Office follow up in six months.

## 2022-03-22 NOTE — LETTER
[address]  [address] [address]  Phone: [phone]  Fax: [phone]    Aliyah Cordoba MD    March 22, 2022     Dana Ferraro MD  [address] [employer] [facility] [address]    Patient: Jose Orozco   MR Number: [id number]   YOB: 1932   Date of Visit: 3/22/2022       Dear Dana Ferraro: Thank you for referring Mumtaz Alvarez to me for evaluation/treatment. Below are the relevant portions of my assessment and plan of care. If you have questions, please do not hesitate to call me. I look forward to following Valarie Cee along with you.     Sincerely,      Aliyah Cordoba MD

## 2022-03-22 NOTE — PROGRESS NOTES
OFFICE PROGRESS NOTES      Omid Coker is a 80 y.o. female who has    CHIEF COMPLAINT AS FOLLOWS:  CHEST PAIN: Patient denies any C/O chest pains at this time.      SOB: No C/O SOB at this time.               LEG EDEMA: No leg edema   PALPITATIONS: Denies any C/O Palpitations   DIZZINESS: No C/O Dizziness   SYNCOPE: None   OTHER/ ADDITIONAL COMPLAINTS:                                     HPI: Patient is here for F/U on his CAD,  HTN & Dyslipidemia problems. CAD: Patient has known CAD. Patient had Refused W/U. 3/2019-Pt had refused CABG and any intervention, opted for med mx. HTN: Patient has known essential HTN. Has been treated with guideline recommended medical / physical/ diet therapy as stated below. Dyslipidemia: Patient has known mixed dyslipidemia. Has been treated with guideline recommended medical / physical/ diet therapy as stated below. Current Outpatient Medications   Medication Sig Dispense Refill    nitroGLYCERIN (NITROSTAT) 0.4 MG SL tablet up to max of 3 total doses.  If no relief after 1 dose, call 911. 25 tablet 3    carvedilol (COREG) 6.25 MG tablet Take 1 tablet by mouth 2 times daily 180 tablet 3    fluticasone (FLONASE) 50 MCG/ACT nasal spray SPRAY 1 SPRAY BY NASAL ROUTE EVERY DAY 1 each 3    lisinopril (PRINIVIL;ZESTRIL) 5 MG tablet Take 1 tablet by mouth daily 30 tablet 5    metFORMIN (GLUCOPHAGE-XR) 500 MG extended release tablet TAKE 1 TABLET BY MOUTH TWICE A DAY WITH MEALS 180 tablet 1    clopidogrel (PLAVIX) 75 MG tablet Take 1 tablet by mouth daily 30 tablet 5    atorvastatin (LIPITOR) 40 MG tablet Take 1 tablet by mouth nightly 30 tablet 5    blood glucose test strips (FREESTYLE LITE) strip PATIENT TESTING ONCE DAILY AND AS NEEDED, DX=E11.9 50 strip 3    dorzolamide-timolol (COSOPT) 22.3-6.8 MG/ML ophthalmic solution Place 1 drop into both eyes 2 times daily  3    carbidopa-levodopa (SINEMET)  MG per tablet Take 1 tablet by mouth 3 times daily  90 tablet  aspirin 81 MG tablet Take 81 mg by mouth daily. No current facility-administered medications for this visit. Allergies: Eggs or egg-derived products [eggs or egg-derived products] and Iodine  Review of Systems:    Constitutional: Negative for diaphoresis and fatigue  Respiratory: Negative for shortness of breath  Cardiovascular: Negative for chest pain, dyspnea on exertion, claudication, edema, irregular heartbeat, murmur, palpitations or shortness of breath  Musculoskeletal: Negative for muscle pain, muscular weakness, negative for pain in arm and leg or swelling in foot and leg    Objective:  /80   Pulse 68   Ht 5' 2\" (1.575 m)   Wt 116 lb (52.6 kg)   BMI 21.22 kg/m²   Wt Readings from Last 3 Encounters:   03/22/22 116 lb (52.6 kg)   02/07/22 118 lb (53.5 kg)   11/08/21 117 lb 3.2 oz (53.2 kg)     Body mass index is 21.22 kg/m². GENERAL - Alert, oriented, pleasant, in no apparent distress. EYES: No jaundice, no conjunctival pallor. Neck - Supple. No jugular venous distention noted. Left carotid bruit +. Left Sub-Clavian Bruit +  Cardiovascular - Normal S1 and S2 without obvious murmur or gallop. Extremities - No cyanosis, clubbing, or significant edema. Pulmonary - No respiratory distress. No wheezes or rales.       MEDICAL DECISION MAKING & DATA REVIEW:    Lab Review   Lab Results   Component Value Date    TROPONINT 0.314 03/28/2019    TROPONINT 0.278 03/28/2019     No results found for: BNP, PROBNP  No results found for: INR  Lab Results   Component Value Date    LABA1C 6.1 11/02/2021    LABA1C 6.3 04/01/2021     Lab Results   Component Value Date    WBC 6.4 10/09/2020    WBC 6.5 03/31/2019    HCT 34.8 (L) 10/09/2020    HCT 36.6 (L) 03/31/2019    MCV 83.7 10/09/2020    MCV 90.1 03/31/2019     10/09/2020     03/31/2019     Lab Results   Component Value Date    CHOL 161 03/17/2017    CHOL 154 09/12/2016    TRIG 71 03/17/2017    TRIG 79 09/12/2016    HDL 54 11/02/2021 HDL 62 (H) 04/01/2021    LDLCALC 73 11/02/2021    LDLCALC 124 (H) 04/01/2021    LDLDIRECT 117 (H) 10/12/2018    LDLDIRECT 105 (H) 03/12/2018     Lab Results   Component Value Date    ALT 10 11/02/2021    ALT 8 (L) 04/01/2021    AST 15 11/02/2021    AST 13 (L) 04/01/2021     BMP:    Lab Results   Component Value Date     11/02/2021     04/01/2021    K 4.7 11/02/2021    K 4.5 04/01/2021     11/02/2021     04/01/2021    CO2 25 11/02/2021    CO2 29 04/01/2021    BUN 19 11/02/2021    BUN 18 04/01/2021    CREATININE 0.9 11/02/2021    CREATININE 0.8 04/01/2021     CMP:   Lab Results   Component Value Date     11/02/2021     04/01/2021    K 4.7 11/02/2021    K 4.5 04/01/2021     11/02/2021     04/01/2021    CO2 25 11/02/2021    CO2 29 04/01/2021    BUN 19 11/02/2021    BUN 18 04/01/2021    CREATININE 0.9 11/02/2021    CREATININE 0.8 04/01/2021    PROT 7.1 11/02/2021    PROT 7.5 04/01/2021    PROT 7.3 01/15/2013    PROT 7.2 10/06/2012     No results found for: TSH, TSHHS    QUALITY MEASURES REVIEWED:  1.CAD:Patient is taking anti platelet agent:Yes  2. DYSLIPIDEMIA: Patient is on cholesterol lowering medication:Yes   3. Beta-Blocker therapy for CAD, if prior Myocardial Infarction:Yes   4. Counselled regarding smoking cessation. No   Patient does not Smoke. 5.Anticoagulation therapy (for A.Fib) No   Does Not have A.Fib.  6.Discussed weight management strategies. Assessment & Plan:  Primary / Secondary prevention is the goal by aggressive risk modification, healthy and therapeutic life style changes for cardiovascular risk reduction. CORONARY ARTERY DISEASE:Yes. Patient had declined CABG.   clinically stable. Patient is on optimal medical regimen ( see medication list above )  -  Patient is currently  asymptomatic from CAD.             - changes in  treatment: no, on ASA, Plavix & Coreg           - Testing ordered:  no  [place] classification: 1  CATH 3/2019:   1. 3 vessel CAD as described. In particular Heavily Calcified,   critical bifurcation disease of LAD/ Diagonal in addition to   being small in size.   2. Normal LV systolic function. LVEF is > 55 %.     Patient tolerated the procedure well.   No immediate complications.    Recommendations   Urgent surgical CABG (required before discharge, patient stable   with medical management). HTN:well controlled on current medical regimen, see list above.             - changes in  treatment:   no, on Coreg & Lipitor   CARDIOMYOPATHY: None known   CONGESTIVE HEART FAILURE: NO KNOWN HISTORY.     VHD: No significant VHD noted  ECHO 3/2019   Left ventricular function is normal, EF is estimated at 50%.  Hypokinetic lateral wall.   Grade III diastolic dysfunction.   Mild concentric left ventricular hypertrophy.   Right ventricular systolic pressure of 39 mm Hg suggestive of mild pulmonary   hypertension.   Mild mitral regurgitation is present.   Moderate tricuspid regurgitation is present.   No evidence of any pericardial effusion. DYSLIPIDEMIA: Patient's profile is at / near [facility] current medical regimen wellyes, on Lipitor                                                              See most recent Lab values in Labs section above. PAD:  Known Left Subclavian stenosis. Probable Left Carotid stenosis. Carotid Bruit: Patient still does not want any testing or surgery. Wants medical management as for her Carotid AD problem as well. Left subclavian stenosis: continue medical management. TESTS ORDERED:None this visit     PREVIOUSLY ORDERED TESTS REVIEWED & DISCUSSED WITH THE PATIENT:     I personally reviewed & interpreted, all previously ordered tests as copied above. Latest Labs are pulled in to the note with dates.    Labs, specially in Reference to Lipid profile, Cardiac testing in the form of Echo ( dated: ), stress tests ( dated: ) & other relevant cardiac testing reviewed with patient & recommendations made based on assessment of the results. Discussed role of Cardiac risk factors & effects + treatment of co morbidities with patient & advised accordingly. MEDICATIONS: List of medications patient is currently taking is reviewed in detail with the patient. Discussed any side effects or problems taking the medication. Recommend Continue present management & medications as listed. AFFIRMATION: I spent at least 20 minutes of time reviewing patient's history, previous & current medical problems & all Labs + testing. This includes chart prep even prior to the vosit. Various goals are discussed and multiple questions answered. Relevant concelling performed. Office follow up in six months.

## 2022-04-26 ENCOUNTER — OFFICE VISIT (OUTPATIENT)
Dept: NEUROLOGY | Age: 87
End: 2022-04-26
Payer: MEDICARE

## 2022-04-26 VITALS
DIASTOLIC BLOOD PRESSURE: 72 MMHG | BODY MASS INDEX: 19.46 KG/M2 | SYSTOLIC BLOOD PRESSURE: 130 MMHG | HEART RATE: 85 BPM | HEIGHT: 64 IN | WEIGHT: 114 LBS | OXYGEN SATURATION: 99 %

## 2022-04-26 DIAGNOSIS — G20 PARKINSON DISEASE (HCC): Primary | ICD-10-CM

## 2022-04-26 PROCEDURE — 4040F PNEUMOC VAC/ADMIN/RCVD: CPT | Performed by: PSYCHIATRY & NEUROLOGY

## 2022-04-26 PROCEDURE — 1036F TOBACCO NON-USER: CPT | Performed by: PSYCHIATRY & NEUROLOGY

## 2022-04-26 PROCEDURE — 1123F ACP DISCUSS/DSCN MKR DOCD: CPT | Performed by: PSYCHIATRY & NEUROLOGY

## 2022-04-26 PROCEDURE — 1090F PRES/ABSN URINE INCON ASSESS: CPT | Performed by: PSYCHIATRY & NEUROLOGY

## 2022-04-26 PROCEDURE — 99204 OFFICE O/P NEW MOD 45 MIN: CPT | Performed by: PSYCHIATRY & NEUROLOGY

## 2022-04-26 PROCEDURE — G8420 CALC BMI NORM PARAMETERS: HCPCS | Performed by: PSYCHIATRY & NEUROLOGY

## 2022-04-26 PROCEDURE — G8427 DOCREV CUR MEDS BY ELIG CLIN: HCPCS | Performed by: PSYCHIATRY & NEUROLOGY

## 2022-05-02 ENCOUNTER — NURSE ONLY (OUTPATIENT)
Dept: INTERNAL MEDICINE CLINIC | Age: 87
End: 2022-05-02
Payer: MEDICARE

## 2022-05-02 DIAGNOSIS — E11.9 DIABETES MELLITUS TYPE 2 IN NONOBESE (HCC): ICD-10-CM

## 2022-05-02 DIAGNOSIS — E78.2 MIXED HYPERLIPIDEMIA: ICD-10-CM

## 2022-05-02 LAB
A/G RATIO: 1.6 (ref 1.1–2.2)
ALBUMIN SERPL-MCNC: 4.4 G/DL (ref 3.4–5)
ALP BLD-CCNC: 85 U/L (ref 40–129)
ALT SERPL-CCNC: <5 U/L (ref 10–40)
ANION GAP SERPL CALCULATED.3IONS-SCNC: 13 MMOL/L (ref 3–16)
AST SERPL-CCNC: 10 U/L (ref 15–37)
BILIRUB SERPL-MCNC: 0.5 MG/DL (ref 0–1)
BUN BLDV-MCNC: 19 MG/DL (ref 7–20)
CALCIUM SERPL-MCNC: 9.3 MG/DL (ref 8.3–10.6)
CHLORIDE BLD-SCNC: 102 MMOL/L (ref 99–110)
CHOLESTEROL, FASTING: 133 MG/DL (ref 0–199)
CO2: 25 MMOL/L (ref 21–32)
CREAT SERPL-MCNC: 0.8 MG/DL (ref 0.6–1.2)
GFR AFRICAN AMERICAN: >60
GFR NON-AFRICAN AMERICAN: >60
GLUCOSE BLD-MCNC: 132 MG/DL (ref 70–99)
HDLC SERPL-MCNC: 49 MG/DL (ref 40–60)
LDL CHOLESTEROL CALCULATED: 60 MG/DL
POTASSIUM SERPL-SCNC: 4.6 MMOL/L (ref 3.5–5.1)
SODIUM BLD-SCNC: 140 MMOL/L (ref 136–145)
TOTAL PROTEIN: 7.1 G/DL (ref 6.4–8.2)
TRIGLYCERIDE, FASTING: 120 MG/DL (ref 0–150)
VLDLC SERPL CALC-MCNC: 24 MG/DL

## 2022-05-02 PROCEDURE — 36415 COLL VENOUS BLD VENIPUNCTURE: CPT | Performed by: INTERNAL MEDICINE

## 2022-05-03 LAB
ESTIMATED AVERAGE GLUCOSE: 134.1 MG/DL
HBA1C MFR BLD: 6.3 %

## 2022-05-06 ENCOUNTER — OFFICE VISIT (OUTPATIENT)
Dept: INTERNAL MEDICINE CLINIC | Age: 87
End: 2022-05-06
Payer: MEDICARE

## 2022-05-06 ENCOUNTER — TELEPHONE (OUTPATIENT)
Dept: INTERNAL MEDICINE CLINIC | Age: 87
End: 2022-05-06

## 2022-05-06 VITALS — BODY MASS INDEX: 20.08 KG/M2 | OXYGEN SATURATION: 98 % | TEMPERATURE: 97.1 F | WEIGHT: 117 LBS

## 2022-05-06 DIAGNOSIS — I77.1 STENOSIS OF LEFT SUBCLAVIAN ARTERY (HCC): ICD-10-CM

## 2022-05-06 DIAGNOSIS — E11.9 DIABETES MELLITUS TYPE 2 IN NONOBESE (HCC): ICD-10-CM

## 2022-05-06 DIAGNOSIS — J30.9 ALLERGIC RHINITIS, UNSPECIFIED SEASONALITY, UNSPECIFIED TRIGGER: ICD-10-CM

## 2022-05-06 DIAGNOSIS — G20 PARKINSON DISEASE (HCC): ICD-10-CM

## 2022-05-06 DIAGNOSIS — C50.912 MALIGNANT NEOPLASM OF LEFT FEMALE BREAST, UNSPECIFIED ESTROGEN RECEPTOR STATUS, UNSPECIFIED SITE OF BREAST (HCC): ICD-10-CM

## 2022-05-06 DIAGNOSIS — H40.9 GLAUCOMA OF BOTH EYES, UNSPECIFIED GLAUCOMA TYPE: ICD-10-CM

## 2022-05-06 DIAGNOSIS — I25.119 CORONARY ARTERY DISEASE INVOLVING NATIVE CORONARY ARTERY OF NATIVE HEART WITH ANGINA PECTORIS (HCC): ICD-10-CM

## 2022-05-06 DIAGNOSIS — Z85.3 HISTORY OF MALIGNANT NEOPLASM OF LEFT BREAST: ICD-10-CM

## 2022-05-06 DIAGNOSIS — E78.2 MIXED HYPERLIPIDEMIA: Primary | ICD-10-CM

## 2022-05-06 PROCEDURE — 1123F ACP DISCUSS/DSCN MKR DOCD: CPT | Performed by: INTERNAL MEDICINE

## 2022-05-06 PROCEDURE — G8427 DOCREV CUR MEDS BY ELIG CLIN: HCPCS | Performed by: INTERNAL MEDICINE

## 2022-05-06 PROCEDURE — 1036F TOBACCO NON-USER: CPT | Performed by: INTERNAL MEDICINE

## 2022-05-06 PROCEDURE — G8420 CALC BMI NORM PARAMETERS: HCPCS | Performed by: INTERNAL MEDICINE

## 2022-05-06 PROCEDURE — 1090F PRES/ABSN URINE INCON ASSESS: CPT | Performed by: INTERNAL MEDICINE

## 2022-05-06 PROCEDURE — 99214 OFFICE O/P EST MOD 30 MIN: CPT | Performed by: INTERNAL MEDICINE

## 2022-05-06 PROCEDURE — 3044F HG A1C LEVEL LT 7.0%: CPT | Performed by: INTERNAL MEDICINE

## 2022-05-06 RX ORDER — ACETAZOLAMIDE 500 MG/1
500 CAPSULE, EXTENDED RELEASE ORAL DAILY
COMMUNITY
Start: 2022-03-04 | End: 2022-10-27

## 2022-05-06 RX ORDER — LISINOPRIL 5 MG/1
5 TABLET ORAL DAILY
Qty: 30 TABLET | Refills: 5 | Status: SHIPPED | OUTPATIENT
Start: 2022-05-06 | End: 2022-08-08 | Stop reason: ALTCHOICE

## 2022-05-06 RX ORDER — METFORMIN HYDROCHLORIDE 500 MG/1
500 TABLET, EXTENDED RELEASE ORAL 2 TIMES DAILY WITH MEALS
Qty: 180 TABLET | Refills: 1 | Status: SHIPPED | OUTPATIENT
Start: 2022-05-06

## 2022-05-06 NOTE — PROGRESS NOTES
Jamia Mckeon  Patient's  is 1932  Seen in office on 2022      SUBJECTIVE:  Giorgio Miles aby 80 y. o.year old female presents today   Chief Complaint   Patient presents with    Follow-up     saw dr Bonnie Kc Medication Refill     Pt is here for f/u of HTN, DM and tremors  Pt states she saw neurologist for  parkinsonism  Pt states she is not taking medication Sinemet as it makes her tired  She does not want to take med  She does not know what neurologist told her. Patient has hypertension. Taking medications No headaches, no chest pain, no palpitations and no dizziness. Patient has DM. No hypoglycemia. No numbness or weakness. No dizziness. Blood sugars are good at home. Patient has tremors of the hands as before. Patient had blood test done the results were reviewed with the patient. Taking medications regularly. No side effects noted. Review of Systems   Constitutional: Negative. HENT: Negative. Eyes: Negative. Respiratory: Negative. Cardiovascular: Negative. Gastrointestinal: Negative. Endocrine: Negative. Genitourinary: Negative. Musculoskeletal: Negative. Skin: Negative. Neurological: Positive for tremors. Hematological: Negative. Psychiatric/Behavioral: Negative. OBJECTIVE: Temp 97.1 °F (36.2 °C)   Wt 117 lb (53.1 kg)   SpO2 98%   BMI 20.08 kg/m²     Wt Readings from Last 3 Encounters:   22 117 lb (53.1 kg)   22 114 lb (51.7 kg)   22 116 lb (52.6 kg)      GENERAL: - Alert, oriented, pleasant, in no apparent distress. HEENT: - Conjunctiva pink, no scleral icterus. ENT clear. NECK: -Supple. No jugular venous distention noted. No masses felt,  CARDIOVASCULAR: - Normal S1 and S2    PULMONARY: - No respiratory distress. No wheezes or rales. ABDOMEN: - Soft and non-tender,no masses  ororganomegaly. EXTREMITIES: - No cyanosis, clubbing, or significant edema. SKIN: Skin is warm and dry.    NEUROLOGICAL: - Cranial nerves II through XII are grossly intact. IMPRESSION:    Encounter Diagnoses   Name Primary?  Mixed hyperlipidemia Yes    Coronary artery disease involving native coronary artery of native heart with angina pectoris ([facility]     Stenosis of left subclavian artery     [facility]     Diabetes mellitus type 2 in nonobese ([facility]     History of malignant neoplasm of left breast     Glaucoma of both eyes, unspecified glaucoma type     Allergic rhinitis, unspecified seasonality, unspecified trigger     Malignant neoplasm of left female breast, unspecified estrogen receptor status, unspecified site of breast ([facility]        ASSESSMENT/PLAN:    Hyperlipidemia is stable. Follow low cholesterol diet. Continue current treatment. On atorvastatin. .  Lipid profile is very good. CAD : Patient is stable. Continue current treatment. Parkinson's disease not taking sinemet. Patient does not want to take it. Patient stated she already talked to neurologist  Allergic rhinitis stable continue current medication  History of breast cancer. Stable  Diabetes mellitus stable. Continue metformin. Hemoglobin A1c 6.3  Fall precautions discussed  Not on sinemet  Consult not avialable in Epic    Return to office in 3 months      Mediations reviewed with the patient. Continue current medications. Appropriate prescriptions are addressed. After visit summeryprovided. Follow up as directed sooner if needed. Questions answered and patient verbalizes understanding. Call for any problems, questions, or concerns. Allergies   Allergen Reactions    Eggs Or Egg-Derived Products [Eggs Or Egg-Derived Products] Hives    Iodine      Iodine contrast     Current Outpatient Medications   Medication Sig Dispense Refill    nitroGLYCERIN (NITROSTAT) 0.4 MG SL tablet up to max of 3 total doses.  If no relief after 1 dose, call 911. 25 tablet 3    carvedilol (COREG) 6.25 MG tablet Take 1 tablet by mouth 2 times daily 180 tablet 3    [facility])     refused w/u     Past Surgical History:   Procedure Laterality Date    APPENDECTOMY      BREAST LUMPECTOMY Left 1995    MARY AND BSO Right     TONSILLECTOMY       Social History     Tobacco Use    Smoking status: Never Smoker    Smokeless tobacco: Never Used   Substance Use Topics    Alcohol use: Never     Alcohol/week: 0.0 standard drinks       LAB REVIEW:  CBC:   Lab Results   Component Value Date    WBC 6.4 10/09/2020    HGB 11.5 10/09/2020    HCT 34.8 10/09/2020     10/09/2020     Lipids:   Lab Results   Component Value Date    HDL 49 05/02/2022    LDLCALC 60 05/02/2022    LDLDIRECT 117 (H) 10/12/2018    TRIGLYCFAST 120 05/02/2022    CHOLFAST 133 05/02/2022     Renal:   Lab Results   Component Value Date    BUN 19 05/02/2022    CREATININE 0.8 05/02/2022     05/02/2022    K 4.6 05/02/2022    ALT <5 05/02/2022    AST 10 05/02/2022    GLUCOSE 132 05/02/2022    GLUF 169 03/12/2018     PT/INR: No results found for: INR  A1C:   Lab Results   Component Value Date    LABA1C 6.3 05/02/2022           Héctor Ohara MD, 5/6/2022 , 10:27 AM

## 2022-05-18 NOTE — PROGRESS NOTES
5/18/22    Tracy Del Rio  11/26/1932    Chief Complaint   Patient presents with    New Patient     Parkinson's        History of Present Illness  Alessandra Schneider is a 80 y.o. female presenting today for evaluation of:  Parkinson's disease. She was previously seeing Dr. Magalis Villatoro from September 2016 to January 2017 and was seen [facility] neurology Dr. Alix Karimi from May 2017 to February 2021 when that physician left the practice. .  She states it was a few years ago that she saw a doctor in [place] when she was first starting to develop tremor of the left hand. It has started to progress to the right hand but still occurs more on the left. She notices it more when she is still than when she is moving. Eventually she saw Dr. Cherylene Salen and was started on carbidopa levodopa 25 mg / 100 mg 1 tablet 5 times a day, primidone 50 mg 3 times a day, and clonazepam 0.5 mg 1 tablet daily. When she established with Dr. Alix Karimi her medications were changed. The clonazepam was noted to be causing extreme fatigue and drowsiness and was stopped in February 2018. Primidone was also eventually discontinued as this was reported to be giving her nightmares. The carbidopa levodopa was also steadily decreased and currently she is on carbidopa levodopa 20 mg as 1 mg 3 times daily. It was decreased because it too was causing some fatigue. She seems to be doing well on her current dose. She denies any family history of Parkinson's disease. She has hyperlipidemia, coronary artery disease, and diabetes. She also endorses a history of breast cancer.       Subjective    Review of Symptoms:  Neurologic   Symptoms: no difficulty with gait or walking, no bowel symptoms, no vertigo, no confusion, no memory loss, no speech disorder, no visual loss, no double vision, no dizziness, no loss of hearing, no sensory disturbances, no weakness, no headaches, no bladder symptoms, no seizures, no excessive fatigue and no syncope    Current Outpatient Medications Medication Sig Dispense Refill    nitroGLYCERIN (NITROSTAT) 0.4 MG SL tablet up to max of 3 total doses. If no relief after 1 dose, call 911. 25 tablet 3    carvedilol (COREG) 6.25 MG tablet Take 1 tablet by mouth 2 times daily 180 tablet 3    fluticasone (FLONASE) 50 MCG/ACT nasal spray SPRAY 1 SPRAY BY NASAL ROUTE EVERY DAY 1 each 3    clopidogrel (PLAVIX) 75 MG tablet Take 1 tablet by mouth daily 30 tablet 5    atorvastatin (LIPITOR) 40 MG tablet Take 1 tablet by mouth nightly 30 tablet 5    blood glucose test strips (FREESTYLE LITE) strip PATIENT TESTING ONCE DAILY AND AS NEEDED, DX=E11.9 50 strip 3    dorzolamide-timolol (COSOPT) 22.3-6.8 MG/ML ophthalmic solution Place 1 drop into both eyes 2 times daily  3    aspirin 81 MG tablet Take 81 mg by mouth daily.  acetaZOLAMIDE (DIAMOX) 500 MG extended release capsule Take 500 mg by mouth daily  (Patient not taking: Reported on 5/6/2022)      lisinopril (PRINIVIL;ZESTRIL) 5 MG tablet Take 1 tablet by mouth daily 30 tablet 5    metFORMIN (GLUCOPHAGE-XR) 500 MG extended release tablet Take 1 tablet by mouth 2 times daily (with meals) 180 tablet 1     No current facility-administered medications for this visit. Past Medical History:   Diagnosis Date    CAD (coronary artery disease)     suspected, refused w/u    Coronary artery disease suspected. refused cardio     Suspected. Refused to see cardio.  Diabetes mellitus ([address] since 4/08    Foot ulcer ([facility] left     resolved    Glaucoma     Dr. Dilia Tavera History of left heart catheterization 03/29/2019    EF > 55% 3 vessel CAD as described. In particular heavily calcified critical bifurcation of LAD/diagonal in addition to being small in size.  Hx of Doppler echocardiogram 03/28/2019    EF 50% Hypokinetic lateral wall. Grade 3 diastolic dysfucunction. Mild concentric LV hypertrophy.  Mild pulmonary htn,mild mr , mod tr.    Hyperlipidemia     Left breast mass 03/22/2017    Pt seen  Hattie for that. Mammo. Neg. She thinks it scar tissue.  Malignant neoplasm of left breast ([facility] 08/31/2015    Left breast, Radiation therapy, lumpectomy 1985    Parkinson disease ([facility] 2008    dr. Louise Cantu following    Stenosis of left subclavian artery [facility])     refused w/u       Past Surgical History:   Procedure Laterality Date    APPENDECTOMY      BREAST LUMPECTOMY Left 1995    MARY AND BSO Right     TONSILLECTOMY          Social History     Socioeconomic History    Marital status:      Spouse name: Not on file    Number of children: Not on file    Years of education: Not on file    Highest education level: High school graduate   Occupational History    Not on file   Tobacco Use    Smoking status: Never Smoker    Smokeless tobacco: Never Used   Substance and Sexual Activity    Alcohol use: Never     Alcohol/week: 0.0 standard drinks    Drug use: No    Sexual activity: Not Currently     Partners: Male   Other Topics Concern    Not on file   Social History Narrative    Denies needs at this time     Social Determinants of Health     Financial Resource Strain: Low Risk     Difficulty of Paying Living Expenses: Not hard at all   Food Insecurity: No Food Insecurity    Worried About [facility] in the Last Year: Never true    [facility] in the Last Year: Never true   Transportation Needs:     Lack of Transportation (Medical): Not on file    Lack of Transportation (Non-Medical):  Not on file   Physical Activity: Insufficiently Active    Days of Exercise per Week: 7 days    Minutes of Exercise per Session: 20 min   Stress:     Feeling of Stress : Not on file   Social Connections:     Frequency of Communication with Friends and Family: Not on file    Frequency of Social Gatherings with Friends and Family: Not on file    Attends Hindu Services: Not on file    Active Member of Clubs or Organizations: Not on file    Attends Club or Organization Meetings: Not on file  Marital Status: Not on file   Intimate Partner Violence:     Fear of Current or Ex-Partner: Not on file    Emotionally Abused: Not on file    Physically Abused: Not on file    Sexually Abused: Not on file   Housing Stability:     Unable to Pay for Housing in the Last Year: Not on file    Number of [place] in the Last Year: Not on file    Unstable Housing in the Last Year: Not on file       Family History   Problem Relation Age of Onset    Heart Disease Mother     COPD Father     No Known Problems Sister     Heart Disease Brother     Other Brother         broken heart syndrome       Objective    Physical Exam:  Constitutional   Weight: well nourished  Heart/Vascular   Rate and Rhythm: RRR   Murmurs: none   Arterial Pulses:  no carotid bruits  Neck   Appearance/Palpation/Auscultation: supple  Mental Status   Orientation: oriented to person, oriented to place, oriented to problem and oriented to time   Mood/Affect: appropriate mood and appropriate affect   Memory/Other: recent memory intact, remote memory intact, fund of knowledge intact, attention span normal and concentration normal  Language   Language: (normal) language, no dysarthria, (normal) articulation and no dysphasia/aphasia  Cranial Nerves   CN II Right: visual fields appear intact   CN II Left: visual fields appear intact   CN III, IV, VI: EOM no nystagmus, normal pursuit and extraocular muscle strength normal   CN III: pupil normal size, pupil reactive to light and dark, pupil accomodates and no ptosis   CN IV: normal   CN VI: normal   CN V Right: normal sensation and muscles of mastication intact   CN V Left: normal sensation and muscles of mastication intact   CN VII Right: normal facial expression   CN VII Left: normal facial expression   CN VIII Right: hearing in tact to normal conversation   CN VIII Left: hearing in tact to normal conversation   CN IX,X: normal palatal movement   CN XI Right: normal sternocleidomastoid and normal trapezius   CN XI Left: normal sternocleidomastoid and normal trapezius   CN XII: no tremors of the tongue, no fasciculation of the tongue, tongue protrudes midline, normal power to left and normal power to right  Gait and Stance   Gait/Posture: station normal, ambulates independently, gait normal and Romberg's test normal  Motor/Coordination Exam   General: tremulous, no bradykinesia, no chorea, no athetosis, no myoclonus, no dyskinesia and Mild intention tremors bilaterally, mild resting tremor component more on the left than the right   Right Upper Extremity: normal motor strength, normal bulk and normal tone   Left Upper Extremity: normal motor strength, normal bulk and normal tone   Right Lower Extremity: normal motor strength, normal bulk and normal tone   Left Lower Extremity: normal motor strength, normal bulk and normal tone   Coordination: no drift, normal finger-to-nose and normal heel-to-shin  Reflexes   Reflexes Right: triceps 2/4, brachioradialis 2/4 and patellar 1/4   Reflexes Left: triceps 2/4, brachioradialis 2/4 and patellar 1/4  Sensory   Sensation: normal light touch, normal pinprick, normal vibration, normal DSS and no neglect  Spine   Cervical Spine: no tenderness, no dystonia  and full ROM   Thoracic Spine: no spasms, no bony abnormalities, normal curvature, no tenderness and full ROM   Low Back: full ROM, no pain, no spasms and no bony abnormalities  Lungs   Auscultation: normal breath sounds  Skin   Inspection: no jaundice, no lesions, no rashes and no cyanosis      /72 (Site: Right Upper Arm, Position: Sitting, Cuff Size: Large Adult)   Pulse 85   Ht 5' 4\" (1.626 m)   Wt 114 lb (51.7 kg)   SpO2 99%   BMI 19.57 kg/m²     Assessment and Plan     Diagnosis Orders   1. Parkinson disease ([facility]         Jose Groves is here to establish care for her Parkinson's disease. She remains stable at this time on carbidopa levodopa 25 mg / 100 mg 3 times daily.   I will not make any changes to her regimen. We discussed the importance and benefits of daily exercise for Parkinson's patients. Return in about 6 months (around 10/26/2022) for Follow-up PA/NP.     Kristin Gallo,

## 2022-05-23 ASSESSMENT — ENCOUNTER SYMPTOMS
EYES NEGATIVE: 1
GASTROINTESTINAL NEGATIVE: 1
RESPIRATORY NEGATIVE: 1

## 2022-07-01 NOTE — TELEPHONE ENCOUNTER
8-6-2019  RTO 

## 2022-07-07 RX ORDER — CLOPIDOGREL BISULFATE 75 MG/1
75 TABLET ORAL DAILY
Qty: 30 TABLET | Refills: 5 | Status: SHIPPED | OUTPATIENT
Start: 2022-07-07

## 2022-08-03 RX ORDER — CLOPIDOGREL BISULFATE 75 MG/1
TABLET ORAL
Qty: 30 TABLET | Refills: 5 | OUTPATIENT
Start: 2022-08-03

## 2022-08-08 ENCOUNTER — OFFICE VISIT (OUTPATIENT)
Dept: INTERNAL MEDICINE CLINIC | Age: 87
End: 2022-08-08
Payer: MEDICARE

## 2022-08-08 VITALS — BODY MASS INDEX: 19.95 KG/M2 | RESPIRATION RATE: 16 BRPM | WEIGHT: 116.2 LBS | TEMPERATURE: 97.1 F | HEART RATE: 84 BPM

## 2022-08-08 DIAGNOSIS — J30.9 ALLERGIC RHINITIS, UNSPECIFIED SEASONALITY, UNSPECIFIED TRIGGER: ICD-10-CM

## 2022-08-08 DIAGNOSIS — I77.1 STENOSIS OF LEFT SUBCLAVIAN ARTERY (HCC): ICD-10-CM

## 2022-08-08 DIAGNOSIS — C50.912 MALIGNANT NEOPLASM OF LEFT FEMALE BREAST, UNSPECIFIED ESTROGEN RECEPTOR STATUS, UNSPECIFIED SITE OF BREAST (HCC): ICD-10-CM

## 2022-08-08 DIAGNOSIS — H40.9 GLAUCOMA OF BOTH EYES, UNSPECIFIED GLAUCOMA TYPE: ICD-10-CM

## 2022-08-08 DIAGNOSIS — Z85.3 HISTORY OF MALIGNANT NEOPLASM OF LEFT BREAST: ICD-10-CM

## 2022-08-08 DIAGNOSIS — E78.2 MIXED HYPERLIPIDEMIA: ICD-10-CM

## 2022-08-08 DIAGNOSIS — G20 PARKINSON DISEASE (HCC): ICD-10-CM

## 2022-08-08 DIAGNOSIS — E11.9 DIABETES MELLITUS TYPE 2 IN NONOBESE (HCC): ICD-10-CM

## 2022-08-08 DIAGNOSIS — I25.119 CORONARY ARTERY DISEASE INVOLVING NATIVE CORONARY ARTERY OF NATIVE HEART WITH ANGINA PECTORIS (HCC): ICD-10-CM

## 2022-08-08 PROCEDURE — 1090F PRES/ABSN URINE INCON ASSESS: CPT | Performed by: INTERNAL MEDICINE

## 2022-08-08 PROCEDURE — 1123F ACP DISCUSS/DSCN MKR DOCD: CPT | Performed by: INTERNAL MEDICINE

## 2022-08-08 PROCEDURE — G8427 DOCREV CUR MEDS BY ELIG CLIN: HCPCS | Performed by: INTERNAL MEDICINE

## 2022-08-08 PROCEDURE — G8420 CALC BMI NORM PARAMETERS: HCPCS | Performed by: INTERNAL MEDICINE

## 2022-08-08 PROCEDURE — 99214 OFFICE O/P EST MOD 30 MIN: CPT | Performed by: INTERNAL MEDICINE

## 2022-08-08 PROCEDURE — 1036F TOBACCO NON-USER: CPT | Performed by: INTERNAL MEDICINE

## 2022-08-08 PROCEDURE — 3044F HG A1C LEVEL LT 7.0%: CPT | Performed by: INTERNAL MEDICINE

## 2022-08-08 RX ORDER — CARVEDILOL 6.25 MG/1
6.25 TABLET ORAL 2 TIMES DAILY
Qty: 180 TABLET | Refills: 1 | Status: SHIPPED | OUTPATIENT
Start: 2022-08-08

## 2022-08-08 ASSESSMENT — ENCOUNTER SYMPTOMS
ALLERGIC/IMMUNOLOGIC NEGATIVE: 1
GASTROINTESTINAL NEGATIVE: 1
RESPIRATORY NEGATIVE: 1
EYES NEGATIVE: 1

## 2022-08-08 ASSESSMENT — PATIENT HEALTH QUESTIONNAIRE - PHQ9
SUM OF ALL RESPONSES TO PHQ QUESTIONS 1-9: 0
SUM OF ALL RESPONSES TO PHQ QUESTIONS 1-9: 0
2. FEELING DOWN, DEPRESSED OR HOPELESS: 0
SUM OF ALL RESPONSES TO PHQ QUESTIONS 1-9: 0
SUM OF ALL RESPONSES TO PHQ QUESTIONS 1-9: 0
1. LITTLE INTEREST OR PLEASURE IN DOING THINGS: 0
SUM OF ALL RESPONSES TO PHQ9 QUESTIONS 1 & 2: 0

## 2022-08-08 NOTE — ASSESSMENT & PLAN NOTE
:Last hga1c is 6.3  Continue metformin : explained patient metformin is not the cause of cough   Will d/c lisinopril

## 2022-08-08 NOTE — PROGRESS NOTES
Tino David  Patient's  is 1932  Seen in office on 2022      SUBJECTIVE:  Judith Cleveland aby 80 y. o.year old female presents today   Chief Complaint   Patient presents with    Follow-up    Discuss Medications     Metformin causing her to cough/inflamation    Medication Refill       Pt is here for f/u of HTN DM HLD tremors  Pt states she has cough for the last few months > pt is on lisinopril. Patient's daughter thought metformin was causing cough and she wanted her to discuss that with me. She also has fields behind her house and that also cause cough  Patient has hypertension. Taking medications No headaches, no chest pain, no palpitations and no dizziness. Patient has hyperlipidemia. Taking medications. No abdominal pain, no nausea or vomiting. No myalgias. Patient denies any falls or injuries. She has tremors of the hands and sometimes of the head. She saw Dr. Louise Parada the neurologist and he recommended patient to continue taking Sinemet but she decided not to take them as she did not feel well after she takes Sinemet. She has not taken it for many months. She does have a follow-up appointment with neurologist  Taking medications regularly. No side effects noted. Review of Systems   Constitutional: Negative. HENT: Negative. Eyes: Negative. Respiratory: Negative. Cardiovascular: Negative. Negative for chest pain and leg swelling. Gastrointestinal: Negative. Endocrine: Negative. Genitourinary: Negative. Allergic/Immunologic: Negative. Neurological:  Positive for tremors. Hematological: Negative. Psychiatric/Behavioral: Negative. OBJECTIVE: Pulse 84   Temp 97.1 °F (36.2 °C) (Temporal)   Resp 16   Wt 116 lb 3.2 oz (52.7 kg)   BMI 19.95 kg/m²     Wt Readings from Last 3 Encounters:   22 116 lb 3.2 oz (52.7 kg)   22 117 lb (53.1 kg)   22 114 lb (51.7 kg)      Patient was seen taking COVID-19 precautions. Face mask, gloves were used.   Patient also wore facemask. GENERAL: - Alert, oriented, pleasant, in no apparent distress. HEENT: - Conjunctiva pink, no scleral icterus. ENT clear. NECK: -Supple. No jugular venous distention noted. No masses felt,  CARDIOVASCULAR: - Normal S1 and S2    PULMONARY: - No respiratory distress. No wheezes or rales. ABDOMEN: - Soft and non-tender,no masses  ororganomegaly. EXTREMITIES: - No cyanosis, clubbing, or significant edema. SKIN: Skin is warm and dry. NEUROLOGICAL: - Cranial nerves II through XII are grossly intact. Patient has tremors of the hands. IMPRESSION:    Encounter Diagnoses   Name Primary? Mixed hyperlipidemia     Stenosis of left subclavian artery     Parkinson disease (HCC)     Diabetes mellitus type 2 in nonobese (HCC)     History of malignant neoplasm of left breast     Glaucoma of both eyes, unspecified glaucoma type     Coronary artery disease involving native coronary artery of native heart with angina pectoris ([facility])     Allergic rhinitis, unspecified seasonality, unspecified trigger     Malignant neoplasm of left female breast, unspecified estrogen receptor status, unspecified site of breast ([address]        ASSESSMENT/PLAN:    1. Mixed hyperlipidemia  Overview:  -pt is on atorvastatin  40 mg daily. Follow diet and exercise  Assessment & Plan:    : lipid profile is good. Continue same   2. Stenosis of left subclavian artery  Overview:  Pulse weaker on left radial  Carotid and subclavian bruit on left side. refused w/u  Discussed again still refused  Assessment & Plan:    Patient is stable. Continue current treatment. No pain in the left arm   3. Parkinson disease ([address]  Overview:  She is seeing Dr Inez Crum, but doctor moved  Pt took sinemet but stopped it as it caused faigue  Referred to Dr Nat Muhammad. Recommended to continue SInemet but is not taking  Has f/u  appt   Assessment & Plan:    : As above. Does not want to take Sinemet. Has appt with neurologist   4.  Diabetes mellitus type tablet Take 1 tablet by mouth daily 30 tablet 5    lisinopril (PRINIVIL;ZESTRIL) 5 MG tablet Take 1 tablet by mouth daily 30 tablet 5    metFORMIN (GLUCOPHAGE-XR) 500 MG extended release tablet Take 1 tablet by mouth 2 times daily (with meals) 180 tablet 1    nitroGLYCERIN (NITROSTAT) 0.4 MG SL tablet up to max of 3 total doses. If no relief after 1 dose, call 911. 25 tablet 3    carvedilol (COREG) 6.25 MG tablet Take 1 tablet by mouth 2 times daily 180 tablet 3    fluticasone (FLONASE) 50 MCG/ACT nasal spray SPRAY 1 SPRAY BY NASAL ROUTE EVERY DAY 1 each 3    atorvastatin (LIPITOR) 40 MG tablet Take 1 tablet by mouth nightly 30 tablet 5    blood glucose test strips (FREESTYLE LITE) strip PATIENT TESTING ONCE DAILY AND AS NEEDED, DX=E11.9 50 strip 3    dorzolamide-timolol (COSOPT) 22.3-6.8 MG/ML ophthalmic solution Place 1 drop into both eyes 2 times daily  3    aspirin 81 MG tablet Take 81 mg by mouth daily. acetaZOLAMIDE (DIAMOX) 500 MG extended release capsule Take 500 mg by mouth daily  (Patient not taking: Reported on 8/8/2022)       No current facility-administered medications for this visit. Past Medical History:   Diagnosis Date    CAD (coronary artery disease)     suspected, refused w/u    Coronary artery disease suspected. refused cardio     Suspected. Refused to see cardio. Diabetes mellitus ([address] since 4/08    Foot ulcer ([facility] left     resolved    Glaucoma     Dr. Sridhar Pandey    History of left heart catheterization 03/29/2019    EF > 55% 3 vessel CAD as described. In particular heavily calcified critical bifurcation of LAD/diagonal in addition to being small in size. Hx of Doppler echocardiogram 03/28/2019    EF 50% Hypokinetic lateral wall. Grade 3 diastolic dysfucunction. Mild concentric LV hypertrophy. Mild pulmonary htn,mild mr , mod tr. Hyperlipidemia     Left breast mass 03/22/2017    Pt seen Dr Ferrel Cranker for that. Mammo. Neg. She thinks it scar tissue.      Malignant neoplasm of left breast ([facility] 08/31/2015    Left breast, Radiation therapy, lumpectomy 1985    Parkinson disease ([facility] 2008    dr. Acie Gottron following    Stenosis of left subclavian artery ([facility]     refused w/u     Past Surgical History:   Procedure Laterality Date    APPENDECTOMY      BREAST LUMPECTOMY Left 1995    TONSILLECTOMY      TOTAL ABDOMINAL HYSTERECTOMY W/ BILATERAL SALPINGOOPHORECTOMY Right      Social History     Tobacco Use    Smoking status: Never    Smokeless tobacco: Never   Substance Use Topics    Alcohol use: Never     Alcohol/week: 0.0 standard drinks       LAB REVIEW:  CBC:   Lab Results   Component Value Date/Time    WBC 6.4 10/09/2020 11:49 AM    HGB 11.5 10/09/2020 11:49 AM    HCT 34.8 10/09/2020 11:49 AM     10/09/2020 11:49 AM     Lipids:   Lab Results   Component Value Date    HDL 49 05/02/2022    LDLCALC 60 05/02/2022    LDLDIRECT 117 (H) 10/12/2018    TRIGLYCFAST 120 05/02/2022    CHOLFAST 133 05/02/2022     Renal:   Lab Results   Component Value Date/Time    BUN 19 05/02/2022 10:08 AM    CREATININE 0.8 05/02/2022 10:08 AM     05/02/2022 10:08 AM    K 4.6 05/02/2022 10:08 AM    ALT <5 05/02/2022 10:08 AM    AST 10 05/02/2022 10:08 AM    GLUCOSE 132 05/02/2022 10:08 AM    GLUF 169 03/12/2018 12:30 PM     PT/INR: No results found for: INR  A1C:   Lab Results   Component Value Date    LABA1C 6.3 05/02/2022           Wil Solorio MD, 8/8/2022 , 11:08 AM

## 2022-10-03 ENCOUNTER — TELEPHONE (OUTPATIENT)
Dept: INTERNAL MEDICINE CLINIC | Age: 87
End: 2022-10-03

## 2022-10-03 NOTE — TELEPHONE ENCOUNTER
Call patient  Patient states she is having difficulty swallowing since last Friday. Patient ate turkey and after that she is not able to bring her eat anything it comes back. She denies any chest pain. No nausea  Explained to the patient may be food is stuck daily in the esophagus and go to the emergency room  Patient states she will go tomorrow if her symptoms get worse she will go. Patient's daughter is with the patient. She is taking only small amount of sips of water. Patient states she is still comfortable. Advised her to go to the ER and call me tomorrow morning.

## 2022-10-03 NOTE — TELEPHONE ENCOUNTER
Patients daughter called, pt. Has been having clear flim/mucus come up when drinking, pt. Unable to keep fluid down with drinking, has been having ice chips and saltine crackers yesterday. Please advise.

## 2022-10-27 ENCOUNTER — OFFICE VISIT (OUTPATIENT)
Dept: NEUROLOGY | Age: 87
End: 2022-10-27
Payer: MEDICARE

## 2022-10-27 VITALS
SYSTOLIC BLOOD PRESSURE: 112 MMHG | HEART RATE: 87 BPM | OXYGEN SATURATION: 99 % | HEIGHT: 64 IN | WEIGHT: 114 LBS | DIASTOLIC BLOOD PRESSURE: 78 MMHG | BODY MASS INDEX: 19.46 KG/M2

## 2022-10-27 DIAGNOSIS — G25.0 BENIGN ESSENTIAL TREMOR: ICD-10-CM

## 2022-10-27 DIAGNOSIS — G20 PARKINSON DISEASE (HCC): Primary | ICD-10-CM

## 2022-10-27 PROCEDURE — 1090F PRES/ABSN URINE INCON ASSESS: CPT | Performed by: NURSE PRACTITIONER

## 2022-10-27 PROCEDURE — 1036F TOBACCO NON-USER: CPT | Performed by: NURSE PRACTITIONER

## 2022-10-27 PROCEDURE — G8484 FLU IMMUNIZE NO ADMIN: HCPCS | Performed by: NURSE PRACTITIONER

## 2022-10-27 PROCEDURE — G8427 DOCREV CUR MEDS BY ELIG CLIN: HCPCS | Performed by: NURSE PRACTITIONER

## 2022-10-27 PROCEDURE — G8420 CALC BMI NORM PARAMETERS: HCPCS | Performed by: NURSE PRACTITIONER

## 2022-10-27 PROCEDURE — 1123F ACP DISCUSS/DSCN MKR DOCD: CPT | Performed by: NURSE PRACTITIONER

## 2022-10-27 PROCEDURE — 99213 OFFICE O/P EST LOW 20 MIN: CPT | Performed by: NURSE PRACTITIONER

## 2022-10-27 NOTE — PROGRESS NOTES
10/27/22    Thomas Noe  11/26/1932    Chief Complaint   Patient presents with    Follow-up     Parkinson's       History of Present Illness  Nalini Harper is a 80 y.o. female presenting today for follow-up of: Parkinson's. She takes carbidopa levodopa 25/100 mg 1 tablet 3 times daily. Nalini Harper is accompanied by her daughter. Nalini Harper tells me she is not taking any medications for her Parkinson's disease or essential tremor. She tells me she notices that her tremor is worse when she is stressed. 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 She denies any difficulty with slowness, stiffness, gait changes. Her worst symptom is her tremor in her bilateral hands and head. Again, this is worsened with stress however it does not bother her or affect her quality of life. Nalini Harper denies any falls, she walks with a cane. Current Outpatient Medications   Medication Sig Dispense Refill    carvedilol (COREG) 6.25 MG tablet Take 1 tablet by mouth in the morning and 1 tablet before bedtime. 180 tablet 1    clopidogrel (PLAVIX) 75 MG tablet Take 1 tablet by mouth daily 30 tablet 5    metFORMIN (GLUCOPHAGE-XR) 500 MG extended release tablet Take 1 tablet by mouth 2 times daily (with meals) 180 tablet 1    nitroGLYCERIN (NITROSTAT) 0.4 MG SL tablet up to max of 3 total doses. If no relief after 1 dose, call 911. 25 tablet 3    fluticasone (FLONASE) 50 MCG/ACT nasal spray SPRAY 1 SPRAY BY NASAL ROUTE EVERY DAY 1 each 3    atorvastatin (LIPITOR) 40 MG tablet Take 1 tablet by mouth nightly 30 tablet 5    blood glucose test strips (FREESTYLE LITE) strip PATIENT TESTING ONCE DAILY AND AS NEEDED, DX=E11.9 50 strip 3    dorzolamide-timolol (COSOPT) 22.3-6.8 MG/ML ophthalmic solution Place 1 drop into both eyes 2 times daily  3    aspirin 81 MG tablet Take 81 mg by mouth daily. No current facility-administered medications for this visit. Physical Exam:  Also present during visit: daughter.   Constitutional Weight: well nourished  Mental Status              Orientation: oriented to person, oriented to place, oriented to problem and oriented to time              Mood/Affect: appropriate mood and appropriate affect              Memory/Other: recent memory intact, remote memory intact, fund of knowledge intact, attention span normal and concentration normal  Language              Language: (normal) language, no dysarthria, (normal) articulation and no dysphasia/aphasia  Cranial Nerves              CN II Right: visual fields appear intact              CN II Left: visual fields appear intact              CN III, IV, VI: EOM no nystagmus, normal pursuit and extraocular muscle strength normal              CN III: pupil normal size, pupil reactive to light and dark, pupil accomodates and no ptosis              CN IV: normal              CN VI: normal              CN V Right: normal sensation and muscles of mastication intact              CN V Left: normal sensation and muscles of mastication intact              CN VII Right: normal facial expression              CN VII Left: normal facial expression              CN VIII Right: hearing in tact to normal conversation              CN VIII Left: hearing in tact to normal conversation              CN IX,X: normal palatal movement              CN XI Right: normal sternocleidomastoid and normal trapezius              CN XI Left: normal sternocleidomastoid and normal trapezius              CN XII: no tremors of the tongue, no fasciculation of the tongue, tongue protrudes midline, normal power to left and normal power to right  Gait and Stance              Gait/Posture: station normal, ambulates independently, gait normal and Romberg's test normal  Motor/Coordination Exam              General: tremulous, no bradykinesia, no chorea, no athetosis, no myoclonus, no dyskinesia and Mild intention tremors bilaterally, mild resting tremor component more on the left than the right Right Upper Extremity: normal motor strength, normal bulk and normal tone              Left Upper Extremity: normal motor strength, normal bulk and normal tone              Right Lower Extremity: normal motor strength, normal bulk and normal tone              Left Lower Extremity: normal motor strength, normal bulk and normal tone              Coordination: no drift, normal finger-to-nose and normal heel-to-shin  Reflexes              Reflexes Right: triceps 2/4, brachioradialis 2/4 and patellar 1/4              Reflexes Left: triceps 2/4, brachioradialis 2/4 and patellar 1/4  Sensory              Sensation: normal light touch, normal pinprick, normal vibration, normal DSS and no negle          /78 (Site: Left Upper Arm, Position: Sitting, Cuff Size: Medium Adult)   Pulse 87   Ht 5' 4\" (1.626 m)   Wt 114 lb (51.7 kg)   SpO2 99%   BMI 19.57 kg/m²     Assessment and Plan     Diagnosis Orders   1. Parkinson disease ([facility] [address]        2. Benign essential tremor        Janifer Goldmann was seen in neurological follow-up for Parkinson's disease, essential tremor. She tells me today she is not taking any medications for her symptoms for her Parkinson's disease or essential tremor. She was taking primidone, clonazepam for her tremor and carbidopa levodopa 25 100 mg 1 tablet 3 times daily for her motor symptoms. Janifer Goldmann tells me she has not been taking these medications now for months. She tells me her tremor only bothers her when she is stressed because she notices it is more prominent. However, it does not affect her quality of life. We discussed different medications however I believe the side effects would not be worth the benefit of the medication. Janifer Goldmann did not have any difficulty getting up from her chair, she had good speed, slight stooped posture, subtle en bloc turning, subtle shuffling gait. She denies any freezing episodes or increased bradykinesia, rigidity. She has not taken Sinemet for months.   Janifer Goldmann will be 80years old soon. We agreed that the best management for Michelle Caraballo is to control her underlying stress/anxiety that would help decrease the tremor. Her daughter agrees that she notices her tremor more when Michelle Caraballo stressed . I will defer this to her PCP. Michelle Caraballo is stable from a neurological standpoint. She is content with her motor symptoms and not being on medication. Michelle Caraballo will be seen on an as-needed basis and we will be happy to address any new or concerning symptoms. Return if symptoms worsen or fail to improve.     Jose Rafael Owens, APRN - CNP

## 2022-11-02 ENCOUNTER — OFFICE VISIT (OUTPATIENT)
Dept: CARDIOLOGY CLINIC | Age: 87
End: 2022-11-02
Payer: MEDICARE

## 2022-11-02 ENCOUNTER — TELEPHONE (OUTPATIENT)
Dept: CARDIOLOGY CLINIC | Age: 87
End: 2022-11-02

## 2022-11-02 VITALS
HEIGHT: 64 IN | SYSTOLIC BLOOD PRESSURE: 138 MMHG | BODY MASS INDEX: 20.14 KG/M2 | HEART RATE: 72 BPM | WEIGHT: 118 LBS | DIASTOLIC BLOOD PRESSURE: 70 MMHG

## 2022-11-02 DIAGNOSIS — E78.2 MIXED HYPERLIPIDEMIA: ICD-10-CM

## 2022-11-02 DIAGNOSIS — E11.9 DIABETES MELLITUS TYPE 2 IN NONOBESE (HCC): ICD-10-CM

## 2022-11-02 DIAGNOSIS — I77.1 STENOSIS OF LEFT SUBCLAVIAN ARTERY (HCC): ICD-10-CM

## 2022-11-02 DIAGNOSIS — I25.119 CORONARY ARTERY DISEASE INVOLVING NATIVE CORONARY ARTERY OF NATIVE HEART WITH ANGINA PECTORIS (HCC): Primary | ICD-10-CM

## 2022-11-02 PROCEDURE — G8427 DOCREV CUR MEDS BY ELIG CLIN: HCPCS | Performed by: INTERNAL MEDICINE

## 2022-11-02 PROCEDURE — 1123F ACP DISCUSS/DSCN MKR DOCD: CPT | Performed by: INTERNAL MEDICINE

## 2022-11-02 PROCEDURE — 99213 OFFICE O/P EST LOW 20 MIN: CPT | Performed by: INTERNAL MEDICINE

## 2022-11-02 PROCEDURE — G8484 FLU IMMUNIZE NO ADMIN: HCPCS | Performed by: INTERNAL MEDICINE

## 2022-11-02 PROCEDURE — G8420 CALC BMI NORM PARAMETERS: HCPCS | Performed by: INTERNAL MEDICINE

## 2022-11-02 PROCEDURE — 1036F TOBACCO NON-USER: CPT | Performed by: INTERNAL MEDICINE

## 2022-11-02 PROCEDURE — 3044F HG A1C LEVEL LT 7.0%: CPT | Performed by: INTERNAL MEDICINE

## 2022-11-02 PROCEDURE — 1090F PRES/ABSN URINE INCON ASSESS: CPT | Performed by: INTERNAL MEDICINE

## 2022-11-02 NOTE — PATIENT INSTRUCTIONS
CORONARY ARTERY DISEASE:Yes. Patient had declined CABG. clinically stable. Patient is on optimal medical regimen ( see medication list above )  -  Patient is currently  asymptomatic from CAD. - changes in  treatment: no, on ASA, Plavix & Coreg           - Testing ordered:  no  [facility] classification: 1  CATH 3/2019:   1. 3 vessel CAD as described. In particular Heavily Calcified,   critical bifurcation disease of LAD/ Diagonal in addition to   being small in size. 2. Normal LV systolic function. LVEF is > 55 %. Patient tolerated the procedure well. No immediate complications. Recommendations   Urgent surgical CABG (required before discharge, patient stable   with medical management). HTN:well controlled on current medical regimen, see list above. - changes in  treatment:   no, on Coreg & Lipitor   CARDIOMYOPATHY: None known   CONGESTIVE HEART FAILURE: NO KNOWN HISTORY.     VHD: No significant VHD noted  ECHO 3/2019   Left ventricular function is normal, EF is estimated at 50%. Hypokinetic lateral wall. Grade III diastolic dysfunction. Mild concentric left ventricular hypertrophy. Right ventricular systolic pressure of 39 mm Hg suggestive of mild pulmonary   hypertension. Mild mitral regurgitation is present. Moderate tricuspid regurgitation is present. No evidence of any pericardial effusion. DYSLIPIDEMIA: Patient's profile is at / near [address] current medical regimen wellyes, on Lipitor                                                              See most recent Lab values in Labs section above. PAD:  Known Left Subclavian stenosis. Probable Left Carotid stenosis. Carotid Bruit: Patient still does not want any testing or surgery. Wants medical management as for her Carotid AD problem as well. Left subclavian stenosis: continue medical management.      TESTS ORDERED:None this visit PREVIOUSLY ORDERED TESTS REVIEWED & DISCUSSED WITH THE PATIENT:     I personally reviewed & interpreted, all previously ordered tests as copied above. Latest Labs are pulled in to the note with dates. Labs, specially in Reference to Lipid profile, Cardiac testing in the form of Echo ( dated: ), stress tests ( dated: ) & other relevant cardiac testing reviewed with patient & recommendations made based on assessment of the results. Discussed role of Cardiac risk factors & effects + treatment of co morbidities with patient & advised accordingly. MEDICATIONS: List of medications patient is currently taking is reviewed in detail with the patient. Discussed any side effects or problems taking the medication. Recommend Continue present management & medications as listed. AFFIRMATION: I spent at least 20 minutes of time reviewing patient's history, previous & current medical problems & all Labs + testing. This includes chart prep even prior to the vosit. Various goals are discussed and multiple questions answered. Relevant concelling performed.

## 2022-11-02 NOTE — PROGRESS NOTES
OFFICE PROGRESS NOTES      Pretty Ham is a 80 y.o. female who has    CHIEF COMPLAINT AS FOLLOWS:  CHEST PAIN:Patient denies any C/O chest pains at this time. SOB: No C/O SOB at this time. LEG EDEMA: No leg edema   PALPITATIONS: Denies any C/O Palpitations   DIZZINESS: No C/O Dizziness   SYNCOPE: None   OTHER/ ADDITIONAL COMPLAINTS:                                     HPI: Patient is here for F/U on her CAD, HTN & Dyslipidemia problems. CAD: Patient has known CAD. Patient had Refused W/U. 3/2019-Pt had refused CABG and any intervention, opted for med mx. HTN: Patient has known essential HTN. Has been treated with guideline recommended medical / physical/ diet therapy as stated below. Dyslipidemia: Patient has known mixed dyslipidemia. Has been treated with guideline recommended medical / physical/ diet therapy as stated below. Current Outpatient Medications   Medication Sig Dispense Refill    carvedilol (COREG) 6.25 MG tablet Take 1 tablet by mouth in the morning and 1 tablet before bedtime. 180 tablet 1    clopidogrel (PLAVIX) 75 MG tablet Take 1 tablet by mouth daily 30 tablet 5    metFORMIN (GLUCOPHAGE-XR) 500 MG extended release tablet Take 1 tablet by mouth 2 times daily (with meals) 180 tablet 1    fluticasone (FLONASE) 50 MCG/ACT nasal spray SPRAY 1 SPRAY BY NASAL ROUTE EVERY DAY 1 each 3    atorvastatin (LIPITOR) 40 MG tablet Take 1 tablet by mouth nightly 30 tablet 5    blood glucose test strips (FREESTYLE LITE) strip PATIENT TESTING ONCE DAILY AND AS NEEDED, DX=E11.9 50 strip 3    dorzolamide-timolol (COSOPT) 22.3-6.8 MG/ML ophthalmic solution Place 1 drop into both eyes 2 times daily  3    aspirin 81 MG tablet Take 81 mg by mouth daily. nitroGLYCERIN (NITROSTAT) 0.4 MG SL tablet up to max of 3 total doses. If no relief after 1 dose, call 911. (Patient not taking: Reported on 11/2/2022) 25 tablet 3     No current facility-administered medications for this visit. Allergies: Eggs or egg-derived products [eggs or egg-derived products] and Iodine  Review of Systems:    Constitutional: Negative for diaphoresis and fatigue  Respiratory: Negative for shortness of breath  Cardiovascular: Negative for chest pain, dyspnea on exertion, claudication, edema, irregular heartbeat, murmur, palpitations or shortness of breath  Musculoskeletal: Negative for muscle pain, muscular weakness, negative for pain in arm and leg or swelling in foot and leg    Objective:  /70   Pulse 72   Ht 5' 4\" (1.626 m)   Wt 118 lb (53.5 kg)   BMI 20.25 kg/m²   Wt Readings from Last 3 Encounters:   11/02/22 118 lb (53.5 kg)   10/27/22 114 lb (51.7 kg)   08/08/22 116 lb 3.2 oz (52.7 kg)     Body mass index is 20.25 kg/m². GENERAL - Alert, oriented, pleasant, in no apparent distress. EYES: No jaundice, no conjunctival pallor. Neck - Supple. No jugular venous distention noted. No carotid bruits. Cardiovascular - Normal S1 and S2 without obvious murmur or gallop. Extremities - No cyanosis, clubbing, or significant edema. Pulmonary - No respiratory distress. No wheezes or rales.       MEDICAL DECISION MAKING & DATA REVIEW:    Lab Review   Lab Results   Component Value Date/Time    TROPONINT 0.314 03/28/2019 09:45 AM    TROPONINT 0.278 03/28/2019 03:30 AM     No results found for: BNP, PROBNP  No results found for: INR  Lab Results   Component Value Date    LABA1C 6.3 05/02/2022    LABA1C 6.1 11/02/2021     Lab Results   Component Value Date    WBC 6.4 10/09/2020    WBC 6.5 03/31/2019    HCT 34.8 (L) 10/09/2020    HCT 36.6 (L) 03/31/2019    MCV 83.7 10/09/2020    MCV 90.1 03/31/2019     10/09/2020     03/31/2019     Lab Results   Component Value Date    CHOL 161 03/17/2017    CHOL 154 09/12/2016    TRIG 71 03/17/2017    TRIG 79 09/12/2016    HDL 49 05/02/2022    HDL 54 11/02/2021    LDLCALC 60 05/02/2022    LDLCALC 73 11/02/2021    LDLDIRECT 117 (H) 10/12/2018    LDLDIRECT 105 (H) 03/12/2018     Lab Results   Component Value Date    ALT <5 (L) 05/02/2022    ALT 10 11/02/2021    AST 10 (L) 05/02/2022    AST 15 11/02/2021     BMP:    Lab Results   Component Value Date/Time     05/02/2022 10:08 AM     11/02/2021 10:18 AM    K 4.6 05/02/2022 10:08 AM    K 4.7 11/02/2021 10:18 AM     05/02/2022 10:08 AM     11/02/2021 10:18 AM    CO2 25 05/02/2022 10:08 AM    CO2 25 11/02/2021 10:18 AM    BUN 19 05/02/2022 10:08 AM    BUN 19 11/02/2021 10:18 AM    CREATININE 0.8 05/02/2022 10:08 AM    CREATININE 0.9 11/02/2021 10:18 AM     CMP:   Lab Results   Component Value Date/Time     05/02/2022 10:08 AM     11/02/2021 10:18 AM    K 4.6 05/02/2022 10:08 AM    K 4.7 11/02/2021 10:18 AM     05/02/2022 10:08 AM     11/02/2021 10:18 AM    CO2 25 05/02/2022 10:08 AM    CO2 25 11/02/2021 10:18 AM    BUN 19 05/02/2022 10:08 AM    BUN 19 11/02/2021 10:18 AM    CREATININE 0.8 05/02/2022 10:08 AM    CREATININE 0.9 11/02/2021 10:18 AM    PROT 7.1 05/02/2022 10:08 AM    PROT 7.1 11/02/2021 10:18 AM    PROT 7.3 01/15/2013 09:35 AM    PROT 7.2 10/06/2012 09:10 AM     No results found for: TSH, TSHHS    QUALITY MEASURES REVIEWED:  1.CAD:Patient is taking anti platelet agent:Yes  2. DYSLIPIDEMIA: Patient is on cholesterol lowering medication:Yes   3. Beta-Blocker therapy for CAD, if prior Myocardial Infarction:Yes   4. Counselled regarding smoking cessation. No   Patient does not Smoke. 5.Anticoagulation therapy (for A.Fib) No   Does Not have A.Fib.  6.Discussed weight management strategies. Assessment & Plan:  Primary / Secondary prevention is the goal by aggressive risk modification, healthy and therapeutic life style changes for cardiovascular risk reduction. CORONARY ARTERY DISEASE:Yes. Patient had declined CABG. clinically stable. Patient is on optimal medical regimen ( see medication list above )  -  Patient is currently  asymptomatic from CAD.             - changes in  treatment: no, on ASA, Plavix & Coreg           - Testing ordered:  no  [facility] classification: 1  CATH 3/2019:   1. 3 vessel CAD as described. In particular Heavily Calcified,   critical bifurcation disease of LAD/ Diagonal in addition to   being small in size. 2. Normal LV systolic function. LVEF is > 55 %. Patient tolerated the procedure well. No immediate complications. Recommendations   Urgent surgical CABG (required before discharge, patient stable   with medical management). HTN:well controlled on current medical regimen, see list above. - changes in  treatment:   no, on Coreg & Lipitor   CARDIOMYOPATHY: None known   CONGESTIVE HEART FAILURE: NO KNOWN HISTORY.     VHD: No significant VHD noted  ECHO 3/2019   Left ventricular function is normal, EF is estimated at 50%. Hypokinetic lateral wall. Grade III diastolic dysfunction. Mild concentric left ventricular hypertrophy. Right ventricular systolic pressure of 39 mm Hg suggestive of mild pulmonary   hypertension. Mild mitral regurgitation is present. Moderate tricuspid regurgitation is present. No evidence of any pericardial effusion. DYSLIPIDEMIA: Patient's profile is at / near [address] current medical regimen wellyes, on Lipitor                                                              See most recent Lab values in Labs section above. PAD:  Known Left Subclavian stenosis. Probable Left Carotid stenosis. Carotid Bruit: Patient still does not want any testing or surgery. Wants medical management as for her Carotid AD problem as well. Left subclavian stenosis: continue medical management. TESTS ORDERED:None this visit     PREVIOUSLY ORDERED TESTS REVIEWED & DISCUSSED WITH THE PATIENT:     I personally reviewed & interpreted, all previously ordered tests as copied above. Latest Labs are pulled in to the note with dates.    Labs, specially in Reference to Lipid profile, Cardiac testing in the form of Echo ( dated: ), stress tests ( dated: ) & other relevant cardiac testing reviewed with patient & recommendations made based on assessment of the results. Discussed role of Cardiac risk factors & effects + treatment of co morbidities with patient & advised accordingly. MEDICATIONS: List of medications patient is currently taking is reviewed in detail with the patient. Discussed any side effects or problems taking the medication. Recommend Continue present management & medications as listed. AFFIRMATION: I spent at least 20 minutes of time reviewing patient's history, previous & current medical problems & all Labs + testing. This includes chart prep even prior to the vosit. Various goals are discussed and multiple questions answered. Relevant concelling performed. Office follow up in six months.

## 2022-11-02 NOTE — LETTER
[address]  [address] [id number]  Phone: [phone]  Fax: [phone]    Sahil Hidalgo MD    November 2, 2022     Eugene Kilgore MD  / [address] [id number]    Patient: Tiffanie Davies   MR Number: [id number]   YOB: 1932   Date of Visit: 11/2/2022       Dear Eugene Kilgore: Thank you for referring Jesús Balbuena to me for evaluation/treatment. Below are the relevant portions of my assessment and plan of care. If you have questions, please do not hesitate to call me. I look forward to following Carolin Vizcaino along with you.     Sincerely,      Sahil Hidalgo MD

## 2022-11-07 ENCOUNTER — OFFICE VISIT (OUTPATIENT)
Dept: INTERNAL MEDICINE CLINIC | Age: 87
End: 2022-11-07
Payer: MEDICARE

## 2022-11-07 VITALS
HEART RATE: 82 BPM | SYSTOLIC BLOOD PRESSURE: 116 MMHG | TEMPERATURE: 97 F | RESPIRATION RATE: 12 BRPM | DIASTOLIC BLOOD PRESSURE: 64 MMHG | BODY MASS INDEX: 20.25 KG/M2 | OXYGEN SATURATION: 97 % | WEIGHT: 118 LBS

## 2022-11-07 DIAGNOSIS — J30.9 ALLERGIC RHINITIS, UNSPECIFIED SEASONALITY, UNSPECIFIED TRIGGER: ICD-10-CM

## 2022-11-07 DIAGNOSIS — I77.1 STENOSIS OF LEFT SUBCLAVIAN ARTERY (HCC): ICD-10-CM

## 2022-11-07 DIAGNOSIS — I25.119 CORONARY ARTERY DISEASE INVOLVING NATIVE CORONARY ARTERY OF NATIVE HEART WITH ANGINA PECTORIS (HCC): ICD-10-CM

## 2022-11-07 DIAGNOSIS — Z85.3 HISTORY OF MALIGNANT NEOPLASM OF LEFT BREAST: ICD-10-CM

## 2022-11-07 DIAGNOSIS — E78.2 MIXED HYPERLIPIDEMIA: Primary | ICD-10-CM

## 2022-11-07 DIAGNOSIS — G20 PARKINSON DISEASE (HCC): ICD-10-CM

## 2022-11-07 DIAGNOSIS — H40.9 GLAUCOMA OF BOTH EYES, UNSPECIFIED GLAUCOMA TYPE: ICD-10-CM

## 2022-11-07 DIAGNOSIS — E11.9 DIABETES MELLITUS TYPE 2 IN NONOBESE (HCC): ICD-10-CM

## 2022-11-07 PROCEDURE — 1090F PRES/ABSN URINE INCON ASSESS: CPT | Performed by: INTERNAL MEDICINE

## 2022-11-07 PROCEDURE — 36415 COLL VENOUS BLD VENIPUNCTURE: CPT | Performed by: INTERNAL MEDICINE

## 2022-11-07 PROCEDURE — 3044F HG A1C LEVEL LT 7.0%: CPT | Performed by: INTERNAL MEDICINE

## 2022-11-07 PROCEDURE — G8420 CALC BMI NORM PARAMETERS: HCPCS | Performed by: INTERNAL MEDICINE

## 2022-11-07 PROCEDURE — 1036F TOBACCO NON-USER: CPT | Performed by: INTERNAL MEDICINE

## 2022-11-07 PROCEDURE — 1123F ACP DISCUSS/DSCN MKR DOCD: CPT | Performed by: INTERNAL MEDICINE

## 2022-11-07 PROCEDURE — G8484 FLU IMMUNIZE NO ADMIN: HCPCS | Performed by: INTERNAL MEDICINE

## 2022-11-07 PROCEDURE — 99214 OFFICE O/P EST MOD 30 MIN: CPT | Performed by: INTERNAL MEDICINE

## 2022-11-07 PROCEDURE — G8427 DOCREV CUR MEDS BY ELIG CLIN: HCPCS | Performed by: INTERNAL MEDICINE

## 2022-11-07 ASSESSMENT — PATIENT HEALTH QUESTIONNAIRE - PHQ9
SUM OF ALL RESPONSES TO PHQ QUESTIONS 1-9: 0
2. FEELING DOWN, DEPRESSED OR HOPELESS: 0
SUM OF ALL RESPONSES TO PHQ9 QUESTIONS 1 & 2: 0
1. LITTLE INTEREST OR PLEASURE IN DOING THINGS: 0
SUM OF ALL RESPONSES TO PHQ QUESTIONS 1-9: 0

## 2022-11-08 LAB
A/G RATIO: 1.8 (ref 1.1–2.2)
ALBUMIN SERPL-MCNC: 4.3 G/DL (ref 3.4–5)
ALP BLD-CCNC: 80 U/L (ref 40–129)
ALT SERPL-CCNC: 10 U/L (ref 10–40)
ANION GAP SERPL CALCULATED.3IONS-SCNC: 9 MMOL/L (ref 3–16)
AST SERPL-CCNC: 13 U/L (ref 15–37)
BASOPHILS ABSOLUTE: 0 K/UL (ref 0–0.2)
BASOPHILS RELATIVE PERCENT: 0.4 %
BILIRUB SERPL-MCNC: 0.3 MG/DL (ref 0–1)
BUN BLDV-MCNC: 16 MG/DL (ref 7–20)
CALCIUM SERPL-MCNC: 9.7 MG/DL (ref 8.3–10.6)
CHLORIDE BLD-SCNC: 105 MMOL/L (ref 99–110)
CHOLESTEROL, FASTING: 183 MG/DL (ref 0–199)
CO2: 27 MMOL/L (ref 21–32)
CREAT SERPL-MCNC: 0.8 MG/DL (ref 0.6–1.2)
EOSINOPHILS ABSOLUTE: 0.4 K/UL (ref 0–0.6)
EOSINOPHILS RELATIVE PERCENT: 6.3 %
ESTIMATED AVERAGE GLUCOSE: 131.2 MG/DL
GFR SERPL CREATININE-BSD FRML MDRD: >60 ML/MIN/{1.73_M2}
GLUCOSE BLD-MCNC: 119 MG/DL (ref 70–99)
HBA1C MFR BLD: 6.2 %
HCT VFR BLD CALC: 39.1 % (ref 36–48)
HDLC SERPL-MCNC: 45 MG/DL (ref 40–60)
HEMOGLOBIN: 12.9 G/DL (ref 12–16)
LDL CHOLESTEROL CALCULATED: 113 MG/DL
LYMPHOCYTES ABSOLUTE: 1.4 K/UL (ref 1–5.1)
LYMPHOCYTES RELATIVE PERCENT: 21.1 %
MCH RBC QN AUTO: 27.9 PG (ref 26–34)
MCHC RBC AUTO-ENTMCNC: 33 G/DL (ref 31–36)
MCV RBC AUTO: 84.6 FL (ref 80–100)
MONOCYTES ABSOLUTE: 0.5 K/UL (ref 0–1.3)
MONOCYTES RELATIVE PERCENT: 7.3 %
NEUTROPHILS ABSOLUTE: 4.3 K/UL (ref 1.7–7.7)
NEUTROPHILS RELATIVE PERCENT: 64.9 %
PDW BLD-RTO: 14.2 % (ref 12.4–15.4)
PLATELET # BLD: 251 K/UL (ref 135–450)
PMV BLD AUTO: 9.7 FL (ref 5–10.5)
POTASSIUM SERPL-SCNC: 4.7 MMOL/L (ref 3.5–5.1)
RBC # BLD: 4.62 M/UL (ref 4–5.2)
SODIUM BLD-SCNC: 141 MMOL/L (ref 136–145)
TOTAL PROTEIN: 6.7 G/DL (ref 6.4–8.2)
TRIGLYCERIDE, FASTING: 127 MG/DL (ref 0–150)
VLDLC SERPL CALC-MCNC: 25 MG/DL
WBC # BLD: 6.6 K/UL (ref 4–11)

## 2022-11-21 RX ORDER — CARVEDILOL 6.25 MG/1
6.25 TABLET ORAL 2 TIMES DAILY
Qty: 180 TABLET | Refills: 1 | OUTPATIENT
Start: 2022-11-21

## 2023-02-04 SDOH — ECONOMIC STABILITY: FOOD INSECURITY: WITHIN THE PAST 12 MONTHS, THE FOOD YOU BOUGHT JUST DIDN'T LAST AND YOU DIDN'T HAVE MONEY TO GET MORE.: NEVER TRUE

## 2023-02-04 SDOH — ECONOMIC STABILITY: INCOME INSECURITY: HOW HARD IS IT FOR YOU TO PAY FOR THE VERY BASICS LIKE FOOD, HOUSING, MEDICAL CARE, AND HEATING?: NOT VERY HARD

## 2023-02-04 SDOH — ECONOMIC STABILITY: HOUSING INSECURITY
IN THE LAST 12 MONTHS, WAS THERE A TIME WHEN YOU DID NOT HAVE A STEADY PLACE TO SLEEP OR SLEPT IN A SHELTER (INCLUDING NOW)?: NO

## 2023-02-04 SDOH — ECONOMIC STABILITY: FOOD INSECURITY: WITHIN THE PAST 12 MONTHS, YOU WORRIED THAT YOUR FOOD WOULD RUN OUT BEFORE YOU GOT MONEY TO BUY MORE.: NEVER TRUE

## 2023-02-07 ENCOUNTER — OFFICE VISIT (OUTPATIENT)
Dept: INTERNAL MEDICINE CLINIC | Age: 88
End: 2023-02-07
Payer: MEDICARE

## 2023-02-07 VITALS
RESPIRATION RATE: 16 BRPM | TEMPERATURE: 97.1 F | BODY MASS INDEX: 19.67 KG/M2 | DIASTOLIC BLOOD PRESSURE: 70 MMHG | HEART RATE: 88 BPM | OXYGEN SATURATION: 97 % | WEIGHT: 114.6 LBS | SYSTOLIC BLOOD PRESSURE: 132 MMHG

## 2023-02-07 DIAGNOSIS — E11.9 DIABETES MELLITUS TYPE 2 IN NONOBESE (HCC): ICD-10-CM

## 2023-02-07 DIAGNOSIS — G20 PARKINSON DISEASE (HCC): ICD-10-CM

## 2023-02-07 DIAGNOSIS — J30.9 ALLERGIC RHINITIS, UNSPECIFIED SEASONALITY, UNSPECIFIED TRIGGER: ICD-10-CM

## 2023-02-07 DIAGNOSIS — H40.9 GLAUCOMA OF BOTH EYES, UNSPECIFIED GLAUCOMA TYPE: ICD-10-CM

## 2023-02-07 DIAGNOSIS — Z00.00 HEALTHCARE MAINTENANCE: ICD-10-CM

## 2023-02-07 DIAGNOSIS — I77.1 STENOSIS OF LEFT SUBCLAVIAN ARTERY (HCC): ICD-10-CM

## 2023-02-07 DIAGNOSIS — Z85.3 HISTORY OF MALIGNANT NEOPLASM OF LEFT BREAST: ICD-10-CM

## 2023-02-07 DIAGNOSIS — E78.2 MIXED HYPERLIPIDEMIA: Primary | ICD-10-CM

## 2023-02-07 PROCEDURE — 1036F TOBACCO NON-USER: CPT | Performed by: INTERNAL MEDICINE

## 2023-02-07 PROCEDURE — 1090F PRES/ABSN URINE INCON ASSESS: CPT | Performed by: INTERNAL MEDICINE

## 2023-02-07 PROCEDURE — 1123F ACP DISCUSS/DSCN MKR DOCD: CPT | Performed by: INTERNAL MEDICINE

## 2023-02-07 PROCEDURE — G8484 FLU IMMUNIZE NO ADMIN: HCPCS | Performed by: INTERNAL MEDICINE

## 2023-02-07 PROCEDURE — G8427 DOCREV CUR MEDS BY ELIG CLIN: HCPCS | Performed by: INTERNAL MEDICINE

## 2023-02-07 PROCEDURE — G8420 CALC BMI NORM PARAMETERS: HCPCS | Performed by: INTERNAL MEDICINE

## 2023-02-07 PROCEDURE — 99214 OFFICE O/P EST MOD 30 MIN: CPT | Performed by: INTERNAL MEDICINE

## 2023-02-07 ASSESSMENT — ENCOUNTER SYMPTOMS
GASTROINTESTINAL NEGATIVE: 1
RESPIRATORY NEGATIVE: 1
COUGH: 0
SHORTNESS OF BREATH: 0
BACK PAIN: 0
RHINORRHEA: 1
EYES NEGATIVE: 1
WHEEZING: 0

## 2023-02-07 ASSESSMENT — PATIENT HEALTH QUESTIONNAIRE - PHQ9
SUM OF ALL RESPONSES TO PHQ QUESTIONS 1-9: 0
1. LITTLE INTEREST OR PLEASURE IN DOING THINGS: 0
2. FEELING DOWN, DEPRESSED OR HOPELESS: 0
SUM OF ALL RESPONSES TO PHQ9 QUESTIONS 1 & 2: 0
SUM OF ALL RESPONSES TO PHQ QUESTIONS 1-9: 0

## 2023-02-07 NOTE — PROGRESS NOTES
Capo Melendez  Patient's  is 1932  Seen in office on 2023      SUBJECTIVE:  Elena Sunshine aby 80 y. o.year old female presents today   Chief Complaint   Patient presents with    Follow-up   Patient is here for follow-up of diabetes mellitus, hyperlipidemia, Parkinson's disease, coronary artery disease and allergic rhinitis  Patient has diabetes mellitus and he states the blood sugars are running good at home. No hypoglycemia. Patient has hyperlipidemia. Taking medications. No abdominal pain, no nausea or vomiting. No myalgias. Patient has Parkinson's disease and sees neurologist.  She decided not to take Sinemet and she did discuss with nurse practitioner neurology. Patient had tremors of the left hand and he stated it gets worse when she is under stress or anxiety. Patient states she is not anxious and does not want any medication for that and still does not want to take any medications for Parkinson's. Patient is ambulatory. Denies any falls. She is using cane to ambulate. Taking medications regularly. No side effects noted. Review of Systems   Constitutional: Negative. Negative for fatigue, fever and unexpected weight change. HENT:  Positive for rhinorrhea. Eyes: Negative. Respiratory: Negative. Negative for cough, shortness of breath and wheezing. Cardiovascular:  Negative for chest pain, palpitations and leg swelling. Gastrointestinal: Negative. Endocrine: Negative. Genitourinary: Negative. Musculoskeletal:  Positive for gait problem. Negative for back pain. Uses cane    Skin: Negative. Neurological:  Positive for tremors. Negative for dizziness, facial asymmetry, weakness, light-headedness, numbness and headaches. Hematological: Negative. Psychiatric/Behavioral: Negative. Negative for dysphoric mood. The patient is not nervous/anxious.       OBJECTIVE: /70   Pulse 88   Temp 97.1 °F (36.2 °C) (Temporal)   Resp 16   Wt 114 lb 9.6 oz (52 kg) SpO2 97%   BMI 19.67 kg/m²     Wt Readings from Last 3 Encounters:   02/07/23 114 lb 9.6 oz (52 kg)   11/07/22 118 lb (53.5 kg)   11/02/22 118 lb (53.5 kg)      GENERAL: - Alert, oriented, pleasant, in no apparent distress. HEENT: - Conjunctiva pink, no scleral icterus. ENT clear. NECK: -Supple. No jugular venous distention noted. No masses felt,  CARDIOVASCULAR: - Normal S1 and S2    PULMONARY: - No respiratory distress. No wheezes or rales. ABDOMEN: - Soft and non-tender,no masses  ororganomegaly. EXTREMITIES: - No cyanosis, clubbing, or significant edema. SKIN: Skin is warm and dry. NEUROLOGICAL: - Cranial nerves II through XII are grossly intact. Tremors of hands left more     IMPRESSION:    Encounter Diagnoses   Name Primary? Mixed hyperlipidemia Yes    Stenosis of left subclavian artery     Parkinson disease (HCC)     Diabetes mellitus type 2 in nonobese ([facility])     History of malignant neoplasm of left breast     Glaucoma of both eyes, unspecified glaucoma type     Allergic rhinitis, unspecified seasonality, unspecified trigger        ASSESSMENT/PLAN:    1. Mixed hyperlipidemia  Overview:  -pt is on atorvastatin  40 mg daily. Follow diet and exercise  2. Stenosis of left subclavian artery  Overview:  Pulse weaker on left radial  Carotid and subclavian bruit on left side. refused w/u  Discussed again still refused  3. Parkinson disease ([facility]  Overview:  She is seeing Dr Cosme Rogers, but doctor moved  Pt took sinemet but stopped it as it caused faigue  Referred to Dr Yohana Dai. Recommended to continue SInemet but is not taking  In f/u with NP as patient has stopped medication it was felt as patient was able to perform her ADL : to leave her with out any med  Pt states tremors get worse when she is stressed out  Pt denies any anxiety on daily basis. Does not want any med        4.  Diabetes mellitus type 2 in nonobese [facility])  Overview:  Since 2005  Maribel Ennis also  Continue metformin  mg bid  BS are good     5. History of malignant neoplasm of left breast  Overview:  Left breast, Radiation therapy, lumpectomy 1985  Does not see any surgeon in f/u  Gets mammo every year  Last mammogram 2/2019 :   Does not want test now. 6. Glaucoma of both eyes, unspecified glaucoma type  Overview:  Sees Dr Morris Aguirre  Uses eye drops. 7. Allergic rhinitis, unspecified seasonality, unspecified trigger  Overview:  Patient has allergic rhinitis and is taking Flonase. Refused vaccinations : shingles , flu vaccine, tetanus etc     Return to office in 3 months. Mediations reviewed with the patient. Continue current medications. Appropriate prescriptions are addressed. After visit summeryprovided. Follow up as directed sooner if needed. Questions answered and patient verbalizes understanding. Call for any problems, questions, or concerns. Allergies   Allergen Reactions    Eggs Or Egg-Derived Products [Eggs Or Egg-Derived Products] Hives    Iodine      Iodine contrast     Current Outpatient Medications   Medication Sig Dispense Refill    carvedilol (COREG) 6.25 MG tablet Take 1 tablet by mouth in the morning and 1 tablet before bedtime. 180 tablet 1    clopidogrel (PLAVIX) 75 MG tablet Take 1 tablet by mouth daily 30 tablet 5    metFORMIN (GLUCOPHAGE-XR) 500 MG extended release tablet Take 1 tablet by mouth 2 times daily (with meals) 180 tablet 1    nitroGLYCERIN (NITROSTAT) 0.4 MG SL tablet up to max of 3 total doses.  If no relief after 1 dose, call 911. 25 tablet 3    fluticasone (FLONASE) 50 MCG/ACT nasal spray SPRAY 1 SPRAY BY NASAL ROUTE EVERY DAY 1 each 3    atorvastatin (LIPITOR) 40 MG tablet Take 1 tablet by mouth nightly 30 tablet 5    blood glucose test strips (FREESTYLE LITE) strip PATIENT TESTING ONCE DAILY AND AS NEEDED, DX=E11.9 50 strip 3    dorzolamide-timolol (COSOPT) 22.3-6.8 MG/ML ophthalmic solution Place 1 drop into both eyes 2 times daily  3    aspirin 81 MG tablet Take 81 mg by mouth daily. No current facility-administered medications for this visit. Past Medical History:   Diagnosis Date    CAD (coronary artery disease)     suspected, refused w/u    Coronary artery disease suspected. refused cardio     Suspected. Refused to see cardio. Diabetes mellitus ([facility] since 4/08    [facility] ulcer ([facility] left     resolved    Glaucoma     Dr. Esperanza Vázquez    History of left heart catheterization 03/29/2019    EF > 55% 3 vessel CAD as described. In particular heavily calcified critical bifurcation of LAD/diagonal in addition to being small in size. Hx of Doppler echocardiogram 03/28/2019    EF 50% Hypokinetic lateral wall. Grade 3 diastolic dysfucunction. Mild concentric LV hypertrophy. Mild pulmonary htn,mild mr , mod tr. Hyperlipidemia     Left breast mass 03/22/2017    Pt seen Dr Floyd Greco for that. Mammo. Neg. She thinks it scar tissue.      Malignant neoplasm of left breast ([facility] 08/31/2015    Left breast, Radiation therapy, lumpectomy 1985    Parkinson disease ([facility] 2008    dr. Dawn Nieves following    Stenosis of left subclavian artery [facility])     refused w/u     Past Surgical History:   Procedure Laterality Date    APPENDECTOMY      BREAST LUMPECTOMY Left 1995    MARY AND BSO (CERVIX REMOVED) Right     TONSILLECTOMY       Social History     Tobacco Use    Smoking status: Never    Smokeless tobacco: Never   Substance Use Topics    Alcohol use: Never     Alcohol/week: 0.0 standard drinks       LAB REVIEW:  CBC:   Lab Results   Component Value Date/Time    WBC 6.6 11/07/2022 11:19 AM    HGB 12.9 11/07/2022 11:19 AM    HCT 39.1 11/07/2022 11:19 AM     11/07/2022 11:19 AM     Lipids:   Lab Results   Component Value Date    HDL 45 11/07/2022    LDLCALC 113 (H) 11/07/2022    LDLDIRECT 117 (H) 10/12/2018    TRIGLYCFAST 127 11/07/2022    CHOLFAST 183 11/07/2022     Renal:   Lab Results   Component Value Date/Time    BUN 16 11/07/2022 11:19 AM    CREATININE 0.8 11/07/2022 11:19 AM     11/07/2022 11:19 AM    K 4.7 11/07/2022 11:19 AM    ALT 10 11/07/2022 11:19 AM    AST 13 11/07/2022 11:19 AM    GLUCOSE 119 11/07/2022 11:19 AM    GLUF 169 03/12/2018 12:30 PM     PT/INR: No results found for: INR  A1C:   Lab Results   Component Value Date    LABA1C 6.2 11/07/2022 April MD Stephanie, 2/7/2023 , 10:34 AM

## 2023-03-09 PROBLEM — Z00.00 HEALTHCARE MAINTENANCE: Status: RESOLVED | Noted: 2023-02-07 | Resolved: 2023-03-09

## 2023-03-24 ENCOUNTER — TELEPHONE (OUTPATIENT)
Dept: INTERNAL MEDICINE CLINIC | Age: 88
End: 2023-03-24

## 2023-05-02 ENCOUNTER — OFFICE VISIT (OUTPATIENT)
Dept: CARDIOLOGY CLINIC | Age: 88
End: 2023-05-02
Payer: MEDICARE

## 2023-05-02 VITALS
SYSTOLIC BLOOD PRESSURE: 128 MMHG | BODY MASS INDEX: 20.32 KG/M2 | HEIGHT: 64 IN | WEIGHT: 119 LBS | DIASTOLIC BLOOD PRESSURE: 60 MMHG | HEART RATE: 88 BPM

## 2023-05-02 DIAGNOSIS — E78.2 MIXED HYPERLIPIDEMIA: ICD-10-CM

## 2023-05-02 DIAGNOSIS — E11.9 DIABETES MELLITUS TYPE 2 IN NONOBESE (HCC): ICD-10-CM

## 2023-05-02 DIAGNOSIS — I77.1 STENOSIS OF LEFT SUBCLAVIAN ARTERY (HCC): ICD-10-CM

## 2023-05-02 DIAGNOSIS — I25.119 CORONARY ARTERY DISEASE INVOLVING NATIVE CORONARY ARTERY OF NATIVE HEART WITH ANGINA PECTORIS (HCC): Primary | ICD-10-CM

## 2023-05-02 PROCEDURE — 1123F ACP DISCUSS/DSCN MKR DOCD: CPT | Performed by: INTERNAL MEDICINE

## 2023-05-02 PROCEDURE — G8427 DOCREV CUR MEDS BY ELIG CLIN: HCPCS | Performed by: INTERNAL MEDICINE

## 2023-05-02 PROCEDURE — 1036F TOBACCO NON-USER: CPT | Performed by: INTERNAL MEDICINE

## 2023-05-02 PROCEDURE — 1090F PRES/ABSN URINE INCON ASSESS: CPT | Performed by: INTERNAL MEDICINE

## 2023-05-02 PROCEDURE — 99213 OFFICE O/P EST LOW 20 MIN: CPT | Performed by: INTERNAL MEDICINE

## 2023-05-02 PROCEDURE — G8420 CALC BMI NORM PARAMETERS: HCPCS | Performed by: INTERNAL MEDICINE

## 2023-05-02 NOTE — PATIENT INSTRUCTIONS
CORONARY ARTERY DISEASE:Yes. Patient had declined CABG. clinically stable. Patient is on optimal medical regimen ( see medication list above )  -  Patient is currently  asymptomatic from CAD. - changes in  treatment: no, on ASA, Plavix & Coreg           - Testing ordered:  no  [facility] classification: 1  CATH 3/2019:   1. 3 vessel CAD as described. In particular Heavily Calcified,   critical bifurcation disease of LAD/ Diagonal in addition to   being small in size. 2. Normal LV systolic function. LVEF is > 55 %. Patient tolerated the procedure well. No immediate complications. Recommendations   Urgent surgical CABG (required before discharge, patient stable   with medical management). HTN:well controlled on current medical regimen, see list above. - changes in  treatment:   no, on Coreg & Lipitor     CARDIOMYOPATHY: None known   CONGESTIVE HEART FAILURE: NO KNOWN HISTORY.     VHD: No significant VHD noted  ECHO 3/2019   Left ventricular function is normal, EF is estimated at 50%. Hypokinetic lateral wall. Grade III diastolic dysfunction. Mild concentric left ventricular hypertrophy. Right ventricular systolic pressure of 39 mm Hg suggestive of mild pulmonary   hypertension. Mild mitral regurgitation is present. Moderate tricuspid regurgitation is present. No evidence of any pericardial effusion. DYSLIPIDEMIA: Patient's profile is at / near [address] [facility] current medical regimen wellyes, on Lipitor                                                              See most recent Lab values in Labs section above. PAD:  Known Left Subclavian stenosis. Probable Left Carotid stenosis. continue medical management     Carotid Bruit: Patient still does not want any testing or surgery. Wants medical management as for her Carotid AD problem as well.           TESTS ORDERED:None this visit     PREVIOUSLY ORDERED

## 2023-05-02 NOTE — PROGRESS NOTES
OFFICE PROGRESS NOTES      Dai Starr is a 80 y.o. female who has    CHIEF COMPLAINT AS FOLLOWS:  CHEST PAIN: Patient denies any C/O chest pains at this time. SOB: No C/O SOB at this time. LEG EDEMA: No leg edema   PALPITATIONS: Denies any C/O Palpitations   DIZZINESS: No C/O Dizziness   SYNCOPE: None   OTHER/ ADDITIONAL COMPLAINTS:                                     HPI: Patient is here for F/U on her CAD, HTN & Dyslipidemia problems. CAD: Patient has known CAD. Patient had Refused W/U. 3/2019-Pt had refused CABG and any intervention, opted for med Rx. HTN: Patient has known essential HTN. Has been treated with guideline recommended medical / physical/ diet therapy as stated below. Dyslipidemia: Patient has known mixed dyslipidemia. Has been treated with guideline recommended medical / physical/ diet therapy as stated below. Current Outpatient Medications   Medication Sig Dispense Refill    carvedilol (COREG) 6.25 MG tablet Take 1 tablet by mouth in the morning and 1 tablet before bedtime. 180 tablet 1    clopidogrel (PLAVIX) 75 MG tablet Take 1 tablet by mouth daily 30 tablet 5    metFORMIN (GLUCOPHAGE-XR) 500 MG extended release tablet Take 1 tablet by mouth 2 times daily (with meals) 180 tablet 1    nitroGLYCERIN (NITROSTAT) 0.4 MG SL tablet up to max of 3 total doses. If no relief after 1 dose, call 911. 25 tablet 3    fluticasone (FLONASE) 50 MCG/ACT nasal spray SPRAY 1 SPRAY BY NASAL ROUTE EVERY DAY 1 each 3    atorvastatin (LIPITOR) 40 MG tablet Take 1 tablet by mouth nightly 30 tablet 5    blood glucose test strips (FREESTYLE LITE) strip PATIENT TESTING ONCE DAILY AND AS NEEDED, DX=E11.9 50 strip 3    dorzolamide-timolol (COSOPT) 22.3-6.8 MG/ML ophthalmic solution Place 1 drop into both eyes 2 times daily  3    aspirin 81 MG tablet Take 1 tablet by mouth daily       No current facility-administered medications for this visit.      Allergies: Eggs or egg-derived products

## 2023-05-08 ENCOUNTER — OFFICE VISIT (OUTPATIENT)
Dept: INTERNAL MEDICINE CLINIC | Age: 88
End: 2023-05-08
Payer: MEDICARE

## 2023-05-08 VITALS
BODY MASS INDEX: 20.87 KG/M2 | DIASTOLIC BLOOD PRESSURE: 64 MMHG | OXYGEN SATURATION: 98 % | WEIGHT: 121.6 LBS | TEMPERATURE: 97.7 F | RESPIRATION RATE: 12 BRPM | HEART RATE: 72 BPM | SYSTOLIC BLOOD PRESSURE: 116 MMHG

## 2023-05-08 DIAGNOSIS — J30.9 ALLERGIC RHINITIS, UNSPECIFIED SEASONALITY, UNSPECIFIED TRIGGER: ICD-10-CM

## 2023-05-08 DIAGNOSIS — Z85.3 HISTORY OF MALIGNANT NEOPLASM OF LEFT BREAST: ICD-10-CM

## 2023-05-08 DIAGNOSIS — I77.1 STENOSIS OF LEFT SUBCLAVIAN ARTERY (HCC): ICD-10-CM

## 2023-05-08 DIAGNOSIS — E11.9 DIABETES MELLITUS TYPE 2 IN NONOBESE (HCC): Primary | ICD-10-CM

## 2023-05-08 DIAGNOSIS — I25.119 CORONARY ARTERY DISEASE INVOLVING NATIVE CORONARY ARTERY OF NATIVE HEART WITH ANGINA PECTORIS (HCC): ICD-10-CM

## 2023-05-08 DIAGNOSIS — E78.2 MIXED HYPERLIPIDEMIA: ICD-10-CM

## 2023-05-08 DIAGNOSIS — G20 PARKINSON DISEASE (HCC): ICD-10-CM

## 2023-05-08 PROCEDURE — 1090F PRES/ABSN URINE INCON ASSESS: CPT | Performed by: INTERNAL MEDICINE

## 2023-05-08 PROCEDURE — G8420 CALC BMI NORM PARAMETERS: HCPCS | Performed by: INTERNAL MEDICINE

## 2023-05-08 PROCEDURE — 1123F ACP DISCUSS/DSCN MKR DOCD: CPT | Performed by: INTERNAL MEDICINE

## 2023-05-08 PROCEDURE — 36415 COLL VENOUS BLD VENIPUNCTURE: CPT | Performed by: INTERNAL MEDICINE

## 2023-05-08 PROCEDURE — 99214 OFFICE O/P EST MOD 30 MIN: CPT | Performed by: INTERNAL MEDICINE

## 2023-05-08 PROCEDURE — G8427 DOCREV CUR MEDS BY ELIG CLIN: HCPCS | Performed by: INTERNAL MEDICINE

## 2023-05-08 PROCEDURE — 1036F TOBACCO NON-USER: CPT | Performed by: INTERNAL MEDICINE

## 2023-05-08 ASSESSMENT — PATIENT HEALTH QUESTIONNAIRE - PHQ9
SUM OF ALL RESPONSES TO PHQ QUESTIONS 1-9: 0
1. LITTLE INTEREST OR PLEASURE IN DOING THINGS: 0
SUM OF ALL RESPONSES TO PHQ QUESTIONS 1-9: 0
SUM OF ALL RESPONSES TO PHQ9 QUESTIONS 1 & 2: 0
2. FEELING DOWN, DEPRESSED OR HOPELESS: 0
SUM OF ALL RESPONSES TO PHQ QUESTIONS 1-9: 0
SUM OF ALL RESPONSES TO PHQ QUESTIONS 1-9: 0

## 2023-05-08 ASSESSMENT — ENCOUNTER SYMPTOMS
WHEEZING: 0
GASTROINTESTINAL NEGATIVE: 1
COUGH: 0
SHORTNESS OF BREATH: 0
RESPIRATORY NEGATIVE: 1
ALLERGIC/IMMUNOLOGIC NEGATIVE: 1
EYES NEGATIVE: 1

## 2023-05-08 NOTE — PROGRESS NOTES
Jeff Renae  Patient's  is 1932  Seen in office on 2023      SUBJECTIVE:  [place] 80 y. o.year old female presents today   Chief Complaint   Patient presents with    Follow-up    Other     Sees Dr Ze Gorman     Patient is here for follow-up of diabetes, hypertension, hyperlipidemia, allergies, coronary artery disease    Patient has DM. No hypoglycemia. No numbness or weakness. No dizziness. Blood sugars are good at home. Patient has hyperlipidemia. Taking medications. No abdominal pain, no nausea or vomiting. No myalgias. Patient has coronary artery disease. She is cardiologist Dr. Fernandez Gomez. Patient denies any chest pain. No shortness of breath, no congestive heart failure symptoms  Patient has allergic rhinitis and still uses Flonase. Patient has hyperlipidemia. Taking medications. No abdominal pain, no nausea or vomiting. No myalgias. History of Parkinson's disease still have tremors when she gets stressed out. .  She is not taking any medications for that. Taking medications regularly. No side effects noted. Review of Systems   Constitutional: Negative. HENT: Negative. Eyes: Negative. Respiratory: Negative. Negative for cough, shortness of breath and wheezing. Cardiovascular: Negative. Negative for chest pain and leg swelling. Gastrointestinal: Negative. Endocrine: Negative. Genitourinary: Negative. Musculoskeletal:  Positive for gait problem. Skin: Negative. Allergic/Immunologic: Negative. Hematological: Negative. Psychiatric/Behavioral: Negative. OBJECTIVE: /64   Pulse 72   Temp 97.7 °F (36.5 °C) (Temporal)   Resp 12   Wt 121 lb 9.6 oz (55.2 kg)   SpO2 98%   BMI 20.87 kg/m²     Wt Readings from Last 3 Encounters:   23 121 lb 9.6 oz (55.2 kg)   23 119 lb (54 kg)   23 114 lb 9.6 oz (52 kg)      GENERAL: - Alert, oriented, pleasant, in no apparent distress. HEENT: - Conjunctiva pink, no scleral icterus.  ENT

## 2023-05-09 LAB
ALBUMIN SERPL-MCNC: 4.2 G/DL (ref 3.4–5)
ALBUMIN/GLOB SERPL: 1.8 {RATIO} (ref 1.1–2.2)
ALP SERPL-CCNC: 75 U/L (ref 40–129)
ALT SERPL-CCNC: 16 U/L (ref 10–40)
ANION GAP SERPL CALCULATED.3IONS-SCNC: 8 MMOL/L (ref 3–16)
AST SERPL-CCNC: 17 U/L (ref 15–37)
BILIRUB SERPL-MCNC: 0.4 MG/DL (ref 0–1)
BUN SERPL-MCNC: 20 MG/DL (ref 7–20)
CALCIUM SERPL-MCNC: 9.5 MG/DL (ref 8.3–10.6)
CHLORIDE SERPL-SCNC: 107 MMOL/L (ref 99–110)
CHOLEST SERPL-MCNC: 167 MG/DL (ref 0–199)
CO2 SERPL-SCNC: 27 MMOL/L (ref 21–32)
CREAT SERPL-MCNC: 0.8 MG/DL (ref 0.6–1.2)
EST. AVERAGE GLUCOSE BLD GHB EST-MCNC: 151.3 MG/DL
GFR SERPLBLD CREATININE-BSD FMLA CKD-EPI: >60 ML/MIN/{1.73_M2}
GLUCOSE SERPL-MCNC: 140 MG/DL (ref 70–99)
HBA1C MFR BLD: 6.9 %
HDLC SERPL-MCNC: 51 MG/DL (ref 40–60)
LDL CHOLESTEROL CALCULATED: 88 MG/DL
POTASSIUM SERPL-SCNC: 4.9 MMOL/L (ref 3.5–5.1)
PROT SERPL-MCNC: 6.5 G/DL (ref 6.4–8.2)
SODIUM SERPL-SCNC: 142 MMOL/L (ref 136–145)
TRIGL SERPL-MCNC: 139 MG/DL (ref 0–150)
VLDLC SERPL CALC-MCNC: 28 MG/DL

## 2023-08-09 ENCOUNTER — OFFICE VISIT (OUTPATIENT)
Dept: INTERNAL MEDICINE CLINIC | Age: 88
End: 2023-08-09
Payer: MEDICARE

## 2023-08-09 VITALS
RESPIRATION RATE: 16 BRPM | DIASTOLIC BLOOD PRESSURE: 68 MMHG | OXYGEN SATURATION: 96 % | BODY MASS INDEX: 20.32 KG/M2 | SYSTOLIC BLOOD PRESSURE: 132 MMHG | TEMPERATURE: 97.2 F | HEART RATE: 72 BPM | WEIGHT: 118.4 LBS

## 2023-08-09 DIAGNOSIS — I77.1 STENOSIS OF LEFT SUBCLAVIAN ARTERY (HCC): ICD-10-CM

## 2023-08-09 DIAGNOSIS — G20 PARKINSON DISEASE (HCC): ICD-10-CM

## 2023-08-09 DIAGNOSIS — I25.119 CORONARY ARTERY DISEASE INVOLVING NATIVE CORONARY ARTERY OF NATIVE HEART WITH ANGINA PECTORIS (HCC): ICD-10-CM

## 2023-08-09 DIAGNOSIS — J30.9 ALLERGIC RHINITIS, UNSPECIFIED SEASONALITY, UNSPECIFIED TRIGGER: ICD-10-CM

## 2023-08-09 DIAGNOSIS — Z85.3 HISTORY OF MALIGNANT NEOPLASM OF LEFT BREAST: ICD-10-CM

## 2023-08-09 DIAGNOSIS — H40.9 GLAUCOMA OF BOTH EYES, UNSPECIFIED GLAUCOMA TYPE: ICD-10-CM

## 2023-08-09 DIAGNOSIS — E78.2 MIXED HYPERLIPIDEMIA: Primary | ICD-10-CM

## 2023-08-09 DIAGNOSIS — E11.9 DIABETES MELLITUS TYPE 2 IN NONOBESE (HCC): ICD-10-CM

## 2023-08-09 PROCEDURE — 1090F PRES/ABSN URINE INCON ASSESS: CPT | Performed by: INTERNAL MEDICINE

## 2023-08-09 PROCEDURE — 1123F ACP DISCUSS/DSCN MKR DOCD: CPT | Performed by: INTERNAL MEDICINE

## 2023-08-09 PROCEDURE — 1036F TOBACCO NON-USER: CPT | Performed by: INTERNAL MEDICINE

## 2023-08-09 PROCEDURE — 3044F HG A1C LEVEL LT 7.0%: CPT | Performed by: INTERNAL MEDICINE

## 2023-08-09 PROCEDURE — G8420 CALC BMI NORM PARAMETERS: HCPCS | Performed by: INTERNAL MEDICINE

## 2023-08-09 PROCEDURE — G8427 DOCREV CUR MEDS BY ELIG CLIN: HCPCS | Performed by: INTERNAL MEDICINE

## 2023-08-09 PROCEDURE — 99214 OFFICE O/P EST MOD 30 MIN: CPT | Performed by: INTERNAL MEDICINE

## 2023-08-09 RX ORDER — FLUTICASONE PROPIONATE 50 MCG
1 SPRAY, SUSPENSION (ML) NASAL DAILY
Qty: 1 EACH | Refills: 3 | Status: SHIPPED | OUTPATIENT
Start: 2023-08-09

## 2023-08-09 NOTE — PROGRESS NOTES
distention noted. No masses felt,  CARDIOVASCULAR: - Normal S1 and S2    PULMONARY: - No respiratory distress. No wheezes or rales. ABDOMEN: - Soft and non-tender,no masses  ororganomegaly. EXTREMITIES: - No cyanosis, clubbing, or significant edema. SKIN: Skin is warm and dry. NEUROLOGICAL: - Cranial nerves II through XII are grossly intact. Tremors of hands left more than right . That changes with time per patient. IMPRESSION:    Encounter Diagnoses   Name Primary? Mixed hyperlipidemia Yes    Diabetes mellitus type 2 in nonobese (HCC)     Glaucoma of both eyes, unspecified glaucoma type     History of malignant neoplasm of left breast     Parkinson disease ([address])     Stenosis of left subclavian artery     Allergic rhinitis, unspecified seasonality, unspecified trigger     Coronary artery disease involving native coronary artery of native heart with angina pectoris [facility])        ASSESSMENT/PLAN:    ENT exam normal accept cerumen right ear but pt has fullness in the left ear  For drainage and fullness : use claritin and flonase     1. Mixed hyperlipidemia  Overview:  -pt is on atorvastatin  40 mg daily. Follow diet and exercise  Lipid profile is good. Hyperlipidemia is stable. Follow low cholesterol diet. Continue current treatment. 2. Diabetes mellitus type 2 in nonobese [facility])  Overview:  Since 2005  Francesco Patee Dr. Charmayne Pon also for eye exam  Continue metformin  mg bid  Assessment & Plan:  Hemoglobin A1c 6.9. Continue metformin  mg twice a day. Continue to see eye doctors. 3. Glaucoma of both eyes, unspecified glaucoma type  Overview:  Sees Dr Kvng Franklin  Uses eye drops. Assessment & Plan:   . Patient has glaucoma in both eyes and uses eyedrops    4. History of malignant neoplasm of left breast  Overview:  Left breast, Radiation therapy, lumpectomy 1985  Does not see any surgeon in f/u  Gets mammo every year  Last mammogram 2/2019 :   Does not want test now.

## 2023-08-09 NOTE — ASSESSMENT & PLAN NOTE
Hemoglobin A1c 6.9. Continue metformin  mg twice a day. Continue to see eye doctors. Rest.  Push fluids.  Zofran as needed for nausea and vomiting.  BRAT diet for diarrhea.  Naproxen & Tylenol pain.  Follow-up with primary care.  Follow-up with OB/GYN.  Return with any new or worsening symptoms.

## 2023-09-12 RX ORDER — CARVEDILOL 6.25 MG/1
6.25 TABLET ORAL 2 TIMES DAILY
Qty: 180 TABLET | Refills: 1 | Status: SHIPPED | OUTPATIENT
Start: 2023-09-12

## 2023-10-02 RX ORDER — FLUTICASONE PROPIONATE 50 MCG
SPRAY, SUSPENSION (ML) NASAL
Qty: 1 EACH | Refills: 3 | Status: SHIPPED | OUTPATIENT
Start: 2023-10-02

## 2023-10-02 NOTE — TELEPHONE ENCOUNTER
Medication:   Requested Prescriptions     Pending Prescriptions Disp Refills    fluticasone (FLONASE) 50 MCG/ACT nasal spray [Pharmacy Med Name: FLUTICASONE PROP 50 MCG SPRAY]  3     Sig: SPRAY 1 SPRAY BY NASAL ROUTE EVERY DAY        Last Filled:  08/09/2023    Patient Phone Number: [phone] (home)     Last appt: 8/9/2023   Next appt: 11/2/2023    Last OARRS:        No data to display

## 2023-11-02 ENCOUNTER — NURSE ONLY (OUTPATIENT)
Dept: INTERNAL MEDICINE CLINIC | Age: 88
End: 2023-11-02
Payer: MEDICARE

## 2023-11-02 DIAGNOSIS — E78.2 MIXED HYPERLIPIDEMIA: ICD-10-CM

## 2023-11-02 DIAGNOSIS — E11.9 DIABETES MELLITUS TYPE 2 IN NONOBESE (HCC): ICD-10-CM

## 2023-11-02 LAB
ALBUMIN SERPL-MCNC: 4.3 G/DL (ref 3.4–5)
ALBUMIN/GLOB SERPL: 1.8 {RATIO} (ref 1.1–2.2)
ALP SERPL-CCNC: 80 U/L (ref 40–129)
ALT SERPL-CCNC: 11 U/L (ref 10–40)
ANION GAP SERPL CALCULATED.3IONS-SCNC: 9 MMOL/L (ref 3–16)
AST SERPL-CCNC: 13 U/L (ref 15–37)
BILIRUB SERPL-MCNC: 0.5 MG/DL (ref 0–1)
BUN SERPL-MCNC: 17 MG/DL (ref 7–20)
CALCIUM SERPL-MCNC: 9.5 MG/DL (ref 8.3–10.6)
CHLORIDE SERPL-SCNC: 105 MMOL/L (ref 99–110)
CHOLEST SERPL-MCNC: 157 MG/DL (ref 0–199)
CO2 SERPL-SCNC: 28 MMOL/L (ref 21–32)
CREAT SERPL-MCNC: 0.8 MG/DL (ref 0.6–1.2)
GFR SERPLBLD CREATININE-BSD FMLA CKD-EPI: >60 ML/MIN/{1.73_M2}
GLUCOSE SERPL-MCNC: 155 MG/DL (ref 70–99)
HDLC SERPL-MCNC: 46 MG/DL (ref 40–60)
LDL CHOLESTEROL CALCULATED: 87 MG/DL
POTASSIUM SERPL-SCNC: 4.8 MMOL/L (ref 3.5–5.1)
PROT SERPL-MCNC: 6.7 G/DL (ref 6.4–8.2)
SODIUM SERPL-SCNC: 142 MMOL/L (ref 136–145)
TRIGL SERPL-MCNC: 120 MG/DL (ref 0–150)
VLDLC SERPL CALC-MCNC: 24 MG/DL

## 2023-11-02 PROCEDURE — 36415 COLL VENOUS BLD VENIPUNCTURE: CPT | Performed by: INTERNAL MEDICINE

## 2023-11-03 LAB
EST. AVERAGE GLUCOSE BLD GHB EST-MCNC: 151.3 MG/DL
HBA1C MFR BLD: 6.9 %

## 2023-11-09 ENCOUNTER — OFFICE VISIT (OUTPATIENT)
Dept: INTERNAL MEDICINE CLINIC | Age: 88
End: 2023-11-09
Payer: MEDICARE

## 2023-11-09 VITALS
SYSTOLIC BLOOD PRESSURE: 138 MMHG | BODY MASS INDEX: 20.87 KG/M2 | TEMPERATURE: 96.9 F | DIASTOLIC BLOOD PRESSURE: 80 MMHG | HEART RATE: 76 BPM | WEIGHT: 121.6 LBS | OXYGEN SATURATION: 98 % | RESPIRATION RATE: 16 BRPM

## 2023-11-09 DIAGNOSIS — I25.119 CORONARY ARTERY DISEASE INVOLVING NATIVE CORONARY ARTERY OF NATIVE HEART WITH ANGINA PECTORIS (HCC): ICD-10-CM

## 2023-11-09 DIAGNOSIS — E78.2 MIXED HYPERLIPIDEMIA: ICD-10-CM

## 2023-11-09 DIAGNOSIS — H40.9 GLAUCOMA OF BOTH EYES, UNSPECIFIED GLAUCOMA TYPE: ICD-10-CM

## 2023-11-09 DIAGNOSIS — I77.1 STENOSIS OF LEFT SUBCLAVIAN ARTERY (HCC): ICD-10-CM

## 2023-11-09 DIAGNOSIS — G20.A1 PARKINSON'S DISEASE, UNSPECIFIED WHETHER DYSKINESIA PRESENT, UNSPECIFIED WHETHER MANIFESTATIONS FLUCTUATE: ICD-10-CM

## 2023-11-09 DIAGNOSIS — J30.9 ALLERGIC RHINITIS, UNSPECIFIED SEASONALITY, UNSPECIFIED TRIGGER: ICD-10-CM

## 2023-11-09 DIAGNOSIS — E11.9 DIABETES MELLITUS TYPE 2 IN NONOBESE (HCC): Primary | ICD-10-CM

## 2023-11-09 PROCEDURE — 3044F HG A1C LEVEL LT 7.0%: CPT | Performed by: INTERNAL MEDICINE

## 2023-11-09 PROCEDURE — 1123F ACP DISCUSS/DSCN MKR DOCD: CPT | Performed by: INTERNAL MEDICINE

## 2023-11-09 PROCEDURE — 99214 OFFICE O/P EST MOD 30 MIN: CPT | Performed by: INTERNAL MEDICINE

## 2023-11-09 PROCEDURE — G8428 CUR MEDS NOT DOCUMENT: HCPCS | Performed by: INTERNAL MEDICINE

## 2023-11-09 PROCEDURE — G8484 FLU IMMUNIZE NO ADMIN: HCPCS | Performed by: INTERNAL MEDICINE

## 2023-11-09 PROCEDURE — G8420 CALC BMI NORM PARAMETERS: HCPCS | Performed by: INTERNAL MEDICINE

## 2023-11-09 PROCEDURE — 1036F TOBACCO NON-USER: CPT | Performed by: INTERNAL MEDICINE

## 2023-11-09 PROCEDURE — 1090F PRES/ABSN URINE INCON ASSESS: CPT | Performed by: INTERNAL MEDICINE

## 2023-11-09 ASSESSMENT — PATIENT HEALTH QUESTIONNAIRE - PHQ9
1. LITTLE INTEREST OR PLEASURE IN DOING THINGS: 0
SUM OF ALL RESPONSES TO PHQ QUESTIONS 1-9: 0
2. FEELING DOWN, DEPRESSED OR HOPELESS: 0
SUM OF ALL RESPONSES TO PHQ QUESTIONS 1-9: 0
SUM OF ALL RESPONSES TO PHQ9 QUESTIONS 1 & 2: 0

## 2023-11-09 ASSESSMENT — ENCOUNTER SYMPTOMS
EYES NEGATIVE: 1
ALLERGIC/IMMUNOLOGIC NEGATIVE: 1
RESPIRATORY NEGATIVE: 1

## 2024-01-09 ENCOUNTER — TELEMEDICINE (OUTPATIENT)
Dept: INTERNAL MEDICINE CLINIC | Age: 89
End: 2024-01-09
Payer: MEDICARE

## 2024-01-09 DIAGNOSIS — Z00.00 MEDICARE ANNUAL WELLNESS VISIT, SUBSEQUENT: Primary | ICD-10-CM

## 2024-01-09 PROCEDURE — G0439 PPPS, SUBSEQ VISIT: HCPCS | Performed by: INTERNAL MEDICINE

## 2024-01-09 PROCEDURE — G8484 FLU IMMUNIZE NO ADMIN: HCPCS | Performed by: INTERNAL MEDICINE

## 2024-01-09 PROCEDURE — 1123F ACP DISCUSS/DSCN MKR DOCD: CPT | Performed by: INTERNAL MEDICINE

## 2024-01-09 ASSESSMENT — LIFESTYLE VARIABLES
HOW MANY STANDARD DRINKS CONTAINING ALCOHOL DO YOU HAVE ON A TYPICAL DAY: PATIENT DOES NOT DRINK
HOW OFTEN DO YOU HAVE A DRINK CONTAINING ALCOHOL: NEVER

## 2024-01-09 NOTE — PATIENT INSTRUCTIONS
irregular heartbeat.   After you call 911, the  may tell you to chew 1 adult-strength or 2 to 4 low-dose aspirin. Wait for an ambulance. Do not try to drive yourself.  Watch closely for changes in your health, and be sure to contact your doctor if you have any problems.  Where can you learn more?  Go to https://www.[employer].net/patientEd and enter F075 to learn more about \"A Healthy Heart: Care Instructions.\"  Current as of: June 25, 2023               Content Version: 13.9  © 9012-3496 [employer].   Care instructions adapted under license by [employer]. If you have questions about a medical condition or this instruction, always ask your healthcare professional. [employer] disclaims any warranty or liability for your use of this information.      Personalized Preventive Plan for July Yu - 1/9/2024  Medicare offers a range of preventive health benefits. Some of the tests and screenings are paid in full while other may be subject to a deductible, co-insurance, and/or copay.    Some of these benefits include a comprehensive review of your medical history including lifestyle, illnesses that may run in your family, and various assessments and screenings as appropriate.    After reviewing your medical record and screening and assessments performed today your provider may have ordered immunizations, labs, imaging, and/or referrals for you.  A list of these orders (if applicable) as well as your Preventive Care list are included within your After Visit Summary for your review.    Other Preventive Recommendations:    A preventive eye exam performed by an eye specialist is recommended every 1-2 years to screen for glaucoma; cataracts, macular degeneration, and other eye disorders.  A preventive dental visit is recommended every 6 months.  Try to get at least 150 minutes of exercise per week or 10,000 steps per day on a pedometer .  Order or download the FREE \"Exercise & Physical

## 2024-01-09 NOTE — PROGRESS NOTES
Medicare Annual Wellness Visit    July Yu is here for Medicare AWV    Assessment & Plan   Medicare annual wellness visit, subsequent  Recommendations for Preventive Services Due: see orders and patient instructions/AVS.  Recommended screening schedule for the next 5-10 years is provided to the patient in written form: see Patient Instructions/AVS.     Return in 1 year (on 1/9/2025).     Subjective       Patient's complete Health Risk Assessment and screening values have been reviewed and are found in Flowsheets. The following problems were reviewed today and where indicated follow up appointments were made and/or referrals ordered.    Positive Risk Factor Screenings with Interventions:                     ADL's:   Patient reports needing help with:     Interventions:  Patient comments: Pt mainly just needs help with transportation.  She states she can drive but does not like to unless she has to.                   Objective      Patient-Reported Vitals  No data recorded     Patient unble to obtained vitals d/t not having proper equipment available          Allergies   Allergen Reactions    Eggs Or Egg-Derived Products [Eggs Or Egg-Derived Products] Hives    Iodine      Iodine contrast     Prior to Visit Medications    Medication Sig Taking? Authorizing Provider   fluticasone (FLONASE) 50 MCG/ACT nasal spray SPRAY 1 SPRAY BY NASAL ROUTE EVERY DAY  Andrae Amaro MD   carvedilol (COREG) 6.25 MG tablet TAKE 1 TABLET BY MOUTH IN THE MORNING AND 1 TABLET BEFORE BEDTIME.  Andrae Amaro MD   metFORMIN (GLUCOPHAGE-XR) 500 MG extended release tablet Take 1 tablet by mouth 2 times daily (with meals)  Andrae Amaro MD   nitroGLYCERIN (NITROSTAT) 0.4 MG SL tablet up to max of 3 total doses. If no relief after 1 dose, call 911.  Gurpreet Perry MD   atorvastatin (LIPITOR) 40 MG tablet Take 1 tablet by mouth nightly  Gurpreet Perry MD   blood glucose test strips (FREESTYLE LITE) strip PATIENT TESTING ONCE DAILY AND AS

## 2024-02-14 ASSESSMENT — PATIENT HEALTH QUESTIONNAIRE - PHQ9
SUM OF ALL RESPONSES TO PHQ QUESTIONS 1-9: 0
SUM OF ALL RESPONSES TO PHQ9 QUESTIONS 1 & 2: 0
1. LITTLE INTEREST OR PLEASURE IN DOING THINGS: 0
2. FEELING DOWN, DEPRESSED OR HOPELESS: 0
1. LITTLE INTEREST OR PLEASURE IN DOING THINGS: NOT AT ALL
2. FEELING DOWN, DEPRESSED OR HOPELESS: NOT AT ALL
SUM OF ALL RESPONSES TO PHQ9 QUESTIONS 1 & 2: 0

## 2024-02-20 ENCOUNTER — OFFICE VISIT (OUTPATIENT)
Dept: INTERNAL MEDICINE CLINIC | Age: 89
End: 2024-02-20
Payer: MEDICARE

## 2024-02-20 VITALS
TEMPERATURE: 97.1 F | WEIGHT: 120.2 LBS | HEART RATE: 84 BPM | DIASTOLIC BLOOD PRESSURE: 76 MMHG | OXYGEN SATURATION: 98 % | BODY MASS INDEX: 20.63 KG/M2 | RESPIRATION RATE: 16 BRPM | SYSTOLIC BLOOD PRESSURE: 128 MMHG

## 2024-02-20 DIAGNOSIS — Z85.3 HISTORY OF MALIGNANT NEOPLASM OF LEFT BREAST: ICD-10-CM

## 2024-02-20 DIAGNOSIS — H40.9 GLAUCOMA OF BOTH EYES, UNSPECIFIED GLAUCOMA TYPE: ICD-10-CM

## 2024-02-20 DIAGNOSIS — E78.2 MIXED HYPERLIPIDEMIA: ICD-10-CM

## 2024-02-20 DIAGNOSIS — G20.A1 PARKINSON'S DISEASE, UNSPECIFIED WHETHER DYSKINESIA PRESENT, UNSPECIFIED WHETHER MANIFESTATIONS FLUCTUATE: ICD-10-CM

## 2024-02-20 DIAGNOSIS — I25.119 CORONARY ARTERY DISEASE INVOLVING NATIVE CORONARY ARTERY OF NATIVE HEART WITH ANGINA PECTORIS (HCC): ICD-10-CM

## 2024-02-20 DIAGNOSIS — J30.9 ALLERGIC RHINITIS, UNSPECIFIED SEASONALITY, UNSPECIFIED TRIGGER: ICD-10-CM

## 2024-02-20 DIAGNOSIS — E11.9 DIABETES MELLITUS TYPE 2 IN NONOBESE (HCC): Primary | ICD-10-CM

## 2024-02-20 DIAGNOSIS — I77.1 STENOSIS OF LEFT SUBCLAVIAN ARTERY (HCC): ICD-10-CM

## 2024-02-20 LAB — HBA1C MFR BLD: 7.7 %

## 2024-02-20 PROCEDURE — 99214 OFFICE O/P EST MOD 30 MIN: CPT | Performed by: INTERNAL MEDICINE

## 2024-02-20 PROCEDURE — G8420 CALC BMI NORM PARAMETERS: HCPCS | Performed by: INTERNAL MEDICINE

## 2024-02-20 PROCEDURE — 1123F ACP DISCUSS/DSCN MKR DOCD: CPT | Performed by: INTERNAL MEDICINE

## 2024-02-20 PROCEDURE — 83036 HEMOGLOBIN GLYCOSYLATED A1C: CPT | Performed by: INTERNAL MEDICINE

## 2024-02-20 PROCEDURE — G8427 DOCREV CUR MEDS BY ELIG CLIN: HCPCS | Performed by: INTERNAL MEDICINE

## 2024-02-20 PROCEDURE — 1090F PRES/ABSN URINE INCON ASSESS: CPT | Performed by: INTERNAL MEDICINE

## 2024-02-20 PROCEDURE — G8484 FLU IMMUNIZE NO ADMIN: HCPCS | Performed by: INTERNAL MEDICINE

## 2024-02-20 PROCEDURE — 3051F HG A1C>EQUAL 7.0%<8.0%: CPT | Performed by: INTERNAL MEDICINE

## 2024-02-20 PROCEDURE — 1036F TOBACCO NON-USER: CPT | Performed by: INTERNAL MEDICINE

## 2024-02-20 NOTE — PROGRESS NOTES
Recommended to continue SInemet but patient is not taking  In f/u with NP as patient had stopped medication it was felt as patient was able to perform her ADL : to leave her with out any med  Pt states tremors get worse when she is stressed out  3. Stenosis of left subclavian artery  Overview:  Pulse weaker on left radial  Carotid and subclavian bruit on left side. refused w/u  Discussed again still refused  4. Mixed hyperlipidemia  Overview:  -pt is on atorvastatin  40 mg daily.  Follow diet and exercise  5. History of malignant neoplasm of left breast  Overview:  Left breast, Radiation therapy, lumpectomy 1985  Does not see any surgeon in f/u  Gets mammo every year  Last mammogram 2/2019 :   Does not want test now.   6. Glaucoma of both eyes, unspecified glaucoma type  Overview:  Sees Dr Shaw  Uses eye drops.   Refer to DR Escobar  7. Coronary artery disease involving native coronary artery of native heart with angina pectoris (HCC)  Overview:  NSTEMI 3/27/19. Cath : triple vessel disease : refused CABG or stent.  On medical treatment.  On Plavix and statins, carvedilol  Sees Cardiologist Dr Gurpreet Perry.   Will d/c lisinopril as pt has cough   8. Allergic rhinitis, unspecified seasonality, unspecified trigger  Overview:  Patient has allergic rhinitis and is taking Flonase only prn         Medications were reviewed with the patient. Continue current medications.  Appropriate prescriptions were addressed. After visit jose jy provided.  Follow up as directed : sooner if needed.  Questions were answered and patient verbalized understanding. Call for any problems, questions, or concerns.       Allergies   Allergen Reactions    Eggs Or Egg-Derived Products [Eggs Or Egg-Derived Products] Hives    Iodine      Iodine contrast     Current Outpatient Medications   Medication Sig Dispense Refill    fluticasone (FLONASE) 50 MCG/ACT nasal spray SPRAY 1 SPRAY BY NASAL ROUTE EVERY DAY 1 each 3    carvedilol (COREG) 6.25 MG tablet

## 2024-02-21 ENCOUNTER — TELEPHONE (OUTPATIENT)
Dept: INTERNAL MEDICINE CLINIC | Age: 89
End: 2024-02-21

## 2024-05-10 ENCOUNTER — NURSE ONLY (OUTPATIENT)
Age: 89
End: 2024-05-10
Payer: MEDICARE

## 2024-05-10 DIAGNOSIS — E11.9 DIABETES MELLITUS TYPE 2 IN NONOBESE (HCC): ICD-10-CM

## 2024-05-10 DIAGNOSIS — E78.2 MIXED HYPERLIPIDEMIA: ICD-10-CM

## 2024-05-10 LAB
ALBUMIN SERPL-MCNC: 4.4 G/DL (ref 3.4–5)
ALBUMIN/GLOB SERPL: 1.6 {RATIO} (ref 1.1–2.2)
ALP SERPL-CCNC: 87 U/L (ref 40–129)
ALT SERPL-CCNC: 11 U/L (ref 10–40)
ANION GAP SERPL CALCULATED.3IONS-SCNC: 8 MMOL/L (ref 3–16)
AST SERPL-CCNC: 15 U/L (ref 15–37)
BASOPHILS # BLD: 0.1 K/UL (ref 0–0.2)
BASOPHILS NFR BLD: 0.7 %
BILIRUB SERPL-MCNC: 0.5 MG/DL (ref 0–1)
BUN SERPL-MCNC: 21 MG/DL (ref 7–20)
CALCIUM SERPL-MCNC: 9.7 MG/DL (ref 8.3–10.6)
CHLORIDE SERPL-SCNC: 103 MMOL/L (ref 99–110)
CHOLEST SERPL-MCNC: 165 MG/DL (ref 0–199)
CO2 SERPL-SCNC: 29 MMOL/L (ref 21–32)
CREAT SERPL-MCNC: 0.9 MG/DL (ref 0.6–1.2)
DEPRECATED RDW RBC AUTO: 14 % (ref 12.4–15.4)
EOSINOPHIL # BLD: 0.4 K/UL (ref 0–0.6)
EOSINOPHIL NFR BLD: 5.1 %
GFR SERPLBLD CREATININE-BSD FMLA CKD-EPI: 60 ML/MIN/{1.73_M2}
GLUCOSE SERPL-MCNC: 131 MG/DL (ref 70–99)
HCT VFR BLD AUTO: 41.5 % (ref 36–48)
HDLC SERPL-MCNC: 48 MG/DL (ref 40–60)
HGB BLD-MCNC: 13.9 G/DL (ref 12–16)
LDL CHOLESTEROL: 91 MG/DL
LYMPHOCYTES # BLD: 1.7 K/UL (ref 1–5.1)
LYMPHOCYTES NFR BLD: 20.3 %
MCH RBC QN AUTO: 27.7 PG (ref 26–34)
MCHC RBC AUTO-ENTMCNC: 33.5 G/DL (ref 31–36)
MCV RBC AUTO: 82.7 FL (ref 80–100)
MONOCYTES # BLD: 0.7 K/UL (ref 0–1.3)
MONOCYTES NFR BLD: 8.7 %
NEUTROPHILS # BLD: 5.3 K/UL (ref 1.7–7.7)
NEUTROPHILS NFR BLD: 65.2 %
PLATELET # BLD AUTO: 305 K/UL (ref 135–450)
PMV BLD AUTO: 9.4 FL (ref 5–10.5)
POTASSIUM SERPL-SCNC: 4.5 MMOL/L (ref 3.5–5.1)
PROT SERPL-MCNC: 7.2 G/DL (ref 6.4–8.2)
RBC # BLD AUTO: 5.02 M/UL (ref 4–5.2)
SODIUM SERPL-SCNC: 140 MMOL/L (ref 136–145)
TRIGL SERPL-MCNC: 132 MG/DL (ref 0–150)
VLDLC SERPL CALC-MCNC: 26 MG/DL
WBC # BLD AUTO: 8.2 K/UL (ref 4–11)

## 2024-05-10 PROCEDURE — 36415 COLL VENOUS BLD VENIPUNCTURE: CPT | Performed by: INTERNAL MEDICINE

## 2024-05-20 ENCOUNTER — OFFICE VISIT (OUTPATIENT)
Age: 89
End: 2024-05-20
Payer: MEDICARE

## 2024-05-20 VITALS
HEART RATE: 80 BPM | OXYGEN SATURATION: 97 % | DIASTOLIC BLOOD PRESSURE: 60 MMHG | SYSTOLIC BLOOD PRESSURE: 136 MMHG | RESPIRATION RATE: 16 BRPM | TEMPERATURE: 97 F | HEIGHT: 63 IN | BODY MASS INDEX: 21.29 KG/M2

## 2024-05-20 DIAGNOSIS — H40.9 GLAUCOMA OF BOTH EYES, UNSPECIFIED GLAUCOMA TYPE: ICD-10-CM

## 2024-05-20 DIAGNOSIS — E11.9 DIABETES MELLITUS TYPE 2 IN NONOBESE (HCC): Primary | ICD-10-CM

## 2024-05-20 DIAGNOSIS — Z85.3 HISTORY OF MALIGNANT NEOPLASM OF LEFT BREAST: ICD-10-CM

## 2024-05-20 DIAGNOSIS — G20.A1 PARKINSON'S DISEASE, UNSPECIFIED WHETHER DYSKINESIA PRESENT, UNSPECIFIED WHETHER MANIFESTATIONS FLUCTUATE (HCC): ICD-10-CM

## 2024-05-20 DIAGNOSIS — I77.1 STENOSIS OF LEFT SUBCLAVIAN ARTERY (HCC): ICD-10-CM

## 2024-05-20 DIAGNOSIS — R10.32 LLQ ABDOMINAL PAIN: ICD-10-CM

## 2024-05-20 DIAGNOSIS — J30.9 ALLERGIC RHINITIS, UNSPECIFIED SEASONALITY, UNSPECIFIED TRIGGER: ICD-10-CM

## 2024-05-20 DIAGNOSIS — I25.119 CORONARY ARTERY DISEASE INVOLVING NATIVE CORONARY ARTERY OF NATIVE HEART WITH ANGINA PECTORIS (HCC): ICD-10-CM

## 2024-05-20 DIAGNOSIS — E78.2 MIXED HYPERLIPIDEMIA: ICD-10-CM

## 2024-05-20 LAB — HBA1C MFR BLD: 6.8 %

## 2024-05-20 PROCEDURE — 99214 OFFICE O/P EST MOD 30 MIN: CPT | Performed by: INTERNAL MEDICINE

## 2024-05-20 PROCEDURE — 1123F ACP DISCUSS/DSCN MKR DOCD: CPT | Performed by: INTERNAL MEDICINE

## 2024-05-20 PROCEDURE — 1090F PRES/ABSN URINE INCON ASSESS: CPT | Performed by: INTERNAL MEDICINE

## 2024-05-20 PROCEDURE — 83036 HEMOGLOBIN GLYCOSYLATED A1C: CPT | Performed by: INTERNAL MEDICINE

## 2024-05-20 PROCEDURE — 3044F HG A1C LEVEL LT 7.0%: CPT | Performed by: INTERNAL MEDICINE

## 2024-05-20 PROCEDURE — G8420 CALC BMI NORM PARAMETERS: HCPCS | Performed by: INTERNAL MEDICINE

## 2024-05-20 PROCEDURE — G8428 CUR MEDS NOT DOCUMENT: HCPCS | Performed by: INTERNAL MEDICINE

## 2024-05-20 PROCEDURE — 1036F TOBACCO NON-USER: CPT | Performed by: INTERNAL MEDICINE

## 2024-05-20 RX ORDER — ASPIRIN 81 MG/1
81 TABLET ORAL DAILY
COMMUNITY

## 2024-05-20 RX ORDER — CLOPIDOGREL BISULFATE 75 MG/1
75 TABLET ORAL DAILY
COMMUNITY

## 2024-05-20 NOTE — PROGRESS NOTES
venous distention noted. No masses felt,  CARDIOVASCULAR: - Normal S1 and S2    PULMONARY: - No respiratory distress.  No wheezes or rales.    ABDOMEN: - Soft and non-tender,no masses  ororganomegaly. No tenderness LLQ. BS present   EXTREMITIES: - No cyanosis, clubbing, or significant edema.  SKIN: Skin is warm and dry.   NEUROLOGICAL: - Cranial nerves II through XII are grossly intact.  Tremors of the hands     IMPRESSION:    Encounter Diagnoses   Name Primary?    Diabetes mellitus type 2 in nonobese ([facility]) Yes    Parkinson's disease, unspecified whether dyskinesia present, unspecified whether manifestations fluctuate ([facility])     Mixed hyperlipidemia     History of malignant neoplasm of left breast     Glaucoma of both eyes, unspecified glaucoma type     Stenosis of left subclavian artery     Coronary artery disease involving native coronary artery of native heart with angina pectoris ([facility])     Allergic rhinitis, unspecified seasonality, unspecified trigger        ASSESSMENT/PLAN:    Pain in the LLQ abdomen. Will get CT abdomen and pelvis. Never had colonoscopy. Had refused in the past .  CBC is normal    1. Diabetes mellitus type 2 in nonobese ([facility])  Overview:  Since 2005  Continue metformin  mg bid  Last hga1c is 7.7  Pt is seeing new ophthalmologist : Dr Escobar  Todays hga1c is 6.8    Orders:  -     POCT glycosylated hemoglobin (Hb A1C)  2. Parkinson's disease, unspecified whether dyskinesia present, unspecified whether manifestations fluctuate ([facility])  Overview:  She saw a neurologist Dr Stroud, but doctor moved  Pt took sinemet but stopped it as it caused faigue  Referred to Dr Jimenez. Recommended to continue SInemet but patient is not taking  In f/u with NP as patient had stopped medication it was felt as patient was able to perform her ADL : to leave her with out any med  Pt states tremors get worse when she is stressed out  3. Mixed hyperlipidemia  Overview:  -pt is on atorvastatin  40 mg daily.  Follow diet

## 2024-05-24 ENCOUNTER — HOSPITAL ENCOUNTER (OUTPATIENT)
Dept: CT IMAGING | Age: 89
Discharge: HOME | End: 2024-05-24
Payer: MEDICARE

## 2024-05-24 DIAGNOSIS — R10.32 LLQ ABDOMINAL PAIN: ICD-10-CM

## 2024-05-24 PROCEDURE — 74176 CT ABD & PELVIS W/O CONTRAST: CPT

## 2024-06-03 ENCOUNTER — OFFICE VISIT (OUTPATIENT)
Age: 89
End: 2024-06-03
Payer: MEDICARE

## 2024-06-03 VITALS
WEIGHT: 118 LBS | SYSTOLIC BLOOD PRESSURE: 136 MMHG | RESPIRATION RATE: 16 BRPM | OXYGEN SATURATION: 96 % | DIASTOLIC BLOOD PRESSURE: 68 MMHG | HEART RATE: 76 BPM | BODY MASS INDEX: 20.9 KG/M2 | TEMPERATURE: 97.1 F

## 2024-06-03 DIAGNOSIS — R10.32 LLQ ABDOMINAL PAIN: ICD-10-CM

## 2024-06-03 DIAGNOSIS — I72.8 SPLENIC ARTERY ANEURYSM (HCC): Primary | ICD-10-CM

## 2024-06-03 PROCEDURE — 1036F TOBACCO NON-USER: CPT | Performed by: INTERNAL MEDICINE

## 2024-06-03 PROCEDURE — 1090F PRES/ABSN URINE INCON ASSESS: CPT | Performed by: INTERNAL MEDICINE

## 2024-06-03 PROCEDURE — 99213 OFFICE O/P EST LOW 20 MIN: CPT | Performed by: INTERNAL MEDICINE

## 2024-06-03 PROCEDURE — G8420 CALC BMI NORM PARAMETERS: HCPCS | Performed by: INTERNAL MEDICINE

## 2024-06-03 PROCEDURE — G8427 DOCREV CUR MEDS BY ELIG CLIN: HCPCS | Performed by: INTERNAL MEDICINE

## 2024-06-03 PROCEDURE — 1123F ACP DISCUSS/DSCN MKR DOCD: CPT | Performed by: INTERNAL MEDICINE

## 2024-06-03 NOTE — PROGRESS NOTES
Dr. Waters    History of left heart catheterization 03/29/2019    EF > 55% 3 vessel CAD as described. In particular heavily calcified critical bifurcation of LAD/diagonal in addition to being small in size.    Hx of Doppler echocardiogram 03/28/2019    EF 50% Hypokinetic lateral wall. Grade 3 diastolic dysfucunction. Mild concentric LV hypertrophy. Mild pulmonary htn,mild mr , mod tr.    Hyperlipidemia     Left breast mass 03/22/2017    Pt seen Dr Kuhn for that. Mammo. Neg. She thinks it scar tissue.     Malignant neoplasm of left breast (HCC) 08/31/2015    Left breast, Radiation therapy, lumpectomy 1985    Parkinson disease (HCC) 2008    dr. Horner following    Stenosis of left subclavian artery (HCC)     refused w/u     Past Surgical History:   Procedure Laterality Date    APPENDECTOMY      BREAST LUMPECTOMY Left 1995    MARY AND BSO (CERVIX REMOVED) Right     TONSILLECTOMY       Social History     Tobacco Use    Smoking status: Never    Smokeless tobacco: Never   Substance Use Topics    Alcohol use: Never     Comment: Caffiene - 2 cups of coffee/day       LAB REVIEW:  CBC:   Lab Results   Component Value Date/Time    WBC 8.2 05/10/2024 08:14 AM    HGB 13.9 05/10/2024 08:14 AM    HCT 41.5 05/10/2024 08:14 AM     05/10/2024 08:14 AM     Lipids:   Lab Results   Component Value Date    HDL 48 05/10/2024    LDLDIRECT 117 (H) 10/12/2018    TRIGLYCFAST 132 05/10/2024    CHOLFAST 165 05/10/2024     Renal:   Lab Results   Component Value Date/Time    BUN 21 05/10/2024 08:14 AM    CREATININE 0.9 05/10/2024 08:14 AM     05/10/2024 08:14 AM    K 4.5 05/10/2024 08:14 AM    ALKPHOS 87 05/10/2024 08:14 AM    ALT 11 05/10/2024 08:14 AM    AST 15 05/10/2024 08:14 AM    GLUCOSE 131 05/10/2024 08:14 AM    GLUF 169 03/12/2018 12:30 PM     PT/INR: No results found for: \"INR\"  A1C:   Lab Results   Component Value Date    LABA1C 6.8 05/20/2024           Andrae Amaro MD, 6/3/2024 , 11:10 AM

## 2024-06-25 ENCOUNTER — OFFICE VISIT (OUTPATIENT)
Dept: CARDIOLOGY CLINIC | Age: 89
End: 2024-06-25
Payer: MEDICARE

## 2024-06-25 VITALS
OXYGEN SATURATION: 98 % | BODY MASS INDEX: 21.26 KG/M2 | HEART RATE: 81 BPM | WEIGHT: 120 LBS | HEIGHT: 63 IN | SYSTOLIC BLOOD PRESSURE: 168 MMHG | DIASTOLIC BLOOD PRESSURE: 108 MMHG

## 2024-06-25 DIAGNOSIS — I25.119 CORONARY ARTERY DISEASE INVOLVING NATIVE CORONARY ARTERY OF NATIVE HEART WITH ANGINA PECTORIS (HCC): Primary | ICD-10-CM

## 2024-06-25 DIAGNOSIS — E11.9 DIABETES MELLITUS TYPE 2 IN NONOBESE (HCC): ICD-10-CM

## 2024-06-25 DIAGNOSIS — E78.2 MIXED HYPERLIPIDEMIA: ICD-10-CM

## 2024-06-25 DIAGNOSIS — I77.1 STENOSIS OF LEFT SUBCLAVIAN ARTERY (HCC): ICD-10-CM

## 2024-06-25 PROCEDURE — 99214 OFFICE O/P EST MOD 30 MIN: CPT | Performed by: INTERNAL MEDICINE

## 2024-06-25 PROCEDURE — G8427 DOCREV CUR MEDS BY ELIG CLIN: HCPCS | Performed by: INTERNAL MEDICINE

## 2024-06-25 PROCEDURE — 93000 ELECTROCARDIOGRAM COMPLETE: CPT | Performed by: INTERNAL MEDICINE

## 2024-06-25 PROCEDURE — G8420 CALC BMI NORM PARAMETERS: HCPCS | Performed by: INTERNAL MEDICINE

## 2024-06-25 PROCEDURE — 1123F ACP DISCUSS/DSCN MKR DOCD: CPT | Performed by: INTERNAL MEDICINE

## 2024-06-25 PROCEDURE — 1036F TOBACCO NON-USER: CPT | Performed by: INTERNAL MEDICINE

## 2024-06-25 PROCEDURE — 1090F PRES/ABSN URINE INCON ASSESS: CPT | Performed by: INTERNAL MEDICINE

## 2024-06-25 PROCEDURE — 3044F HG A1C LEVEL LT 7.0%: CPT | Performed by: INTERNAL MEDICINE

## 2024-06-25 RX ORDER — NITROGLYCERIN 0.4 MG/1
TABLET SUBLINGUAL
Qty: 25 TABLET | Refills: 3 | Status: SHIPPED | OUTPATIENT
Start: 2024-06-25

## 2024-06-25 RX ORDER — CARVEDILOL 12.5 MG/1
12.5 TABLET ORAL 2 TIMES DAILY
Qty: 60 TABLET | Refills: 5 | Status: SHIPPED | OUTPATIENT
Start: 2024-06-25

## 2024-06-25 RX ORDER — LATANOPROST 50 UG/ML
1 SOLUTION/ DROPS OPHTHALMIC NIGHTLY
COMMUNITY
Start: 2024-06-06

## 2024-06-25 RX ORDER — LISINOPRIL 5 MG/1
5 TABLET ORAL DAILY
Qty: 30 TABLET | Refills: 5 | Status: SHIPPED | OUTPATIENT
Start: 2024-06-25

## 2024-06-25 NOTE — PATIENT INSTRUCTIONS
CORONARY ARTERY DISEASE:Yes. Patient had declined CABG.   clinically stable. Patient is on optimal medical regimen ( see medication list above )  -  Patient is currently  asymptomatic from CAD.            - changes in  treatment: no, on ASA, Plavix & Coreg           - Testing ordered:  no  [place] classification: 1  CATH 3/2019:   1. 3 vessel CAD as described. In particular Heavily Calcified,   critical bifurcation disease of LAD/ Diagonal in addition to   being small in size.   2. Normal LV systolic function. LVEF is > 55 %.    Patient tolerated the procedure well.   No immediate complications.    Recommendations   Urgent surgical CABG (required before discharge, patient stable   with medical management).    EKG: NSR 82/min, WNL     HTN: Not well controlled on current medical regimen, see list above.             - changes in  treatment:   no, on Coreg. Will increase the dose & add Lisinopril to her regimen.      CARDIOMYOPATHY: None known   CONGESTIVE HEART FAILURE: NO KNOWN HISTORY.     VHD: No significant VHD noted  ECHO 3/2019   Left ventricular function is normal, EF is estimated at 50%.   Hypokinetic lateral wall.   Grade III diastolic dysfunction.   Mild concentric left ventricular hypertrophy.   Right ventricular systolic pressure of 39 mm Hg suggestive of mild pulmonary   hypertension.   Mild mitral regurgitation is present.   Moderate tricuspid regurgitation is present.   No evidence of any pericardial effusion.     DYSLIPIDEMIA: Patient's profile is at / near [email or web address],                                                           Tolerating current medical regimen wellyes, on Lipitor                                                              See most recent Lab values in Labs section above.     PAD:  Known Left Subclavian stenosis. Probable Left Carotid stenosis.continue medical management     Carotid Bruit: Patient still does not want any testing or surgery. Wants medical management as for her Carotid

## 2024-06-25 NOTE — PROGRESS NOTES
OFFICE PROGRESS NOTES      July is a 89 y.o. female who has    CHIEF COMPLAINT AS FOLLOWS:  CHEST PAIN: Patient denies any C/O chest pains at this time.      SOB: No C/O SOB at this time.               LEG EDEMA: No leg edema   PALPITATIONS: Denies any C/O Palpitations   DIZZINESS: No C/O Dizziness   SYNCOPE: None   OTHER/ ADDITIONAL COMPLAINTS:                                     HPI: Patient is here for F/U on his CAD, HTN & Dyslipidemia problems.   CAD: Patient has known CAD. 3/2019-Pt had refused CABG and any intervention, opted for med Rx.   VHD: Patient has known mitral / aortic valve disease. Has had valve repair / surgery in the past.  HTN: Patient has known essential HTN. Has been treated with guideline recommended medical / physical/ diet therapy as stated below.  Dyslipidemia: Patient has known mixed dyslipidemia. Has been treated with guideline recommended medical / physical/ diet therapy as stated below.                Current Outpatient Medications   Medication Sig Dispense Refill    latanoprost (XALATAN) 0.005 % ophthalmic solution Place 1 drop into both eyes nightly      clopidogrel (PLAVIX) 75 MG tablet Take 1 tablet by mouth daily      aspirin 81 MG EC tablet Take 1 tablet by mouth daily      fluticasone (FLONASE) 50 MCG/ACT nasal spray SPRAY 1 SPRAY BY NASAL ROUTE EVERY DAY 1 each 3    carvedilol (COREG) 6.25 MG tablet TAKE 1 TABLET BY MOUTH IN THE MORNING AND 1 TABLET BEFORE BEDTIME. 180 tablet 1    metFORMIN (GLUCOPHAGE-XR) 500 MG extended release tablet Take 1 tablet by mouth 2 times daily (with meals) 180 tablet 1    nitroGLYCERIN (NITROSTAT) 0.4 MG SL tablet up to max of 3 total doses. If no relief after 1 dose, call 911. 25 tablet 3    atorvastatin (LIPITOR) 40 MG tablet Take 1 tablet by mouth nightly 30 tablet 5    blood glucose test strips (FREESTYLE LITE) strip PATIENT TESTING ONCE DAILY AND AS NEEDED, DX=E11.9 50 strip 3    dorzolamide-timolol (COSOPT) 22.3-6.8 MG/ML ophthalmic  [1] : 2 [0] : 0 [Dull/Aching] : dull/aching [de-identified] : R thumb is better  [FreeTextEntry1] : R thumb [de-identified] : brace

## 2024-06-30 ENCOUNTER — PATIENT MESSAGE (OUTPATIENT)
Dept: CARDIOLOGY CLINIC | Age: 89
End: 2024-06-30

## 2024-08-05 NOTE — PATIENT INSTRUCTIONS
CAD:Yes. Patient declined CABG.   clinically stable. Patient is on optimal medical regimen ( see medication list above )  -  Patient is currently  asymptomatic from CAD.          - changes in  treatment:   no           - Testing ordered:  no  [place] classification: 1  [place] RISK SCORE:  DAO RISK SCORE:  HTN:well controlled on current medical regimen, see list above.             - changes in  treatment:   no   CARDIOMYOPATHY: None known   CONGESTIVE HEART FAILURE: NO KNOWN HISTORY.     VHD: No significant VHD noted  DYSLIPIDEMIA: Patient's profile is at / near [email or web address] current medical regimen wellyes,                                                               See most recent Lab values in Labs section above. PAD:  Known Left Subclavian stenosis. OTHER RELEVANT DIAGNOSIS:as noted in patient's active problem list:  Carotid Bruit: Patient still does not want any testing or surgery. Wants medical management as for her Carotid AD problem as well. Left subclavian stenosis: continue medical management. TESTS ORDERED: None this visit                                            All previously ordered tests reviewed.   ARRHYTHMIAS: None known   MEDICATIONS: CPM.   Office f/u in six months.
Statement Selected

## 2024-09-03 ENCOUNTER — OFFICE VISIT (OUTPATIENT)
Age: 89
End: 2024-09-03

## 2024-09-03 VITALS
OXYGEN SATURATION: 96 % | BODY MASS INDEX: 20.8 KG/M2 | HEART RATE: 68 BPM | DIASTOLIC BLOOD PRESSURE: 62 MMHG | TEMPERATURE: 97.9 F | WEIGHT: 117.4 LBS | SYSTOLIC BLOOD PRESSURE: 122 MMHG | RESPIRATION RATE: 16 BRPM

## 2024-09-03 DIAGNOSIS — E78.2 MIXED HYPERLIPIDEMIA: Primary | ICD-10-CM

## 2024-09-03 DIAGNOSIS — G20.A1 PARKINSON'S DISEASE, UNSPECIFIED WHETHER DYSKINESIA PRESENT, UNSPECIFIED WHETHER MANIFESTATIONS FLUCTUATE (HCC): ICD-10-CM

## 2024-09-03 DIAGNOSIS — I25.119 CORONARY ARTERY DISEASE INVOLVING NATIVE CORONARY ARTERY OF NATIVE HEART WITH ANGINA PECTORIS (HCC): ICD-10-CM

## 2024-09-03 DIAGNOSIS — Z85.3 HISTORY OF MALIGNANT NEOPLASM OF LEFT BREAST: ICD-10-CM

## 2024-09-03 DIAGNOSIS — H40.9 GLAUCOMA OF BOTH EYES, UNSPECIFIED GLAUCOMA TYPE: ICD-10-CM

## 2024-09-03 DIAGNOSIS — I72.8 SPLENIC ARTERY ANEURYSM (HCC): ICD-10-CM

## 2024-09-03 DIAGNOSIS — J30.9 ALLERGIC RHINITIS, UNSPECIFIED SEASONALITY, UNSPECIFIED TRIGGER: ICD-10-CM

## 2024-09-03 DIAGNOSIS — E11.9 DIABETES MELLITUS TYPE 2 IN NONOBESE (HCC): ICD-10-CM

## 2024-09-03 NOTE — PROGRESS NOTES
July Yu  Patient's  is 1932  Seen in office on 9/3/2024      SUBJECTIVE:  July badillo 89 y.o.year old female presents today   Chief Complaint   Patient presents with    3 Month Follow-Up    Diabetes       Seen patient along with [facility] [place]  Patient is here for follow-up of diabetes, hypertension, hyperlipidemia, allergies, coronary artery disease    Pt seen Dr Perry. BP was high at his office  He started her on lisinopril. But pt did not take it  Increased carvedilol to 12.5 mg twice a day  BP is normal now.  Patient stated she was upset at that time when he saw cardiologist and he did not like the nurses behavior towards her.  Patient stated there were reviewed.  Her BP was elevated.  But now blood pressure is in the normal range and she did not take the lisinopril.        Patient has DM. No hypoglycemia. No numbness or weakness. No dizziness. Blood sugars are good at home.   Patient has hyperlipidemia. Taking medications. No abdominal pain, no nausea or vomiting. No myalgias.     Patient has coronary artery disease. Her cardiologist Dr. Perry.  Patient denies any chest pain.  No shortness of breath, no congestive heart failure symptoms     Patient has allergic rhinitis : she is not using flonase. She had increased drainage. No cough. Does not feel sick. Left ears feel full. No pain.      Patient has hyperlipidemia. Taking medications. No abdominal pain, no nausea or vomiting. No myalgias.     History of Parkinson's disease still have tremors when she gets stressed out..  She is not taking any medications for that. Does not want to see neurologist   Taking medications regularly. No side effects noted.    Review of Systems  Review of system normal except as in HPI  OBJECTIVE: /62   Pulse 68   Temp 97.9 °F (36.6 °C) (Oral)   Resp 16   Wt 53.3 kg (117 lb 6.4 oz)   SpO2 96%   BMI 20.80 kg/m²     Wt Readings from Last 3 Encounters:   24 53.3 kg (117 lb 6.4 oz)   24 54.4

## 2024-11-26 ENCOUNTER — LAB (OUTPATIENT)
Age: 88
End: 2024-11-26
Payer: MEDICARE

## 2024-11-26 DIAGNOSIS — E78.2 MIXED HYPERLIPIDEMIA: ICD-10-CM

## 2024-11-26 LAB
ALBUMIN SERPL-MCNC: 4.5 G/DL (ref 3.4–5)
ALBUMIN/GLOB SERPL: 1.8 {RATIO} (ref 1.1–2.2)
ALP SERPL-CCNC: 89 U/L (ref 40–129)
ALT SERPL-CCNC: 13 U/L (ref 10–40)
ANION GAP SERPL CALCULATED.3IONS-SCNC: 11 MMOL/L (ref 3–16)
AST SERPL-CCNC: 19 U/L (ref 15–37)
BILIRUB SERPL-MCNC: 0.6 MG/DL (ref 0–1)
BUN SERPL-MCNC: 16 MG/DL (ref 7–20)
CALCIUM SERPL-MCNC: 10 MG/DL (ref 8.3–10.6)
CHLORIDE SERPL-SCNC: 103 MMOL/L (ref 99–110)
CHOLEST SERPL-MCNC: 185 MG/DL (ref 0–199)
CO2 SERPL-SCNC: 27 MMOL/L (ref 21–32)
CREAT SERPL-MCNC: 0.9 MG/DL (ref 0.6–1.2)
GFR SERPLBLD CREATININE-BSD FMLA CKD-EPI: 60 ML/MIN/{1.73_M2}
GLUCOSE SERPL-MCNC: 138 MG/DL (ref 70–99)
HDLC SERPL-MCNC: 51 MG/DL (ref 40–60)
LDL CHOLESTEROL: 106 MG/DL
POTASSIUM SERPL-SCNC: 4.4 MMOL/L (ref 3.5–5.1)
PROT SERPL-MCNC: 7 G/DL (ref 6.4–8.2)
SODIUM SERPL-SCNC: 141 MMOL/L (ref 136–145)
TRIGL SERPL-MCNC: 138 MG/DL (ref 0–150)
VLDLC SERPL CALC-MCNC: 28 MG/DL

## 2024-11-26 PROCEDURE — 36415 COLL VENOUS BLD VENIPUNCTURE: CPT | Performed by: INTERNAL MEDICINE

## 2024-11-30 SDOH — ECONOMIC STABILITY: INCOME INSECURITY: HOW HARD IS IT FOR YOU TO PAY FOR THE VERY BASICS LIKE FOOD, HOUSING, MEDICAL CARE, AND HEATING?: NOT HARD AT ALL

## 2024-11-30 SDOH — ECONOMIC STABILITY: FOOD INSECURITY: WITHIN THE PAST 12 MONTHS, YOU WORRIED THAT YOUR FOOD WOULD RUN OUT BEFORE YOU GOT MONEY TO BUY MORE.: NEVER TRUE

## 2024-11-30 SDOH — ECONOMIC STABILITY: FOOD INSECURITY: WITHIN THE PAST 12 MONTHS, THE FOOD YOU BOUGHT JUST DIDN'T LAST AND YOU DIDN'T HAVE MONEY TO GET MORE.: NEVER TRUE

## 2024-12-03 ENCOUNTER — OFFICE VISIT (OUTPATIENT)
Age: 88
End: 2024-12-03
Payer: MEDICARE

## 2024-12-03 VITALS
BODY MASS INDEX: 20.87 KG/M2 | WEIGHT: 117.8 LBS | HEART RATE: 72 BPM | OXYGEN SATURATION: 98 % | RESPIRATION RATE: 16 BRPM | SYSTOLIC BLOOD PRESSURE: 138 MMHG | DIASTOLIC BLOOD PRESSURE: 64 MMHG | TEMPERATURE: 98.1 F

## 2024-12-03 DIAGNOSIS — E78.2 MIXED HYPERLIPIDEMIA: ICD-10-CM

## 2024-12-03 DIAGNOSIS — Z85.3 HISTORY OF MALIGNANT NEOPLASM OF LEFT BREAST: ICD-10-CM

## 2024-12-03 DIAGNOSIS — G20.A1 PARKINSON'S DISEASE WITHOUT DYSKINESIA OR FLUCTUATING MANIFESTATIONS (HCC): ICD-10-CM

## 2024-12-03 DIAGNOSIS — J30.89 NON-SEASONAL ALLERGIC RHINITIS, UNSPECIFIED TRIGGER: ICD-10-CM

## 2024-12-03 DIAGNOSIS — I77.1 STENOSIS OF LEFT SUBCLAVIAN ARTERY (HCC): ICD-10-CM

## 2024-12-03 DIAGNOSIS — E11.9 DIABETES MELLITUS TYPE 2 IN NONOBESE (HCC): Primary | ICD-10-CM

## 2024-12-03 DIAGNOSIS — I25.119 CORONARY ARTERY DISEASE INVOLVING NATIVE CORONARY ARTERY OF NATIVE HEART WITH ANGINA PECTORIS (HCC): ICD-10-CM

## 2024-12-03 DIAGNOSIS — I72.8 SPLENIC ARTERY ANEURYSM (HCC): ICD-10-CM

## 2024-12-03 PROBLEM — R10.32 LLQ ABDOMINAL PAIN: Status: RESOLVED | Noted: 2024-05-20 | Resolved: 2024-12-03

## 2024-12-03 PROCEDURE — 1123F ACP DISCUSS/DSCN MKR DOCD: CPT | Performed by: INTERNAL MEDICINE

## 2024-12-03 PROCEDURE — 1159F MED LIST DOCD IN RCRD: CPT | Performed by: INTERNAL MEDICINE

## 2024-12-03 PROCEDURE — 1090F PRES/ABSN URINE INCON ASSESS: CPT | Performed by: INTERNAL MEDICINE

## 2024-12-03 PROCEDURE — G8427 DOCREV CUR MEDS BY ELIG CLIN: HCPCS | Performed by: INTERNAL MEDICINE

## 2024-12-03 PROCEDURE — G8484 FLU IMMUNIZE NO ADMIN: HCPCS | Performed by: INTERNAL MEDICINE

## 2024-12-03 PROCEDURE — 3044F HG A1C LEVEL LT 7.0%: CPT | Performed by: INTERNAL MEDICINE

## 2024-12-03 PROCEDURE — 99214 OFFICE O/P EST MOD 30 MIN: CPT | Performed by: INTERNAL MEDICINE

## 2024-12-03 PROCEDURE — G8420 CALC BMI NORM PARAMETERS: HCPCS | Performed by: INTERNAL MEDICINE

## 2024-12-03 PROCEDURE — 1036F TOBACCO NON-USER: CPT | Performed by: INTERNAL MEDICINE

## 2024-12-03 SDOH — ECONOMIC STABILITY: INCOME INSECURITY: HOW HARD IS IT FOR YOU TO PAY FOR THE VERY BASICS LIKE FOOD, HOUSING, MEDICAL CARE, AND HEATING?: NOT VERY HARD

## 2024-12-03 SDOH — ECONOMIC STABILITY: FOOD INSECURITY: WITHIN THE PAST 12 MONTHS, YOU WORRIED THAT YOUR FOOD WOULD RUN OUT BEFORE YOU GOT MONEY TO BUY MORE.: NEVER TRUE

## 2024-12-03 SDOH — ECONOMIC STABILITY: FOOD INSECURITY: WITHIN THE PAST 12 MONTHS, THE FOOD YOU BOUGHT JUST DIDN'T LAST AND YOU DIDN'T HAVE MONEY TO GET MORE.: NEVER TRUE

## 2024-12-03 ASSESSMENT — PATIENT HEALTH QUESTIONNAIRE - PHQ9
1. LITTLE INTEREST OR PLEASURE IN DOING THINGS: NOT AT ALL
SUM OF ALL RESPONSES TO PHQ9 QUESTIONS 1 & 2: 0
SUM OF ALL RESPONSES TO PHQ QUESTIONS 1-9: 0
SUM OF ALL RESPONSES TO PHQ QUESTIONS 1-9: 0
2. FEELING DOWN, DEPRESSED OR HOPELESS: NOT AT ALL
SUM OF ALL RESPONSES TO PHQ QUESTIONS 1-9: 0
SUM OF ALL RESPONSES TO PHQ QUESTIONS 1-9: 0

## 2024-12-03 NOTE — PROGRESS NOTES
Lab Results   Component Value Date/Time    BUN 16 11/26/2024 09:51 AM    CREATININE 0.9 11/26/2024 09:51 AM     11/26/2024 09:51 AM    K 4.4 11/26/2024 09:51 AM    ALKPHOS 89 11/26/2024 09:51 AM    ALT 13 11/26/2024 09:51 AM    AST 19 11/26/2024 09:51 AM    GLUCOSE 138 11/26/2024 09:51 AM    GLUF 169 03/12/2018 12:30 PM     PT/INR: No results found for: \"INR\"  A1C:   Lab Results   Component Value Date    LABA1C 6.8 05/20/2024           Andrae Amaro MD, 12/3/2024 , 11:31 AM

## 2024-12-05 ENCOUNTER — HOSPITAL ENCOUNTER (EMERGENCY)
Age: 88
Discharge: HOME | End: 2024-12-06
Attending: EMERGENCY MEDICINE
Payer: MEDICARE
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
## 2024-12-05 LAB
ALBUMIN SERPL-MCNC: 4 G/DL (ref 3.4–5)
ALBUMIN/GLOB SERPL: 1.4 {RATIO} (ref 1.1–2.2)
ALP SERPL-CCNC: 80 U/L (ref 40–129)
ALT SERPL-CCNC: 8 U/L (ref 10–40)
AMORPH SED URNS QL MICRO: PRESENT
ANION GAP SERPL CALCULATED.3IONS-SCNC: 13 MMOL/L (ref 4–16)
AST SERPL-CCNC: 16 U/L (ref 15–37)
BACTERIA URNS QL MICRO: ABNORMAL
BASOPHILS # BLD: 0.07 K/UL
BASOPHILS NFR BLD: 1 % (ref 0–1)
BILIRUB SERPL-MCNC: 0.4 MG/DL (ref 0–1)
BILIRUB UR QL STRIP: NEGATIVE
BUN SERPL-MCNC: 20 MG/DL (ref 6–23)
CALCIUM SERPL-MCNC: 9.6 MG/DL (ref 8.3–10.6)
CHLORIDE SERPL-SCNC: 102 MMOL/L (ref 99–110)
CLARITY UR: CLEAR
CO2 SERPL-SCNC: 23 MMOL/L (ref 21–32)
COLOR UR: YELLOW
CREAT SERPL-MCNC: 0.8 MG/DL (ref 0.6–1.1)
EOSINOPHIL # BLD: 0.33 K/UL
EOSINOPHILS RELATIVE PERCENT: 3 % (ref 0–3)
ERYTHROCYTE [DISTWIDTH] IN BLOOD BY AUTOMATED COUNT: 13.4 % (ref 11.7–14.9)
GFR, ESTIMATED: 69 ML/MIN/1.73M2
GLUCOSE SERPL-MCNC: 187 MG/DL (ref 70–99)
GLUCOSE UR STRIP-MCNC: NEGATIVE MG/DL
HCT VFR BLD AUTO: 43.1 % (ref 37–47)
HGB BLD-MCNC: 13.8 G/DL (ref 12.5–16)
HGB UR QL STRIP.AUTO: ABNORMAL
IMM GRANULOCYTES # BLD AUTO: 0.04 K/UL
IMM GRANULOCYTES NFR BLD: 0 %
KETONES UR STRIP-MCNC: NEGATIVE MG/DL
LEUKOCYTE ESTERASE UR QL STRIP: ABNORMAL
LYMPHOCYTES NFR BLD: 1.49 K/UL
LYMPHOCYTES RELATIVE PERCENT: 13 % (ref 24–44)
MCH RBC QN AUTO: 27.3 PG (ref 27–31)
MCHC RBC AUTO-ENTMCNC: 32 G/DL (ref 32–36)
MCV RBC AUTO: 85.3 FL (ref 78–100)
MONOCYTES NFR BLD: 1.08 K/UL
MONOCYTES NFR BLD: 9 % (ref 0–4)
NEUTROPHILS NFR BLD: 75 % (ref 36–66)
NEUTS SEG NFR BLD: 8.88 K/UL
NITRITE UR QL STRIP: POSITIVE
PH UR STRIP: 7 [PH] (ref 5–8)
PLATELET # BLD AUTO: 270 K/UL (ref 140–440)
PMV BLD AUTO: 10.6 FL (ref 7.5–11.1)
POTASSIUM SERPL-SCNC: 3.9 MMOL/L (ref 3.5–5.1)
PROT SERPL-MCNC: 6.9 G/DL (ref 6.4–8.2)
PROT UR STRIP-MCNC: NEGATIVE MG/DL
RBC # BLD AUTO: 5.05 M/UL (ref 4.2–5.4)
RBC #/AREA URNS HPF: ABNORMAL /HPF
SODIUM SERPL-SCNC: 138 MMOL/L (ref 135–145)
SP GR UR STRIP: 1.01 (ref 1–1.03)
TROPONIN I SERPL HS-MCNC: 23 NG/L (ref 0–13)
UROBILINOGEN UR STRIP-ACNC: 0.2 EU/DL (ref 0–1)
WBC #/AREA URNS HPF: ABNORMAL /HPF
WBC OTHER # BLD: 11.9 K/UL (ref 4–10.5)

## 2024-12-05 PROCEDURE — 99284 EMERGENCY DEPT VISIT MOD MDM: CPT

## 2024-12-05 PROCEDURE — 81001 URINALYSIS AUTO W/SCOPE: CPT

## 2024-12-05 PROCEDURE — 80053 COMPREHEN METABOLIC PANEL: CPT

## 2024-12-05 PROCEDURE — 2580000003 HC RX 258: Performed by: EMERGENCY MEDICINE

## 2024-12-05 PROCEDURE — 85025 COMPLETE CBC W/AUTO DIFF WBC: CPT

## 2024-12-05 PROCEDURE — 93005 ELECTROCARDIOGRAM TRACING: CPT | Performed by: EMERGENCY MEDICINE

## 2024-12-05 PROCEDURE — 84484 ASSAY OF TROPONIN QUANT: CPT

## 2024-12-05 RX ORDER — 0.9 % SODIUM CHLORIDE 0.9 %
500 INTRAVENOUS SOLUTION INTRAVENOUS ONCE
Status: COMPLETED | OUTPATIENT
Start: 2024-12-05 | End: 2024-12-05

## 2024-12-05 RX ADMIN — SODIUM CHLORIDE 500 ML: 9 INJECTION, SOLUTION INTRAVENOUS at 22:56

## 2024-12-05 ASSESSMENT — PAIN SCALES - GENERAL: PAINLEVEL_OUTOF10: 0

## 2024-12-05 ASSESSMENT — PAIN - FUNCTIONAL ASSESSMENT: PAIN_FUNCTIONAL_ASSESSMENT: 0-10

## 2024-12-06 VITALS
HEIGHT: 63 IN | OXYGEN SATURATION: 99 % | SYSTOLIC BLOOD PRESSURE: 131 MMHG | DIASTOLIC BLOOD PRESSURE: 78 MMHG | HEART RATE: 89 BPM | WEIGHT: 117 LBS | BODY MASS INDEX: 20.73 KG/M2 | TEMPERATURE: 97.8 F | RESPIRATION RATE: 16 BRPM

## 2024-12-06 LAB
EKG ATRIAL RATE: 93 BPM
EKG DIAGNOSIS: NORMAL
EKG P AXIS: 59 DEGREES
EKG P-R INTERVAL: 208 MS
EKG Q-T INTERVAL: 374 MS
EKG QRS DURATION: 74 MS
EKG QTC CALCULATION (BAZETT): 465 MS
EKG R AXIS: 56 DEGREES
EKG T AXIS: 32 DEGREES
EKG VENTRICULAR RATE: 93 BPM
TROPONIN I SERPL HS-MCNC: 20 NG/L (ref 0–13)

## 2024-12-06 PROCEDURE — 93010 ELECTROCARDIOGRAM REPORT: CPT | Performed by: INTERNAL MEDICINE

## 2024-12-06 PROCEDURE — 84484 ASSAY OF TROPONIN QUANT: CPT

## 2024-12-06 NOTE — DISCHARGE INSTRUCTIONS
Return to the emergency department for any problems or concerns.  Follow-up with your primary caregiver in a week or so as needed.

## 2024-12-06 NOTE — ED PROVIDER NOTES
Emergency Department Encounter  Location: [facility] EMERGENCY DEPARTMENT    Patient: July Yu  MRN: [id number]  : 1932  Date of evaluation: 2024  ED Provider: Rommel Waller DO, FACEP    Chief Complaint:    Fall    [place]:  July Yu is a 89 y.o. female that presents to the emergency department by his quad with complaints of fall at home.  The patient states that she was going into her laundry room and she slipped on the freshly polished floor.  She states that she was unable to get herself up and she is not sure who called the squad.  She states her daughter called her neighbor to check on her.  She states she was able to get to the door and was able to open the inside door but could not open her storm door.  The squad was called and the storm door was open and she was brought to the emergency department.  The patient is denying any pain.  She states that she was not on the floor for very long.  She had fallen in 2019 and had to lay on the floor for several hours before she was found and she states that is not the case tonight.  She denies hitting her head.  She denies any back pain or neck pain.  She denies any hip pain.  She denies any knee pain or lower extremity pain.  She denies any elbow wrist or hand pain bilaterally.  She is denying any chest pain or shortness of breath.  Our nurse discussed her incident with her sister who is listed as her first contact and evidently this fall occurred around 5 PM.  The patient then called her daughter and daughter stated that she was able to to get to the phone but did not feel like cooking for herself so her daughter ordered her pizza.  The pizza was delivered and she was able to eat it however she states she had to \"scoot around\" in the apartment.  Her son who is now at the bedside states that he called a friend to come over and see her because she was not answering her phone and the police department was called for a wellness check.  She

## 2025-03-14 SDOH — ECONOMIC STABILITY: FOOD INSECURITY: WITHIN THE PAST 12 MONTHS, THE FOOD YOU BOUGHT JUST DIDN'T LAST AND YOU DIDN'T HAVE MONEY TO GET MORE.: NEVER TRUE

## 2025-03-14 SDOH — ECONOMIC STABILITY: INCOME INSECURITY: IN THE LAST 12 MONTHS, WAS THERE A TIME WHEN YOU WERE NOT ABLE TO PAY THE MORTGAGE OR RENT ON TIME?: NO

## 2025-03-14 SDOH — ECONOMIC STABILITY: FOOD INSECURITY: WITHIN THE PAST 12 MONTHS, YOU WORRIED THAT YOUR FOOD WOULD RUN OUT BEFORE YOU GOT MONEY TO BUY MORE.: NEVER TRUE

## 2025-03-17 ENCOUNTER — OFFICE VISIT (OUTPATIENT)
Age: 89
End: 2025-03-17
Payer: MEDICARE

## 2025-03-17 VITALS
SYSTOLIC BLOOD PRESSURE: 134 MMHG | DIASTOLIC BLOOD PRESSURE: 72 MMHG | HEART RATE: 76 BPM | WEIGHT: 116.4 LBS | TEMPERATURE: 98.4 F | HEIGHT: 62 IN | BODY MASS INDEX: 21.42 KG/M2 | OXYGEN SATURATION: 97 % | RESPIRATION RATE: 12 BRPM

## 2025-03-17 VITALS
SYSTOLIC BLOOD PRESSURE: 134 MMHG | BODY MASS INDEX: 21.42 KG/M2 | HEIGHT: 62 IN | RESPIRATION RATE: 12 BRPM | OXYGEN SATURATION: 97 % | WEIGHT: 116.4 LBS | DIASTOLIC BLOOD PRESSURE: 72 MMHG | HEART RATE: 76 BPM | TEMPERATURE: 98.4 F

## 2025-03-17 DIAGNOSIS — J30.9 ALLERGIC RHINITIS, UNSPECIFIED SEASONALITY, UNSPECIFIED TRIGGER: ICD-10-CM

## 2025-03-17 DIAGNOSIS — I72.8 SPLENIC ARTERY ANEURYSM: ICD-10-CM

## 2025-03-17 DIAGNOSIS — Z00.00 MEDICARE ANNUAL WELLNESS VISIT, SUBSEQUENT: Primary | ICD-10-CM

## 2025-03-17 DIAGNOSIS — Z85.3 HISTORY OF MALIGNANT NEOPLASM OF LEFT BREAST: ICD-10-CM

## 2025-03-17 DIAGNOSIS — E11.9 DIABETES MELLITUS TYPE 2 IN NONOBESE (HCC): Primary | ICD-10-CM

## 2025-03-17 DIAGNOSIS — Z28.20 VACCINE REFUSED BY PATIENT: ICD-10-CM

## 2025-03-17 DIAGNOSIS — G20.A1 PARKINSON'S DISEASE WITHOUT DYSKINESIA OR FLUCTUATING MANIFESTATIONS (HCC): ICD-10-CM

## 2025-03-17 DIAGNOSIS — N39.46 MIXED STRESS AND URGE URINARY INCONTINENCE: ICD-10-CM

## 2025-03-17 DIAGNOSIS — Z91.81 AT HIGH RISK FOR FALLS: ICD-10-CM

## 2025-03-17 DIAGNOSIS — I25.119 CORONARY ARTERY DISEASE INVOLVING NATIVE CORONARY ARTERY OF NATIVE HEART WITH ANGINA PECTORIS: ICD-10-CM

## 2025-03-17 DIAGNOSIS — E78.2 MIXED HYPERLIPIDEMIA: ICD-10-CM

## 2025-03-17 LAB
BILIRUBIN, POC: NEGATIVE
BLOOD URINE, POC: ABNORMAL
CLARITY, POC: ABNORMAL
COLOR, POC: YELLOW
GLUCOSE URINE, POC: NEGATIVE MG/DL
HBA1C MFR BLD: 6.7 %
KETONES, POC: NEGATIVE MG/DL
LEUKOCYTE EST, POC: ABNORMAL
NITRITE, POC: POSITIVE
PH, POC: 7.5
PROTEIN, POC: NEGATIVE MG/DL
SPECIFIC GRAVITY, POC: 1.01
UROBILINOGEN, POC: 0.2 MG/DL

## 2025-03-17 PROCEDURE — 1159F MED LIST DOCD IN RCRD: CPT | Performed by: INTERNAL MEDICINE

## 2025-03-17 PROCEDURE — 99214 OFFICE O/P EST MOD 30 MIN: CPT | Performed by: INTERNAL MEDICINE

## 2025-03-17 PROCEDURE — 1123F ACP DISCUSS/DSCN MKR DOCD: CPT | Performed by: INTERNAL MEDICINE

## 2025-03-17 PROCEDURE — 0509F URINE INCON PLAN DOCD: CPT | Performed by: INTERNAL MEDICINE

## 2025-03-17 PROCEDURE — 1036F TOBACCO NON-USER: CPT | Performed by: INTERNAL MEDICINE

## 2025-03-17 PROCEDURE — 1090F PRES/ABSN URINE INCON ASSESS: CPT | Performed by: INTERNAL MEDICINE

## 2025-03-17 PROCEDURE — G8427 DOCREV CUR MEDS BY ELIG CLIN: HCPCS | Performed by: INTERNAL MEDICINE

## 2025-03-17 PROCEDURE — G8420 CALC BMI NORM PARAMETERS: HCPCS | Performed by: INTERNAL MEDICINE

## 2025-03-17 PROCEDURE — G0439 PPPS, SUBSEQ VISIT: HCPCS | Performed by: INTERNAL MEDICINE

## 2025-03-17 PROCEDURE — 83036 HEMOGLOBIN GLYCOSYLATED A1C: CPT | Performed by: INTERNAL MEDICINE

## 2025-03-17 PROCEDURE — 81002 URINALYSIS NONAUTO W/O SCOPE: CPT | Performed by: INTERNAL MEDICINE

## 2025-03-17 ASSESSMENT — PATIENT HEALTH QUESTIONNAIRE - PHQ9
SUM OF ALL RESPONSES TO PHQ QUESTIONS 1-9: 0
SUM OF ALL RESPONSES TO PHQ QUESTIONS 1-9: 0
2. FEELING DOWN, DEPRESSED OR HOPELESS: NOT AT ALL
SUM OF ALL RESPONSES TO PHQ QUESTIONS 1-9: 0
1. LITTLE INTEREST OR PLEASURE IN DOING THINGS: NOT AT ALL
SUM OF ALL RESPONSES TO PHQ QUESTIONS 1-9: 0

## 2025-03-17 NOTE — PROGRESS NOTES
breast  Overview:  Left breast, Radiation therapy, lumpectomy 1985  Does not see any surgeon in f/u  Gets mammo every year  Last mammogram 2/2019 :   Does not want test now.   6. Non-seasonal allergic rhinitis, unspecified trigger  Overview:  Patient has allergic rhinitis and is taking Flonase only prn   7. Parkinson's disease without dyskinesia or fluctuating manifestations (HCC)  Overview:  She saw a neurologist Dr Stroud, but doctor moved  Pt took sinemet but stopped it as it caused faigue  Referred to Dr Jimenez. Recommended to continue SInemet but patient is not taking  In f/u with NP as patient had stopped medication it was felt as patient was able to perform her ADL : to leave her with out any med  Pt states tremors get worse when she is stressed out. Does not want to see neurologist   8. Splenic artery aneurysm (HCC)  Overview:  CT abdomen 5/2024 : splenic art aneurysm 12 mm :  which typically don't become an issue until greater than 2 cm : observe for now     Refused vaccines again  Return to office in 3 months.    Medications were reviewed with the patient. Continue current medications.  Appropriate prescriptions were addressed. After visit jose jy provided.  Follow up as directed : sooner if needed.  Questions were answered and patient verbalized understanding. Call for any problems, questions, or concerns.       Allergies   Allergen Reactions    Eggs Or Egg-Derived Products [Egg-Derived Products] Hives    Iodine      Iodine contrast     Current Outpatient Medications   Medication Sig Dispense Refill    latanoprost (XALATAN) 0.005 % ophthalmic solution Place 1 drop into both eyes nightly      nitroGLYCERIN (NITROSTAT) 0.4 MG SL tablet up to max of 3 total doses. If no relief after 1 dose, call 911. 25 tablet 3    carvedilol (COREG) 12.5 MG tablet Take 1 tablet by mouth 2 times daily 60 tablet 5    fluticasone (FLONASE) 50 MCG/ACT nasal spray SPRAY 1 SPRAY BY NASAL ROUTE EVERY DAY 1 each 3    blood glucose

## 2025-03-17 NOTE — PATIENT INSTRUCTIONS
may run in your family, and various assessments and screenings as appropriate.  After reviewing your medical record and screening and assessments performed today your provider may have ordered immunizations, labs, imaging, and/or referrals for you.  A list of these orders (if applicable) as well as your Preventive Care list are included within your After Visit Summary for your review.
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
## 2025-03-18 LAB — BACTERIA UR CULT: NORMAL

## 2025-03-21 ENCOUNTER — RESULTS FOLLOW-UP (OUTPATIENT)
Age: 89
End: 2025-03-21

## 2025-06-10 ENCOUNTER — LAB (OUTPATIENT)
Age: 89
End: 2025-06-10
Payer: MEDICARE

## 2025-06-10 DIAGNOSIS — E78.2 MIXED HYPERLIPIDEMIA: ICD-10-CM

## 2025-06-10 LAB
ALBUMIN SERPL-MCNC: 4.4 G/DL (ref 3.4–5)
ALBUMIN/GLOB SERPL: 1.6 {RATIO} (ref 1.1–2.2)
ALP SERPL-CCNC: 87 U/L (ref 40–129)
ALT SERPL-CCNC: 16 U/L (ref 10–40)
ANION GAP SERPL CALCULATED.3IONS-SCNC: 11 MMOL/L (ref 3–16)
AST SERPL-CCNC: 20 U/L (ref 15–37)
BILIRUB SERPL-MCNC: 0.7 MG/DL (ref 0–1)
BUN SERPL-MCNC: 15 MG/DL (ref 7–20)
CALCIUM SERPL-MCNC: 9.4 MG/DL (ref 8.3–10.6)
CHLORIDE SERPL-SCNC: 102 MMOL/L (ref 99–110)
CHOLEST SERPL-MCNC: 164 MG/DL (ref 0–199)
CO2 SERPL-SCNC: 26 MMOL/L (ref 21–32)
CREAT SERPL-MCNC: 0.9 MG/DL (ref 0.6–1.2)
GFR SERPLBLD CREATININE-BSD FMLA CKD-EPI: 60 ML/MIN/{1.73_M2}
GLUCOSE SERPL-MCNC: 144 MG/DL (ref 70–99)
HDLC SERPL-MCNC: 48 MG/DL (ref 40–60)
LDL CHOLESTEROL: 97 MG/DL
POTASSIUM SERPL-SCNC: 4.3 MMOL/L (ref 3.5–5.1)
PROT SERPL-MCNC: 7.1 G/DL (ref 6.4–8.2)
SODIUM SERPL-SCNC: 139 MMOL/L (ref 136–145)
TRIGL SERPL-MCNC: 95 MG/DL (ref 0–150)
VLDLC SERPL CALC-MCNC: 19 MG/DL

## 2025-06-10 PROCEDURE — 36415 COLL VENOUS BLD VENIPUNCTURE: CPT | Performed by: INTERNAL MEDICINE

## 2025-06-19 ENCOUNTER — OFFICE VISIT (OUTPATIENT)
Age: 89
End: 2025-06-19

## 2025-06-19 VITALS
SYSTOLIC BLOOD PRESSURE: 130 MMHG | HEART RATE: 76 BPM | BODY MASS INDEX: 21.29 KG/M2 | DIASTOLIC BLOOD PRESSURE: 70 MMHG | OXYGEN SATURATION: 97 % | RESPIRATION RATE: 12 BRPM | TEMPERATURE: 98.1 F | WEIGHT: 116.4 LBS

## 2025-06-19 DIAGNOSIS — E78.2 MIXED HYPERLIPIDEMIA: ICD-10-CM

## 2025-06-19 DIAGNOSIS — H40.89 OTHER GLAUCOMA OF BOTH EYES: ICD-10-CM

## 2025-06-19 DIAGNOSIS — I25.119 CORONARY ARTERY DISEASE INVOLVING NATIVE CORONARY ARTERY OF NATIVE HEART WITH ANGINA PECTORIS: ICD-10-CM

## 2025-06-19 DIAGNOSIS — Z85.3 HISTORY OF MALIGNANT NEOPLASM OF LEFT BREAST: ICD-10-CM

## 2025-06-19 DIAGNOSIS — E11.9 DIABETES MELLITUS TYPE 2 IN NONOBESE (HCC): ICD-10-CM

## 2025-06-19 DIAGNOSIS — Z28.20 VACCINE REFUSED BY PATIENT: ICD-10-CM

## 2025-06-19 DIAGNOSIS — G20.B1 PARKINSON'S DISEASE WITH DYSKINESIA, UNSPECIFIED WHETHER MANIFESTATIONS FLUCTUATE (HCC): Primary | ICD-10-CM

## 2025-06-19 RX ORDER — BRIMONIDINE TARTRATE 2 MG/ML
1 SOLUTION/ DROPS OPHTHALMIC 2 TIMES DAILY
COMMUNITY
Start: 2025-04-30

## 2025-06-19 RX ORDER — TIMOLOL MALEATE 5 MG/ML
1 SOLUTION/ DROPS OPHTHALMIC 2 TIMES DAILY
COMMUNITY
Start: 2025-06-11

## 2025-06-19 RX ORDER — METFORMIN HYDROCHLORIDE 500 MG/1
500 TABLET, EXTENDED RELEASE ORAL
Qty: 90 TABLET | Refills: 1 | Status: SHIPPED | OUTPATIENT
Start: 2025-06-19

## 2025-06-19 ASSESSMENT — PATIENT HEALTH QUESTIONNAIRE - PHQ9
SUM OF ALL RESPONSES TO PHQ QUESTIONS 1-9: 0
1. LITTLE INTEREST OR PLEASURE IN DOING THINGS: NOT AT ALL
SUM OF ALL RESPONSES TO PHQ QUESTIONS 1-9: 0
2. FEELING DOWN, DEPRESSED OR HOPELESS: NOT AT ALL
SUM OF ALL RESPONSES TO PHQ QUESTIONS 1-9: 0
SUM OF ALL RESPONSES TO PHQ QUESTIONS 1-9: 0

## 2025-06-19 NOTE — PROGRESS NOTES
Follow-up appointment scheduled in 3 months.      Medications were reviewed with the patient. Continue current medications.  Appropriate prescriptions were addressed. After visit xavier provided.  Follow up as directed : sooner if needed.  Questions were answered and patient verbalized understanding. Call for any problems, questions, or concerns.       Allergies   Allergen Reactions    Eggs Or Egg-Derived Products [Egg-Derived Products] Hives    Iodine      Iodine contrast     Current Outpatient Medications   Medication Sig Dispense Refill    timolol (TIMOPTIC) 0.5 % ophthalmic solution Place 1 drop into both eyes in the morning and at bedtime      brimonidine (ALPHAGAN) 0.2 % ophthalmic solution Place 1 drop into the left eye in the morning and at bedtime      nitroGLYCERIN (NITROSTAT) 0.4 MG SL tablet up to max of 3 total doses. If no relief after 1 dose, call 911. 25 tablet 3    carvedilol (COREG) 12.5 MG tablet Take 1 tablet by mouth 2 times daily 60 tablet 5    fluticasone (FLONASE) 50 MCG/ACT nasal spray SPRAY 1 SPRAY BY NASAL ROUTE EVERY DAY 1 each 3    blood glucose test strips (FREESTYLE LITE) strip PATIENT TESTING ONCE DAILY AND AS NEEDED, DX=E11.9 50 strip 3     No current facility-administered medications for this visit.     Past Medical History:   Diagnosis Date    CAD (coronary artery disease)     suspected, refused w/u    Coronary artery disease suspected. refused cardio     Suspected. Refused to see cardio.    Diabetes mellitus (HCC) since 4/08    Foot ulcer (HCC) left     resolved    Glaucoma     Dr. Waters    History of left heart catheterization 03/29/2019    EF > 55% 3 vessel CAD as described. In particular heavily calcified critical bifurcation of LAD/diagonal in addition to being small in size.    Hx of Doppler echocardiogram 03/28/2019    EF 50% Hypokinetic lateral wall. Grade 3 diastolic dysfucunction. Mild concentric LV hypertrophy. Mild pulmonary htn,mild mr , mod tr.    Hyperlipidemia

## 2025-08-23 ENCOUNTER — APPOINTMENT (OUTPATIENT)
Dept: CT IMAGING | Age: 89
DRG: 557 | End: 2025-08-23
Payer: MEDICARE

## 2025-08-23 ENCOUNTER — APPOINTMENT (OUTPATIENT)
Dept: GENERAL RADIOLOGY | Age: 89
DRG: 557 | End: 2025-08-23
Payer: MEDICARE

## 2025-08-23 ENCOUNTER — HOSPITAL ENCOUNTER (INPATIENT)
Age: 89
LOS: 3 days | Discharge: NURSING, REHAB OR LONG-TERM CARE | DRG: 557 | End: 2025-08-26
Attending: EMERGENCY MEDICINE | Admitting: HOSPITALIST
Payer: MEDICARE

## 2025-08-23 DIAGNOSIS — S09.90XA CLOSED HEAD INJURY, INITIAL ENCOUNTER: ICD-10-CM

## 2025-08-23 DIAGNOSIS — S00.81XA FACIAL ABRASION, INITIAL ENCOUNTER: ICD-10-CM
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
## 2025-08-23 DIAGNOSIS — S42.411A CLOSED SUPRACONDYLAR FRACTURE OF RIGHT HUMERUS, INITIAL ENCOUNTER: ICD-10-CM

## 2025-08-23 DIAGNOSIS — N39.0 ACUTE LOWER URINARY TRACT INFECTION: ICD-10-CM

## 2025-08-23 DIAGNOSIS — R74.8 ELEVATED CK: ICD-10-CM

## 2025-08-23 PROBLEM — S42.351A CLOSED DISPLACED COMMINUTED FRACTURE OF SHAFT OF RIGHT HUMERUS: Status: ACTIVE | Noted: 2025-08-23

## 2025-08-23 PROBLEM — G93.40 ACUTE ENCEPHALOPATHY: Status: ACTIVE | Noted: 2025-08-23

## 2025-08-23 PROBLEM — N30.00 ACUTE CYSTITIS WITHOUT HEMATURIA: Status: ACTIVE | Noted: 2025-08-23
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
## 2025-08-23 LAB
25(OH)D3 SERPL-MCNC: 32.5 NG/ML (ref 30–150)
ALBUMIN SERPL-MCNC: 3.8 G/DL (ref 3.4–5)
ALBUMIN/GLOB SERPL: 1.8 {RATIO}
ALP SERPL-CCNC: 70 U/L (ref 40–129)
ALT SERPL-CCNC: 18 U/L (ref 10–40)
AMMONIA PLAS-SCNC: 13 UMOL/L (ref 11–51)
ANION GAP SERPL CALCULATED.3IONS-SCNC: 12 MMOL/L (ref 9–17)
AST SERPL-CCNC: 45 U/L (ref 15–37)
BACTERIA URNS QL MICRO: ABNORMAL
BASOPHILS # BLD: 0.02 K/UL
BASOPHILS NFR BLD: 0 % (ref 0–1)
BILIRUB SERPL-MCNC: 1 MG/DL (ref 0–1)
BILIRUB UR QL STRIP: NEGATIVE
BUN SERPL-MCNC: 16 MG/DL (ref 7–20)
CALCIUM SERPL-MCNC: 8.5 MG/DL (ref 8.3–10.6)
CHLORIDE SERPL-SCNC: 104 MMOL/L (ref 99–110)
CK SERPL-CCNC: 1850 U/L (ref 26–192)
CLARITY UR: ABNORMAL
CO2 SERPL-SCNC: 22 MMOL/L (ref 21–32)
COLOR UR: YELLOW
CREAT SERPL-MCNC: 0.9 MG/DL (ref 0.6–1.2)
EKG ATRIAL RATE: 62 BPM
EKG DIAGNOSIS: NORMAL
EKG P AXIS: 65 DEGREES
EKG P-R INTERVAL: 154 MS
EKG Q-T INTERVAL: 452 MS
EKG QRS DURATION: 72 MS
EKG QTC CALCULATION (BAZETT): 458 MS
EKG R AXIS: 3 DEGREES
EKG T AXIS: 9 DEGREES
EKG VENTRICULAR RATE: 62 BPM
EOSINOPHIL # BLD: 0 K/UL
EOSINOPHILS RELATIVE PERCENT: 0 % (ref 0–3)
EPI CELLS #/AREA URNS HPF: ABNORMAL /HPF
ERYTHROCYTE [DISTWIDTH] IN BLOOD BY AUTOMATED COUNT: 13.4 % (ref 11.7–14.9)
EST. AVERAGE GLUCOSE BLD GHB EST-MCNC: 132 MG/DL
FOLATE SERPL-MCNC: 7.9 NG/ML (ref 4.8–24.2)
GFR, ESTIMATED: 60 ML/MIN/1.73M2
GLUCOSE BLD-MCNC: 141 MG/DL (ref 74–99)
GLUCOSE BLD-MCNC: 143 MG/DL (ref 74–99)
GLUCOSE SERPL-MCNC: 192 MG/DL (ref 74–99)
GLUCOSE UR STRIP-MCNC: NEGATIVE MG/DL
HBA1C MFR BLD: 6.2 % (ref 4.2–6.3)
HCT VFR BLD AUTO: 37.7 % (ref 37–47)
HGB BLD-MCNC: 12.6 G/DL (ref 12.5–16)
HGB UR QL STRIP.AUTO: ABNORMAL
IMM GRANULOCYTES # BLD AUTO: 0.03 K/UL
IMM GRANULOCYTES NFR BLD: 0 %
KETONES UR STRIP-MCNC: 15 MG/DL
LACTATE BLDV-SCNC: 1.3 MMOL/L (ref 0.4–2)
LACTATE BLDV-SCNC: 1.4 MMOL/L (ref 0.4–2)
LACTATE BLDV-SCNC: 2.8 MMOL/L (ref 0.4–2)
LEUKOCYTE ESTERASE UR QL STRIP: ABNORMAL
LYMPHOCYTES NFR BLD: 0.67 K/UL
LYMPHOCYTES RELATIVE PERCENT: 6 % (ref 24–44)
MCH RBC QN AUTO: 27.4 PG (ref 27–31)
MCHC RBC AUTO-ENTMCNC: 33.4 G/DL (ref 32–36)
MCV RBC AUTO: 82 FL (ref 78–100)
MONOCYTES NFR BLD: 0.87 K/UL
MONOCYTES NFR BLD: 8 % (ref 0–5)
NEUTROPHILS NFR BLD: 85 % (ref 36–66)
NEUTS SEG NFR BLD: 8.98 K/UL
NITRITE UR QL STRIP: NEGATIVE
PH UR STRIP: 8 [PH] (ref 5–8)
PLATELET # BLD AUTO: 275 K/UL (ref 140–440)
PMV BLD AUTO: 10.6 FL (ref 7.5–11.1)
POTASSIUM SERPL-SCNC: 4.3 MMOL/L (ref 3.5–5.1)
PROT SERPL-MCNC: 6 G/DL (ref 6.4–8.2)
PROT UR STRIP-MCNC: 100 MG/DL
RBC # BLD AUTO: 4.6 M/UL (ref 4.2–5.4)
RBC #/AREA URNS HPF: ABNORMAL /HPF
SODIUM SERPL-SCNC: 138 MMOL/L (ref 136–145)
SP GR UR STRIP: 1.02 (ref 1–1.03)
T4 FREE SERPL-MCNC: 1.5 NG/DL (ref 0.9–1.8)
TROPONIN I SERPL HS-MCNC: 13 NG/L (ref 0–14)
TROPONIN I SERPL HS-MCNC: 20 NG/L (ref 0–14)
TSH SERPL DL<=0.05 MIU/L-ACNC: 1.55 UIU/ML (ref 0.27–4.2)
UROBILINOGEN UR STRIP-ACNC: 0.2 EU/DL (ref 0–1)
VIT B12 SERPL-MCNC: 894 PG/ML (ref 211–911)
WBC #/AREA URNS HPF: ABNORMAL /HPF
WBC OTHER # BLD: 10.6 K/UL (ref 4–10.5)

## 2025-08-23 PROCEDURE — 99285 EMERGENCY DEPT VISIT HI MDM: CPT

## 2025-08-23 PROCEDURE — 93010 ELECTROCARDIOGRAM REPORT: CPT | Performed by: INTERNAL MEDICINE

## 2025-08-23 PROCEDURE — 96374 THER/PROPH/DIAG INJ IV PUSH: CPT

## 2025-08-23 PROCEDURE — 82306 VITAMIN D 25 HYDROXY: CPT

## 2025-08-23 PROCEDURE — 6370000000 HC RX 637 (ALT 250 FOR IP): Performed by: NURSE PRACTITIONER

## 2025-08-23 PROCEDURE — 82746 ASSAY OF FOLIC ACID SERUM: CPT

## 2025-08-23 PROCEDURE — 71250 CT THORAX DX C-: CPT

## 2025-08-23 PROCEDURE — 82962 GLUCOSE BLOOD TEST: CPT

## 2025-08-23 PROCEDURE — 83605 ASSAY OF LACTIC ACID: CPT

## 2025-08-23 PROCEDURE — 80053 COMPREHEN METABOLIC PANEL: CPT

## 2025-08-23 PROCEDURE — 72125 CT NECK SPINE W/O DYE: CPT

## 2025-08-23 PROCEDURE — 83036 HEMOGLOBIN GLYCOSYLATED A1C: CPT

## 2025-08-23 PROCEDURE — 82550 ASSAY OF CK (CPK): CPT

## 2025-08-23 PROCEDURE — 84484 ASSAY OF TROPONIN QUANT: CPT

## 2025-08-23 PROCEDURE — 2580000003 HC RX 258: Performed by: EMERGENCY MEDICINE

## 2025-08-23 PROCEDURE — 82607 VITAMIN B-12: CPT

## 2025-08-23 PROCEDURE — 70450 CT HEAD/BRAIN W/O DYE: CPT

## 2025-08-23 PROCEDURE — 2580000003 HC RX 258: Performed by: NURSE PRACTITIONER

## 2025-08-23 PROCEDURE — 81001 URINALYSIS AUTO W/SCOPE: CPT

## 2025-08-23 PROCEDURE — 6360000002 HC RX W HCPCS: Performed by: NURSE PRACTITIONER

## 2025-08-23 PROCEDURE — 2500000003 HC RX 250 WO HCPCS: Performed by: EMERGENCY MEDICINE

## 2025-08-23 PROCEDURE — 84443 ASSAY THYROID STIM HORMONE: CPT

## 2025-08-23 PROCEDURE — 82140 ASSAY OF AMMONIA: CPT

## 2025-08-23 PROCEDURE — 93005 ELECTROCARDIOGRAM TRACING: CPT | Performed by: EMERGENCY MEDICINE

## 2025-08-23 PROCEDURE — 76376 3D RENDER W/INTRP POSTPROCES: CPT

## 2025-08-23 PROCEDURE — 96361 HYDRATE IV INFUSION ADD-ON: CPT

## 2025-08-23 PROCEDURE — 87086 URINE CULTURE/COLONY COUNT: CPT

## 2025-08-23 PROCEDURE — 1200000000 HC SEMI PRIVATE

## 2025-08-23 PROCEDURE — 29105 APPLICATION LONG ARM SPLINT: CPT

## 2025-08-23 PROCEDURE — 73060 X-RAY EXAM OF HUMERUS: CPT

## 2025-08-23 PROCEDURE — 84439 ASSAY OF FREE THYROXINE: CPT

## 2025-08-23 PROCEDURE — 85025 COMPLETE CBC W/AUTO DIFF WBC: CPT

## 2025-08-23 PROCEDURE — 36415 COLL VENOUS BLD VENIPUNCTURE: CPT

## 2025-08-23 PROCEDURE — 74176 CT ABD & PELVIS W/O CONTRAST: CPT

## 2025-08-23 PROCEDURE — 6360000002 HC RX W HCPCS: Performed by: EMERGENCY MEDICINE

## 2025-08-23 RX ORDER — CARVEDILOL 12.5 MG/1
12.5 TABLET ORAL 2 TIMES DAILY
Status: DISCONTINUED | OUTPATIENT
Start: 2025-08-23 | End: 2025-08-24

## 2025-08-23 RX ORDER — HYDROCODONE BITARTRATE AND ACETAMINOPHEN 5; 325 MG/1; MG/1
1 TABLET ORAL EVERY 6 HOURS PRN
Status: DISCONTINUED | OUTPATIENT
Start: 2025-08-23 | End: 2025-08-26 | Stop reason: HOSPADM

## 2025-08-23 RX ORDER — FLUTICASONE PROPIONATE 50 MCG
1 SPRAY, SUSPENSION (ML) NASAL DAILY PRN
Status: DISCONTINUED | OUTPATIENT
Start: 2025-08-23 | End: 2025-08-26 | Stop reason: HOSPADM

## 2025-08-23 RX ORDER — SODIUM CHLORIDE 0.9 % (FLUSH) 0.9 %
5-40 SYRINGE (ML) INJECTION EVERY 12 HOURS SCHEDULED
Status: DISCONTINUED | OUTPATIENT
Start: 2025-08-23 | End: 2025-08-26

## 2025-08-23 RX ORDER — TIMOLOL MALEATE 5 MG/ML
1 SOLUTION/ DROPS OPHTHALMIC 2 TIMES DAILY
Status: DISCONTINUED | OUTPATIENT
Start: 2025-08-23 | End: 2025-08-26 | Stop reason: HOSPADM

## 2025-08-23 RX ORDER — SODIUM CHLORIDE 9 MG/ML
INJECTION, SOLUTION INTRAVENOUS PRN
Status: DISCONTINUED | OUTPATIENT
Start: 2025-08-23 | End: 2025-08-26 | Stop reason: HOSPADM

## 2025-08-23 RX ORDER — POTASSIUM CHLORIDE 1500 MG/1
40 TABLET, EXTENDED RELEASE ORAL PRN
Status: DISCONTINUED | OUTPATIENT
Start: 2025-08-23 | End: 2025-08-26

## 2025-08-23 RX ORDER — ACETAMINOPHEN 325 MG/1
650 TABLET ORAL EVERY 6 HOURS PRN
Status: DISCONTINUED | OUTPATIENT
Start: 2025-08-23 | End: 2025-08-26 | Stop reason: HOSPADM

## 2025-08-23 RX ORDER — SODIUM CHLORIDE 0.9 % (FLUSH) 0.9 %
5-40 SYRINGE (ML) INJECTION PRN
Status: DISCONTINUED | OUTPATIENT
Start: 2025-08-23 | End: 2025-08-26 | Stop reason: HOSPADM

## 2025-08-23 RX ORDER — SODIUM CHLORIDE, SODIUM LACTATE, POTASSIUM CHLORIDE, CALCIUM CHLORIDE 600; 310; 30; 20 MG/100ML; MG/100ML; MG/100ML; MG/100ML
INJECTION, SOLUTION INTRAVENOUS CONTINUOUS
Status: DISPENSED | OUTPATIENT
Start: 2025-08-23 | End: 2025-08-24

## 2025-08-23 RX ORDER — LATANOPROST 50 UG/ML
1 SOLUTION/ DROPS OPHTHALMIC NIGHTLY
Status: ON HOLD | COMMUNITY
Start: 2025-07-12

## 2025-08-23 RX ORDER — SENNA AND DOCUSATE SODIUM 50; 8.6 MG/1; MG/1
2 TABLET, FILM COATED ORAL DAILY PRN
Status: DISCONTINUED | OUTPATIENT
Start: 2025-08-23 | End: 2025-08-26 | Stop reason: HOSPADM

## 2025-08-23 RX ORDER — ACETAMINOPHEN 650 MG/1
650 SUPPOSITORY RECTAL EVERY 6 HOURS PRN
Status: DISCONTINUED | OUTPATIENT
Start: 2025-08-23 | End: 2025-08-26 | Stop reason: HOSPADM

## 2025-08-23 RX ORDER — ENOXAPARIN SODIUM 100 MG/ML
30 INJECTION SUBCUTANEOUS DAILY
Status: DISCONTINUED | OUTPATIENT
Start: 2025-08-23 | End: 2025-08-26 | Stop reason: HOSPADM

## 2025-08-23 RX ORDER — BRIMONIDINE TARTRATE 2 MG/ML
1 SOLUTION/ DROPS OPHTHALMIC 2 TIMES DAILY
Status: DISCONTINUED | OUTPATIENT
Start: 2025-08-23 | End: 2025-08-26 | Stop reason: HOSPADM

## 2025-08-23 RX ORDER — LATANOPROST 50 UG/ML
1 SOLUTION/ DROPS OPHTHALMIC NIGHTLY
Status: DISCONTINUED | OUTPATIENT
Start: 2025-08-23 | End: 2025-08-26 | Stop reason: HOSPADM

## 2025-08-23 RX ORDER — ONDANSETRON 4 MG/1
4 TABLET, ORALLY DISINTEGRATING ORAL EVERY 8 HOURS PRN
Status: DISCONTINUED | OUTPATIENT
Start: 2025-08-23 | End: 2025-08-26 | Stop reason: HOSPADM

## 2025-08-23 RX ORDER — DEXTROSE MONOHYDRATE 100 MG/ML
INJECTION, SOLUTION INTRAVENOUS CONTINUOUS PRN
Status: DISCONTINUED | OUTPATIENT
Start: 2025-08-23 | End: 2025-08-26 | Stop reason: HOSPADM

## 2025-08-23 RX ORDER — ONDANSETRON 2 MG/ML
4 INJECTION INTRAMUSCULAR; INTRAVENOUS EVERY 6 HOURS PRN
Status: DISCONTINUED | OUTPATIENT
Start: 2025-08-23 | End: 2025-08-26 | Stop reason: HOSPADM

## 2025-08-23 RX ORDER — GLUCAGON 1 MG/ML
1 KIT INJECTION PRN
Status: DISCONTINUED | OUTPATIENT
Start: 2025-08-23 | End: 2025-08-26 | Stop reason: HOSPADM

## 2025-08-23 RX ORDER — 0.9 % SODIUM CHLORIDE 0.9 %
1000 INTRAVENOUS SOLUTION INTRAVENOUS ONCE
Status: COMPLETED | OUTPATIENT
Start: 2025-08-23 | End: 2025-08-23

## 2025-08-23 RX ORDER — MAGNESIUM SULFATE IN WATER 40 MG/ML
2000 INJECTION, SOLUTION INTRAVENOUS PRN
Status: DISCONTINUED | OUTPATIENT
Start: 2025-08-23 | End: 2025-08-26

## 2025-08-23 RX ORDER — POTASSIUM CHLORIDE 7.45 MG/ML
10 INJECTION INTRAVENOUS PRN
Status: DISCONTINUED | OUTPATIENT
Start: 2025-08-23 | End: 2025-08-26

## 2025-08-23 RX ORDER — POLYETHYLENE GLYCOL 3350 17 G/17G
17 POWDER, FOR SOLUTION ORAL DAILY
Status: DISCONTINUED | OUTPATIENT
Start: 2025-08-23 | End: 2025-08-26 | Stop reason: HOSPADM

## 2025-08-23 RX ORDER — INSULIN LISPRO 100 [IU]/ML
0-4 INJECTION, SOLUTION INTRAVENOUS; SUBCUTANEOUS
Status: DISCONTINUED | OUTPATIENT
Start: 2025-08-23 | End: 2025-08-26 | Stop reason: HOSPADM

## 2025-08-23 RX ADMIN — SODIUM CHLORIDE 1000 ML: 0.9 INJECTION, SOLUTION INTRAVENOUS at 13:23

## 2025-08-23 RX ADMIN — WATER 1000 MG: 1 INJECTION INTRAMUSCULAR; INTRAVENOUS; SUBCUTANEOUS at 13:59

## 2025-08-23 RX ADMIN — BRIMONIDINE TARTRATE 1 DROP: 2 SOLUTION/ DROPS OPHTHALMIC at 20:27

## 2025-08-23 RX ADMIN — CARVEDILOL 12.5 MG: 12.5 TABLET, FILM COATED ORAL at 20:27

## 2025-08-23 RX ADMIN — ENOXAPARIN SODIUM 30 MG: 100 INJECTION SUBCUTANEOUS at 18:11

## 2025-08-23 RX ADMIN — SODIUM CHLORIDE, SODIUM LACTATE, POTASSIUM CHLORIDE, AND CALCIUM CHLORIDE: .6; .31; .03; .02 INJECTION, SOLUTION INTRAVENOUS at 17:11

## 2025-08-23 RX ADMIN — LATANOPROST 1 DROP: 50 SOLUTION OPHTHALMIC at 20:28

## 2025-08-23 RX ADMIN — POLYETHYLENE GLYCOL 3350 17 G: 17 POWDER, FOR SOLUTION ORAL at 18:11

## 2025-08-23 RX ADMIN — TIMOLOL MALEATE 1 DROP: 5 SOLUTION OPHTHALMIC at 20:27

## 2025-08-23 RX ADMIN — SODIUM CHLORIDE 1000 ML: 0.9 INJECTION, SOLUTION INTRAVENOUS at 12:29

## 2025-08-23 ASSESSMENT — PAIN SCALES - GENERAL
PAINLEVEL_OUTOF10: 3
PAINLEVEL_OUTOF10: 0
PAINLEVEL_OUTOF10: 5

## 2025-08-23 ASSESSMENT — PAIN DESCRIPTION - PAIN TYPE: TYPE: ACUTE PAIN

## 2025-08-23 ASSESSMENT — PAIN DESCRIPTION - ONSET: ONSET: ON-GOING

## 2025-08-23 ASSESSMENT — PAIN - FUNCTIONAL ASSESSMENT: PAIN_FUNCTIONAL_ASSESSMENT: ACTIVITIES ARE NOT PREVENTED

## 2025-08-23 ASSESSMENT — PAIN DESCRIPTION - FREQUENCY: FREQUENCY: INTERMITTENT

## 2025-08-24 LAB
ANION GAP SERPL CALCULATED.3IONS-SCNC: 11 MMOL/L (ref 9–17)
BASOPHILS # BLD: 0.03 K/UL
BASOPHILS NFR BLD: 0 % (ref 0–1)
BUN SERPL-MCNC: 17 MG/DL (ref 7–20)
CALCIUM SERPL-MCNC: 7.8 MG/DL (ref 8.3–10.6)
CHLORIDE SERPL-SCNC: 106 MMOL/L (ref 99–110)
CK SERPL-CCNC: 1417 U/L (ref 26–192)
CO2 SERPL-SCNC: 22 MMOL/L (ref 21–32)
CREAT SERPL-MCNC: 0.7 MG/DL (ref 0.6–1.2)
EOSINOPHIL # BLD: 0.01 K/UL
EOSINOPHILS RELATIVE PERCENT: 0 % (ref 0–3)
ERYTHROCYTE [DISTWIDTH] IN BLOOD BY AUTOMATED COUNT: 13.9 % (ref 11.7–14.9)
GFR, ESTIMATED: 75 ML/MIN/1.73M2
GLUCOSE BLD-MCNC: 118 MG/DL (ref 74–99)
GLUCOSE BLD-MCNC: 118 MG/DL (ref 74–99)
GLUCOSE BLD-MCNC: 121 MG/DL (ref 74–99)
GLUCOSE BLD-MCNC: 146 MG/DL (ref 74–99)
GLUCOSE SERPL-MCNC: 130 MG/DL (ref 74–99)
HCT VFR BLD AUTO: 34.9 % (ref 37–47)
HGB BLD-MCNC: 11.4 G/DL (ref 12.5–16)
IMM GRANULOCYTES # BLD AUTO: 0.03 K/UL
IMM GRANULOCYTES NFR BLD: 0 %
LYMPHOCYTES NFR BLD: 1.27 K/UL
LYMPHOCYTES RELATIVE PERCENT: 14 % (ref 24–44)
MCH RBC QN AUTO: 27.5 PG (ref 27–31)
MCHC RBC AUTO-ENTMCNC: 32.7 G/DL (ref 32–36)
MCV RBC AUTO: 84.1 FL (ref 78–100)
MONOCYTES NFR BLD: 0.86 K/UL
MONOCYTES NFR BLD: 10 % (ref 0–5)
NEUTROPHILS NFR BLD: 76 % (ref 36–66)
NEUTS SEG NFR BLD: 6.89 K/UL
PLATELET # BLD AUTO: 265 K/UL (ref 140–440)
PMV BLD AUTO: 11.3 FL (ref 7.5–11.1)
POTASSIUM SERPL-SCNC: 3.9 MMOL/L (ref 3.5–5.1)
RBC # BLD AUTO: 4.15 M/UL (ref 4.2–5.4)
SODIUM SERPL-SCNC: 140 MMOL/L (ref 136–145)
WBC OTHER # BLD: 9.1 K/UL (ref 4–10.5)

## 2025-08-24 PROCEDURE — 85025 COMPLETE CBC W/AUTO DIFF WBC: CPT

## 2025-08-24 PROCEDURE — 2580000003 HC RX 258: Performed by: NURSE PRACTITIONER

## 2025-08-24 PROCEDURE — 36415 COLL VENOUS BLD VENIPUNCTURE: CPT

## 2025-08-24 PROCEDURE — 94761 N-INVAS EAR/PLS OXIMETRY MLT: CPT

## 2025-08-24 PROCEDURE — 1200000000 HC SEMI PRIVATE

## 2025-08-24 PROCEDURE — 6370000000 HC RX 637 (ALT 250 FOR IP): Performed by: NURSE PRACTITIONER

## 2025-08-24 PROCEDURE — 2500000003 HC RX 250 WO HCPCS: Performed by: NURSE PRACTITIONER

## 2025-08-24 PROCEDURE — 80048 BASIC METABOLIC PNL TOTAL CA: CPT

## 2025-08-24 PROCEDURE — 6360000002 HC RX W HCPCS: Performed by: NURSE PRACTITIONER

## 2025-08-24 PROCEDURE — 82962 GLUCOSE BLOOD TEST: CPT

## 2025-08-24 PROCEDURE — 82550 ASSAY OF CK (CPK): CPT

## 2025-08-24 RX ORDER — SODIUM CHLORIDE, SODIUM LACTATE, POTASSIUM CHLORIDE, CALCIUM CHLORIDE 600; 310; 30; 20 MG/100ML; MG/100ML; MG/100ML; MG/100ML
INJECTION, SOLUTION INTRAVENOUS CONTINUOUS
Status: DISCONTINUED | OUTPATIENT
Start: 2025-08-24 | End: 2025-08-24

## 2025-08-24 RX ORDER — MIDODRINE HYDROCHLORIDE 5 MG/1
10 TABLET ORAL ONCE
Status: COMPLETED | OUTPATIENT
Start: 2025-08-24 | End: 2025-08-24

## 2025-08-24 RX ORDER — MIDODRINE HYDROCHLORIDE 5 MG/1
10 TABLET ORAL
Status: DISCONTINUED | OUTPATIENT
Start: 2025-08-24 | End: 2025-08-25

## 2025-08-24 RX ORDER — SODIUM CHLORIDE, SODIUM LACTATE, POTASSIUM CHLORIDE, CALCIUM CHLORIDE 600; 310; 30; 20 MG/100ML; MG/100ML; MG/100ML; MG/100ML
INJECTION, SOLUTION INTRAVENOUS CONTINUOUS
Status: DISPENSED | OUTPATIENT
Start: 2025-08-24 | End: 2025-08-25

## 2025-08-24 RX ORDER — CARVEDILOL 12.5 MG/1
12.5 TABLET ORAL 2 TIMES DAILY WITH MEALS
Status: DISCONTINUED | OUTPATIENT
Start: 2025-08-24 | End: 2025-08-26 | Stop reason: HOSPADM

## 2025-08-24 RX ORDER — 0.9 % SODIUM CHLORIDE 0.9 %
1000 INTRAVENOUS SOLUTION INTRAVENOUS ONCE
Status: COMPLETED | OUTPATIENT
Start: 2025-08-24 | End: 2025-08-24

## 2025-08-24 RX ADMIN — ENOXAPARIN SODIUM 30 MG: 100 INJECTION SUBCUTANEOUS at 16:37

## 2025-08-24 RX ADMIN — SODIUM CHLORIDE, POTASSIUM CHLORIDE, SODIUM LACTATE AND CALCIUM CHLORIDE: 600; 310; 30; 20 INJECTION, SOLUTION INTRAVENOUS at 16:56

## 2025-08-24 RX ADMIN — BRIMONIDINE TARTRATE 1 DROP: 2 SOLUTION/ DROPS OPHTHALMIC at 11:04

## 2025-08-24 RX ADMIN — MIDODRINE HYDROCHLORIDE 10 MG: 5 TABLET ORAL at 16:37

## 2025-08-24 RX ADMIN — BRIMONIDINE TARTRATE 1 DROP: 2 SOLUTION/ DROPS OPHTHALMIC at 20:28

## 2025-08-24 RX ADMIN — TIMOLOL MALEATE 1 DROP: 5 SOLUTION OPHTHALMIC at 11:04

## 2025-08-24 RX ADMIN — LATANOPROST 1 DROP: 50 SOLUTION OPHTHALMIC at 20:28

## 2025-08-24 RX ADMIN — WATER 1000 MG: 1 INJECTION INTRAMUSCULAR; INTRAVENOUS; SUBCUTANEOUS at 14:15

## 2025-08-24 RX ADMIN — SODIUM CHLORIDE 1000 ML: 0.9 INJECTION, SOLUTION INTRAVENOUS at 16:37

## 2025-08-24 RX ADMIN — SODIUM CHLORIDE, SODIUM LACTATE, POTASSIUM CHLORIDE, AND CALCIUM CHLORIDE: .6; .31; .03; .02 INJECTION, SOLUTION INTRAVENOUS at 06:02

## 2025-08-24 RX ADMIN — CARVEDILOL 12.5 MG: 12.5 TABLET, FILM COATED ORAL at 11:03

## 2025-08-24 RX ADMIN — SODIUM CHLORIDE, SODIUM LACTATE, POTASSIUM CHLORIDE, AND CALCIUM CHLORIDE: .6; .31; .03; .02 INJECTION, SOLUTION INTRAVENOUS at 16:36

## 2025-08-24 RX ADMIN — TIMOLOL MALEATE 1 DROP: 5 SOLUTION OPHTHALMIC at 20:28

## 2025-08-24 ASSESSMENT — PAIN SCALES - GENERAL
PAINLEVEL_OUTOF10: 0
PAINLEVEL_OUTOF10: 0

## 2025-08-25 PROBLEM — E44.0 MODERATE PROTEIN-CALORIE MALNUTRITION: Status: ACTIVE | Noted: 2025-08-25

## 2025-08-25 LAB
ALBUMIN SERPL-MCNC: 2.9 G/DL (ref 3.4–5)
ALBUMIN/GLOB SERPL: 1.4 {RATIO}
ALP SERPL-CCNC: 46 U/L (ref 40–129)
ALT SERPL-CCNC: 16 U/L (ref 10–40)
ANION GAP SERPL CALCULATED.3IONS-SCNC: 8 MMOL/L (ref 9–17)
AST SERPL-CCNC: 31 U/L (ref 15–37)
BASOPHILS # BLD: 0.06 K/UL
BASOPHILS NFR BLD: 1 % (ref 0–1)
BILIRUB SERPL-MCNC: 0.4 MG/DL (ref 0–1)
BUN SERPL-MCNC: 15 MG/DL (ref 7–20)
CA-I BLD-SCNC: 1.14 MMOL/L (ref 1.15–1.33)
CALCIUM SERPL-MCNC: 7.8 MG/DL (ref 8.3–10.6)
CHLORIDE SERPL-SCNC: 109 MMOL/L (ref 99–110)
CK SERPL-CCNC: 432 U/L (ref 26–192)
CO2 SERPL-SCNC: 21 MMOL/L (ref 21–32)
CREAT SERPL-MCNC: 0.6 MG/DL (ref 0.6–1.2)
EOSINOPHIL # BLD: 0.27 K/UL
EOSINOPHILS RELATIVE PERCENT: 4 % (ref 0–3)
ERYTHROCYTE [DISTWIDTH] IN BLOOD BY AUTOMATED COUNT: 14.2 % (ref 11.7–14.9)
GFR, ESTIMATED: 86 ML/MIN/1.73M2
GLUCOSE BLD-MCNC: 100 MG/DL (ref 74–99)
GLUCOSE BLD-MCNC: 83 MG/DL (ref 74–99)
GLUCOSE BLD-MCNC: 93 MG/DL (ref 74–99)
GLUCOSE BLD-MCNC: 95 MG/DL (ref 74–99)
GLUCOSE SERPL-MCNC: 99 MG/DL (ref 74–99)
HCT VFR BLD AUTO: 35.3 % (ref 37–47)
HGB BLD-MCNC: 10.8 G/DL (ref 12.5–16)
IMM GRANULOCYTES # BLD AUTO: 0.01 K/UL
IMM GRANULOCYTES NFR BLD: 0 %
LYMPHOCYTES NFR BLD: 1.09 K/UL
LYMPHOCYTES RELATIVE PERCENT: 15 % (ref 24–44)
MCH RBC QN AUTO: 27.1 PG (ref 27–31)
MCHC RBC AUTO-ENTMCNC: 30.6 G/DL (ref 32–36)
MCV RBC AUTO: 88.7 FL (ref 78–100)
MICROORGANISM SPEC CULT: NORMAL
MONOCYTES NFR BLD: 0.92 K/UL
MONOCYTES NFR BLD: 13 % (ref 0–5)
NEUTROPHILS NFR BLD: 67 % (ref 36–66)
NEUTS SEG NFR BLD: 4.83 K/UL
PLATELET # BLD AUTO: 206 K/UL (ref 140–440)
PMV BLD AUTO: 10.7 FL (ref 7.5–11.1)
POTASSIUM SERPL-SCNC: 3.6 MMOL/L (ref 3.5–5.1)
PROT SERPL-MCNC: 4.9 G/DL (ref 6.4–8.2)
RBC # BLD AUTO: 3.98 M/UL (ref 4.2–5.4)
SERVICE CMNT-IMP: NORMAL
SODIUM SERPL-SCNC: 138 MMOL/L (ref 136–145)
SPECIMEN DESCRIPTION: NORMAL
WBC OTHER # BLD: 7.2 K/UL (ref 4–10.5)

## 2025-08-25 PROCEDURE — 92610 EVALUATE SWALLOWING FUNCTION: CPT

## 2025-08-25 PROCEDURE — 82962 GLUCOSE BLOOD TEST: CPT

## 2025-08-25 PROCEDURE — 1200000000 HC SEMI PRIVATE

## 2025-08-25 PROCEDURE — 97162 PT EVAL MOD COMPLEX 30 MIN: CPT

## 2025-08-25 PROCEDURE — 82550 ASSAY OF CK (CPK): CPT

## 2025-08-25 PROCEDURE — 2580000003 HC RX 258: Performed by: NURSE PRACTITIONER

## 2025-08-25 PROCEDURE — 97530 THERAPEUTIC ACTIVITIES: CPT

## 2025-08-25 PROCEDURE — 82330 ASSAY OF CALCIUM: CPT

## 2025-08-25 PROCEDURE — 80053 COMPREHEN METABOLIC PANEL: CPT

## 2025-08-25 PROCEDURE — 6370000000 HC RX 637 (ALT 250 FOR IP): Performed by: NURSE PRACTITIONER

## 2025-08-25 PROCEDURE — 97166 OT EVAL MOD COMPLEX 45 MIN: CPT

## 2025-08-25 PROCEDURE — 94761 N-INVAS EAR/PLS OXIMETRY MLT: CPT

## 2025-08-25 PROCEDURE — 36415 COLL VENOUS BLD VENIPUNCTURE: CPT

## 2025-08-25 PROCEDURE — 2500000003 HC RX 250 WO HCPCS: Performed by: NURSE PRACTITIONER

## 2025-08-25 PROCEDURE — 6360000002 HC RX W HCPCS: Performed by: NURSE PRACTITIONER

## 2025-08-25 PROCEDURE — 85025 COMPLETE CBC W/AUTO DIFF WBC: CPT

## 2025-08-25 RX ORDER — MIDODRINE HYDROCHLORIDE 5 MG/1
5 TABLET ORAL
Status: DISCONTINUED | OUTPATIENT
Start: 2025-08-25 | End: 2025-08-26

## 2025-08-25 RX ADMIN — MIDODRINE HYDROCHLORIDE 5 MG: 5 TABLET ORAL at 08:19

## 2025-08-25 RX ADMIN — BRIMONIDINE TARTRATE 1 DROP: 2 SOLUTION/ DROPS OPHTHALMIC at 08:21

## 2025-08-25 RX ADMIN — TIMOLOL MALEATE 1 DROP: 5 SOLUTION OPHTHALMIC at 08:23

## 2025-08-25 RX ADMIN — TIMOLOL MALEATE 1 DROP: 5 SOLUTION OPHTHALMIC at 19:38

## 2025-08-25 RX ADMIN — BRIMONIDINE TARTRATE 1 DROP: 2 SOLUTION/ DROPS OPHTHALMIC at 19:38

## 2025-08-25 RX ADMIN — SODIUM CHLORIDE, POTASSIUM CHLORIDE, SODIUM LACTATE AND CALCIUM CHLORIDE: 600; 310; 30; 20 INJECTION, SOLUTION INTRAVENOUS at 03:00

## 2025-08-25 RX ADMIN — SODIUM CHLORIDE, PRESERVATIVE FREE 10 ML: 5 INJECTION INTRAVENOUS at 19:39

## 2025-08-25 RX ADMIN — LATANOPROST 1 DROP: 50 SOLUTION OPHTHALMIC at 19:38

## 2025-08-25 RX ADMIN — ENOXAPARIN SODIUM 30 MG: 100 INJECTION SUBCUTANEOUS at 16:56

## 2025-08-25 RX ADMIN — Medication 1 TABLET: at 16:55

## 2025-08-25 RX ADMIN — POLYETHYLENE GLYCOL 3350 17 G: 17 POWDER, FOR SOLUTION ORAL at 08:19

## 2025-08-25 ASSESSMENT — PAIN SCALES - GENERAL
PAINLEVEL_OUTOF10: 0
PAINLEVEL_OUTOF10: 0

## 2025-08-26 VITALS
SYSTOLIC BLOOD PRESSURE: 118 MMHG | WEIGHT: 110 LBS | RESPIRATION RATE: 16 BRPM | HEIGHT: 62 IN | BODY MASS INDEX: 20.24 KG/M2 | OXYGEN SATURATION: 96 % | TEMPERATURE: 97.7 F | HEART RATE: 68 BPM | DIASTOLIC BLOOD PRESSURE: 55 MMHG

## 2025-08-26 LAB
ALBUMIN SERPL-MCNC: 2.9 G/DL (ref 3.4–5)
ALBUMIN/GLOB SERPL: 1.4 {RATIO}
ALP SERPL-CCNC: 51 U/L (ref 40–129)
ALT SERPL-CCNC: 15 U/L (ref 10–40)
ANION GAP SERPL CALCULATED.3IONS-SCNC: 9 MMOL/L (ref 9–17)
AST SERPL-CCNC: 25 U/L (ref 15–37)
BASOPHILS # BLD: 0.05 K/UL
BASOPHILS NFR BLD: 1 % (ref 0–1)
BILIRUB SERPL-MCNC: 0.6 MG/DL (ref 0–1)
BUN SERPL-MCNC: 13 MG/DL (ref 7–20)
CALCIUM SERPL-MCNC: 8 MG/DL (ref 8.3–10.6)
CHLORIDE SERPL-SCNC: 107 MMOL/L (ref 99–110)
CK SERPL-CCNC: 184 U/L (ref 26–192)
CO2 SERPL-SCNC: 23 MMOL/L (ref 21–32)
CREAT SERPL-MCNC: 0.5 MG/DL (ref 0.6–1.2)
EOSINOPHIL # BLD: 0.3 K/UL
EOSINOPHILS RELATIVE PERCENT: 4 % (ref 0–3)
ERYTHROCYTE [DISTWIDTH] IN BLOOD BY AUTOMATED COUNT: 13.8 % (ref 11.7–14.9)
GFR, ESTIMATED: 86 ML/MIN/1.73M2
GLUCOSE BLD-MCNC: 112 MG/DL (ref 74–99)
GLUCOSE BLD-MCNC: 85 MG/DL (ref 74–99)
GLUCOSE BLD-MCNC: 98 MG/DL (ref 74–99)
GLUCOSE BLD-MCNC: 99 MG/DL (ref 74–99)
GLUCOSE SERPL-MCNC: 88 MG/DL (ref 74–99)
HCT VFR BLD AUTO: 34.6 % (ref 37–47)
HGB BLD-MCNC: 10.9 G/DL (ref 12.5–16)
IMM GRANULOCYTES # BLD AUTO: 0.03 K/UL
IMM GRANULOCYTES NFR BLD: 0 %
LYMPHOCYTES NFR BLD: 1.15 K/UL
LYMPHOCYTES RELATIVE PERCENT: 16 % (ref 24–44)
MCH RBC QN AUTO: 27.1 PG (ref 27–31)
MCHC RBC AUTO-ENTMCNC: 31.5 G/DL (ref 32–36)
MCV RBC AUTO: 86.1 FL (ref 78–100)
MONOCYTES NFR BLD: 0.77 K/UL
MONOCYTES NFR BLD: 11 % (ref 0–5)
NEUTROPHILS NFR BLD: 68 % (ref 36–66)
NEUTS SEG NFR BLD: 4.78 K/UL
PLATELET # BLD AUTO: 234 K/UL (ref 140–440)
PMV BLD AUTO: 11 FL (ref 7.5–11.1)
POTASSIUM SERPL-SCNC: 3.6 MMOL/L (ref 3.5–5.1)
PROT SERPL-MCNC: 5 G/DL (ref 6.4–8.2)
RBC # BLD AUTO: 4.02 M/UL (ref 4.2–5.4)
SODIUM SERPL-SCNC: 138 MMOL/L (ref 136–145)
WBC OTHER # BLD: 7.1 K/UL (ref 4–10.5)

## 2025-08-26 PROCEDURE — 97530 THERAPEUTIC ACTIVITIES: CPT

## 2025-08-26 PROCEDURE — 2500000003 HC RX 250 WO HCPCS: Performed by: NURSE PRACTITIONER

## 2025-08-26 PROCEDURE — 6360000002 HC RX W HCPCS: Performed by: NURSE PRACTITIONER

## 2025-08-26 PROCEDURE — 6370000000 HC RX 637 (ALT 250 FOR IP): Performed by: NURSE PRACTITIONER

## 2025-08-26 PROCEDURE — 82550 ASSAY OF CK (CPK): CPT

## 2025-08-26 PROCEDURE — 80053 COMPREHEN METABOLIC PANEL: CPT

## 2025-08-26 PROCEDURE — 82962 GLUCOSE BLOOD TEST: CPT

## 2025-08-26 PROCEDURE — 97535 SELF CARE MNGMENT TRAINING: CPT

## 2025-08-26 PROCEDURE — 36415 COLL VENOUS BLD VENIPUNCTURE: CPT

## 2025-08-26 PROCEDURE — 97116 GAIT TRAINING THERAPY: CPT

## 2025-08-26 PROCEDURE — 85025 COMPLETE CBC W/AUTO DIFF WBC: CPT

## 2025-08-26 PROCEDURE — 94761 N-INVAS EAR/PLS OXIMETRY MLT: CPT

## 2025-08-26 RX ORDER — FLUTICASONE PROPIONATE 50 MCG
1 SPRAY, SUSPENSION (ML) NASAL DAILY PRN
Status: CANCELLED | OUTPATIENT
Start: 2025-08-26

## 2025-08-26 RX ORDER — ACETAMINOPHEN 325 MG/1
650 TABLET ORAL EVERY 6 HOURS PRN
Status: CANCELLED | OUTPATIENT
Start: 2025-08-26

## 2025-08-26 RX ORDER — ONDANSETRON 4 MG/1
4 TABLET, ORALLY DISINTEGRATING ORAL EVERY 8 HOURS PRN
Status: CANCELLED | OUTPATIENT
Start: 2025-08-26

## 2025-08-26 RX ORDER — HYDROCODONE BITARTRATE AND ACETAMINOPHEN 5; 325 MG/1; MG/1
1 TABLET ORAL EVERY 6 HOURS PRN
Status: CANCELLED | OUTPATIENT
Start: 2025-08-26

## 2025-08-26 RX ORDER — BRIMONIDINE TARTRATE 2 MG/ML
1 SOLUTION/ DROPS OPHTHALMIC 2 TIMES DAILY
Status: CANCELLED | OUTPATIENT
Start: 2025-08-26

## 2025-08-26 RX ORDER — ACETAMINOPHEN 650 MG/1
650 SUPPOSITORY RECTAL EVERY 6 HOURS PRN
Status: CANCELLED | OUTPATIENT
Start: 2025-08-26

## 2025-08-26 RX ORDER — MIDODRINE HYDROCHLORIDE 5 MG/1
10 TABLET ORAL
Status: DISCONTINUED | OUTPATIENT
Start: 2025-08-26 | End: 2025-08-26 | Stop reason: HOSPADM

## 2025-08-26 RX ORDER — POLYETHYLENE GLYCOL 3350 17 G/17G
17 POWDER, FOR SOLUTION ORAL DAILY
Status: CANCELLED | OUTPATIENT
Start: 2025-08-27

## 2025-08-26 RX ORDER — INSULIN LISPRO 100 [IU]/ML
0-4 INJECTION, SOLUTION INTRAVENOUS; SUBCUTANEOUS
Status: CANCELLED | OUTPATIENT
Start: 2025-08-26

## 2025-08-26 RX ORDER — ENOXAPARIN SODIUM 100 MG/ML
30 INJECTION SUBCUTANEOUS DAILY
Status: CANCELLED | OUTPATIENT
Start: 2025-08-26

## 2025-08-26 RX ORDER — CARVEDILOL 12.5 MG/1
12.5 TABLET ORAL 2 TIMES DAILY WITH MEALS
Status: CANCELLED | OUTPATIENT
Start: 2025-08-26

## 2025-08-26 RX ORDER — LATANOPROST 50 UG/ML
1 SOLUTION/ DROPS OPHTHALMIC NIGHTLY
Status: CANCELLED | OUTPATIENT
Start: 2025-08-26

## 2025-08-26 RX ORDER — TIMOLOL MALEATE 5 MG/ML
1 SOLUTION/ DROPS OPHTHALMIC 2 TIMES DAILY
Status: CANCELLED | OUTPATIENT
Start: 2025-08-26

## 2025-08-26 RX ORDER — SENNA AND DOCUSATE SODIUM 50; 8.6 MG/1; MG/1
2 TABLET, FILM COATED ORAL DAILY PRN
Status: CANCELLED | OUTPATIENT
Start: 2025-08-26

## 2025-08-26 RX ORDER — ONDANSETRON 2 MG/ML
4 INJECTION INTRAMUSCULAR; INTRAVENOUS EVERY 6 HOURS PRN
Status: CANCELLED | OUTPATIENT
Start: 2025-08-26

## 2025-08-26 RX ORDER — MIDODRINE HYDROCHLORIDE 5 MG/1
5 TABLET ORAL
Status: CANCELLED | OUTPATIENT
Start: 2025-08-26

## 2025-08-26 RX ORDER — GLUCAGON 1 MG/ML
1 KIT INJECTION PRN
Status: CANCELLED | OUTPATIENT
Start: 2025-08-26

## 2025-08-26 RX ADMIN — POLYETHYLENE GLYCOL 3350 17 G: 17 POWDER, FOR SOLUTION ORAL at 08:39

## 2025-08-26 RX ADMIN — MIDODRINE HYDROCHLORIDE 10 MG: 5 TABLET ORAL at 17:51

## 2025-08-26 RX ADMIN — MIDODRINE HYDROCHLORIDE 5 MG: 5 TABLET ORAL at 08:38

## 2025-08-26 RX ADMIN — Medication 1 TABLET: at 17:47

## 2025-08-26 RX ADMIN — MIDODRINE HYDROCHLORIDE 5 MG: 5 TABLET ORAL at 14:16

## 2025-08-26 RX ADMIN — Medication 1 TABLET: at 08:37

## 2025-08-26 RX ADMIN — ENOXAPARIN SODIUM 30 MG: 100 INJECTION SUBCUTANEOUS at 17:46

## 2025-08-26 RX ADMIN — SODIUM CHLORIDE, PRESERVATIVE FREE 10 ML: 5 INJECTION INTRAVENOUS at 08:38

## 2025-08-26 RX ADMIN — TIMOLOL MALEATE 1 DROP: 5 SOLUTION OPHTHALMIC at 08:39

## 2025-08-26 RX ADMIN — BRIMONIDINE TARTRATE 1 DROP: 2 SOLUTION/ DROPS OPHTHALMIC at 08:37

## 2025-08-26 ASSESSMENT — PAIN SCALES - GENERAL
PAINLEVEL_OUTOF10: 0
PAINLEVEL_OUTOF10: 0

## 2025-08-27 PROBLEM — R53.81 PHYSICAL DEBILITY: Status: ACTIVE | Noted: 2025-08-27
